# Patient Record
(demographics unavailable — no encounter records)

---

## 2017-02-07 NOTE — ACF
Admission Forms Criteria


                               MYOCARDIAL INFARCTION





Clinical Indications for Admission to Inpatient Care





                                                            (Place 'X' for any 

and all applicable criteria):





Admission is indicated for ANY ONE of the following (1)(2)(3)(4):





[X]I.    Acute MI





[ ]II.  Contraindications and/or Inappropriate clinical situations for 

Observational Care in patients


        with Myocardial Infarction, when ANY ONE of the following is required:


        [ ]a)  Patient with High risk of cardiac embolism (e.g, patients with 

previous cardiac embolism, LVEF < 40%, 


                age >75 and patients with prosthetic valve) 18


        [ ]b)  Patient with Moderate risk including DM patient, CAD and patient 

aged 65-75 18


        [ ]c)  Patient with any change in cardiac biomarker especially troponin 

should be managed as high risk in an inpatient setting 19


        [ ]d)  Physician judgement irrespective of ECG and other diagnostic 

findings 20


[ ]III.General contraindications and/or Inappropriate clinical situations for 

Observational Care in patients


        with Myocardial Infarction, when ANY ONE of the following is required:


        [ ]a)   Prediction of prolongation of LOS based on ANY ONE of the 

following may be considered as 


                 a contraindication for observational care 2, 3, 4, 5, 6, 7, 8, 

9, 10, 11


                 [ ]i)    Age > 65 yrs.


                 [ ]ii)   Patient arriving by ambulance


                 [ ]iii)  Patient with high acuity


                 [ ]iv)  Patient requiring vital sign monitoring


                 [ ]v)   Patient on IV medication


       [ ]b)   Systolic blood pressures  180mmHg 3,12


       [ ]c)   Patient with altered mental status including delirium and other 

alteration of consciousness, (3)


       [ ]d)   Patient whose discharge disposition will be to a skilled nursing 

home or rehabilitation home should 


                not be managed in Emergency Department Observation Unit. CMS 

rule requires 3 days hospital stay before such placement. 3,13


       [ ]e)   Patient with failure to thrive due to broad array of etiologies 3

,16,17


       [ ]f)    Inability to ambulate 3,14








 Extended stay beyond goal length of stay may be needed for (1)(18)(20)(24)(25):


 [ ]a)   Hemodynamic instability, persisting symptoms after intensive medical 

management, or 


          recurring severe, prolonged symptoms


 [ ]b)   Intravascular procedural complications such as acute vessel closure, 

stent thrombosis, 


          stent malposition, or vessel dissection (26)(27)(28)


 [ ]c)   Extravascular procedural complications such as retroperitoneal hematoma

, pericardial 


          effusion, or cardiac tamponade


 [ ]d)   Entry site complications causing bleeding, hematoma or distal ischemia 

and requiring 


          ongoing monitoring, surgical repair or surgical thrombectomy(29)


 [ ]e)   Dangerous arrhythmia


 [ ]f)    Complicated percutaneous coronary intervention (e.g., unsuccessful 

percutaneous 


          coronary intervention or percutaneous coronary intervention of non-

native vessel)


 [ ]g)   Urgent or emergent surgery for complications of MI (e.g., ventricular 

rupture, 


          valvular insufficiency)


 [ ]h)   Surgical revascularization via coronary artery bypass graft 


 [ ]i)    Heart failure (e.g., pulmonary edema)


 [ ]j)    Unstable pulmonary comorbidities, including COPD or pneumonia (31)


 [ ]k)   Acute renal failure








The original [employer] content created by [employer] has been revised.


The portions of the content which have been revised are identified through the 

use of italic text or in bold, and [employer] 


has neither reviewed nor approved the modified material. All other unmodified 

content is copyright  [facility] [employer]





Please see references footnoted in the original [facility] edition 

2016











THEE DOBBS Feb 7, 2017 16:14

## 2017-02-07 NOTE — EKG
[facility]

               [address] [place] [address]

Test Date:    2017               Test Time:    11:41:20

Pat Name:     GANGA MONTERO         Department:   

Patient ID:   [id number]           Room:          

Gender:       F                        Technician:   

:          [id number]               Requested By: DAMIEN ROGERS

Order Number: [id number]            Reading MD:   Sarah Benoit

                                 Measurements

Intervals                              Axis          

Rate:         105                      P:            31

TX:           150                      QRS:          0

QRSD:         88                       T:            158

QT:           332                                    

QTc:          443                                    

                           Interpretive Statements

SINUS TACHYCARDIA

LEFTWARD AXIS

LVH WITH REPOLARIZATION ABNORMALITY

ST T WAVE CHANGES CONSISTENT WITH LATERAL WALL ISCHEMIA





Electronically Signed On 2017 14:51:48 CST by Sarah Benoit

## 2017-02-07 NOTE — PDOC2
WENCESLAO FRASER APRN 2/7/17 1303:


CARDIAC CONSULT


DATE OF CONSULT


Date of Consult


DATE: 2/7/17 


TIME: 12:53





REASON FOR CONSULT


Reason for Consult:


NSTEMI





REFERRING PHYSICIAN


Referring Physician:


Dr. Majano





SOURCE


Source:  Caregiver, Chart review, Patient





HISTORY OF PRESENT ILLNESS


HISTORY OF PRESENT ILLNESS


This is a 58 yo female, with a h/o CAD s/p PCI/stenting of RCA, HTN, HLP, and 

CKD, who presented with complaints of intermittent chest pain over the last two 

weeks. Worse last night. Located in left chest. Stabbing in nature. Associated 

with back pain and SOA. One episode of nausea/vomiting last week. Activity, 

along with eating and drinking seems to worsen pain. Relieved by rest. Denies 

LE edema or orthopnea. Positives for DWYER. Denies any recent illness/fevers. 

Reports activity has been very limited over the last couple of weeks due to 

pain and SOA. Patient reports financial constraints; unable to afford 

medications/follow-up. Apparently ran out of medication on month after recent 

hospitalization in October and has not taken any medications since. Presently 

CP free. C/o back pain and mild SOA.





PAST MEDICAL HISTORY


Past Medical History


Cardiovascular:  HTN, CAD s/p PCI/stenting to RCA, Hyperlipidemia


Pulmonary:  No pertinent hx, Other (pleural effusion with thoracentesis)


CENTRAL NERVOUS SYSTEM:  Other (No pertinent history)


GI:  GERD


Heme/Onc:  No pertinent hx


Hepatobiliary:  No pertinent hx


Psych:  No pertinent hx


Musculoskeletal:  Osteoarthritis


Rheumatologic:  No pertinent hx


Infectious disease:  No pertinent hx


ENT:  No pertinent hx


Renal/:  CKD


Endocrine:  Diabetes (2)


Dermatology:  Psoriasis





PAST SURGICAL HISTORY


Past Surgical History


ureteral stent placement, PCI/stent to RCA 2/2016





FAMILY HISTORY


Family History:  Heart Disease





SOCIAL HISTORY


Smoke:  No


ALCOHOL:  none


Lives:  with Family





ALLERGIES


ALLERGIES:  


Coded Allergies:  


     No Known Drug Allergies (Unverified , 2/28/16)





ROS


Review of System


14 point ROS conducted with pertinent positives noted above in HPI.





PHYSICAL EXAM


General:  Alert, Oriented X3, Cooperative, moderate distress (now on NRB)


HEENT:  Atraumatic, Mucous membr. moist/pink


Lungs:  Other (diminished bases )


Heart:  Normal S1, Normal S2, Other (tele: ST, 2/6 systolci murmur )


Abdomen:  Soft, No tenderness


Extremities:  No cyanosis, No edema, Normal pulses


Skin:  No breakdown, No significant lesion, Other (psoriasis )


Neuro:  Normal speech, Sensation intact


Psych/Mental Status:  Mental status NL, Mood NL


MUSCULOSKELETAL:  Full range of motion without pain





VITALS


VITALS





 Vital Signs








  Date Time  Temp Pulse Resp B/P Pulse Ox O2 Delivery O2 Flow Rate FiO2


 


2/7/17 11:39 97.8 109 20 166/79 100 Room Air  





 97.8       











LABS


Lab:





Laboratory Tests








Test


  2/7/17


12:02


 


White Blood Count


  8.3x10^3/uL


(4.0-11.0)


 


Red Blood Count


  2.69x10^6/uL


(3.50-5.40)


 


Hemoglobin


  8.1g/dL


(12.0-15.5)


 


Hematocrit


  25.1%


(36.0-47.0)


 


Mean Corpuscular Volume 94fL () 


 


Mean Corpuscular Hemoglobin 30pg (25-35) 


 


Mean Corpuscular Hemoglobin


Concent 32g/dL (31-37) 


 


 


Red Cell Distribution Width


  14.9%


(11.5-14.5)


 


Platelet Count


  211x10^3/uL


(140-400)


 


Neutrophils (%) (Auto) 73% (31-73) 


 


Lymphocytes (%) (Auto) 17% (24-48) 


 


Monocytes (%) (Auto) 7% (0-9) 


 


Eosinophils (%) (Auto) 3% (0-3) 


 


Basophils (%) (Auto) 1% (0-3) 


 


Neutrophils # (Auto)


  6.0x10^3uL


(1.8-7.7)


 


Lymphocytes # (Auto)


  1.4x10^3/uL


(1.0-4.8)


 


Monocytes # (Auto)


  0.6x10^3/uL


(0.0-1.1)


 


Eosinophils # (Auto)


  0.2x10^3/uL


(0.0-0.7)


 


Basophils # (Auto)


  0.1x10^3/uL


(0.0-0.2)


 


Sodium Level


  141mmol/L


(136-145)


 


Potassium Level


  4.9mmol/L


(3.5-5.1)


 


Chloride Level


  111mmol/L


()


 


Carbon Dioxide Level


  19mmol/L


(21-32)


 


Anion Gap 11 (6-14) 


 


Blood Urea Nitrogen 49mg/dL (7-20) 


 


Creatinine


  3.9mg/dL


(0.6-1.0)


 


Estimated GFR


(Cockcroft-Gault) 11.8 


 


 


BUN/Creatinine Ratio 13 (6-20) 


 


Glucose Level


  185mg/dL


(70-99)


 


Calcium Level


  8.0mg/dL


(8.5-10.1)


 


Total Bilirubin


  0.2mg/dL


(0.2-1.0)


 


Aspartate Amino Transf


(AST/SGOT) 17U/L (15-37) 


 


 


Alanine Aminotransferase


(ALT/SGPT) 10U/L (14-59) 


 


 


Alkaline Phosphatase 90U/L () 


 


Troponin I Quantitative


  2.073ng/mL


(0.000-0.055)


 


NT-Pro-B-Type Natriuretic


Peptide 31857bn/mL


(0-124)


 


Total Protein


  6.2g/dL


(6.4-8.2)


 


Albumin


  2.6g/dL


(3.4-5.0)


 


Albumin/Globulin Ratio 0.7 (1.0-1.7) 











ECHOCARDIOGRAM


ECHOCARDIOGRAM


<Conclusion>


The left ventricle is normal size.


The left ventricular systolic function is low normal.


The Ejection Fraction is estimated at 50%.


There is mild hypokinesis in the mid to distal septal wall.


There is mild concentric left ventricular hypertrophy.


There is no significant aortic valvular stenosis.


Doppler and Color Flow revealed no significant aortic regurgitation.


Doppler and Color-flow revealed mild to moderate mitral regurgitation.


Doppler and Color Flow revealed trace tricuspid regurgitation.





DATE:     10/30/16 1411





STRESS TEST


STRESS TEST


1. Abnormal baseline EKG but no EKG evidence of stressed induced ischemia.


2. Nuclear imaging shows partially reversible ischemia in the inferior lateral 

region.


3. Decreased ejection fraction at 36% with inferior apical hypokinesis.


4. Moderate risk nuclear stress test.





DATE:     10/30/16 1354





HEART CATH


HEART CATH


Conclusion


1.  80% stenosis involving the proximal to midsegment of the right coronary 

artery.  Also seen was diffuse disease involving the distal segments of the 

left anterior descending and left circumflex artery that are not amenable for 

percutaneous intervention.


2.  Successful PCI/stents placement to the right coronary artery.





Recommendations


1.  Aspirin 325 mg daily


2.  Plavix 75 mg daily for preferably one year


3.  Cardiovascular risk factor modification








DATE:     03/04/16 5186





ASSESSMENT/PLAN


ASSESSMENT/PLAN


1.  NSTEMI 


2.  CAD   


3.  Acute on chronic combined systolic and diastolic HF


4.  Ischemic cardiomyopathy


5.  Malignant HTN


6.  HLP


7.  WALTER with CKD


8.  Noncompliance secondary to financial constraints. 














Recommendations





stat echo


initial trop 2.073- continue with series


on heparin gtt. ASA in ED. Lipids in am


start nitro gtt for vasodilation/BP control


will give IV lasix now


cardiac cath reviewed; suspect narrow LAD to be culprit 


If no significant improvement of symptoms with diuresis and BP control, would 

recommend cardiac cath. Discussed with primary cardiologist. 


Benefits, risks (including possibility of long-term HD), and alternatives 

discussed with patient and daughter and are agreeable. Will monitor closely


Problems:  





MANDO OLSON MD 2/7/17 1508:


CARDIAC CONSULT


ALLERGIES


ALLERGIES:  


Coded Allergies:  


     No Known Drug Allergies (Unverified , 2/28/16)





ASSESSMENT/PLAN


ASSESSMENT/PLAN


Patient seen and examined.  Agree with above nurse practitioner note.


59-year-old woman presenting with acute on chronic systolic heart failure.


On examination she is tachypneic with coarse rales throughout her lung fields.


Laboratory studies reviewed and notable for elevated troponin.


Prior angiogram reviewed and suspect that she has had ischemia in the setting 

of significant hypertension and severe mitral regurgitation and diffuse 

coronary disease.


We will initiate medical therapy with anticoagulation including heparinization 

but hold off on antiplatelet therapy in the event she has surgical disease.  

Continue offloading the left ventricle with nitroglycerin drip and decreasing 

blood pressure.  Initiate diuresis with Lasix.  Discussed with Dr. Gillette 

Continue supportive care.  If she does not have any significant improvement by 

tomorrow then we will consider cardiac catheterization.


Problems:  








WENCESLAO FRASER Feb 7, 2017 13:03


MANDO OLSON MD Feb 7, 2017 16:58

## 2017-02-07 NOTE — RAD
Portable chest, 2/7/2017:



History: Chest pain



Comparison is made to a study from 10/29/2016. The patient is rotated to the

right. The heart is at the upper limits of normal in size. The pulmonary

vascularity is more prominent than on the previous study with mild upper lobe

pulmonary venous redistribution. No pulmonary consolidation is seen. There is

no evidence of pleural fluid.



IMPRESSION: Borderline vascular congestion.

## 2017-02-07 NOTE — PHYS DOC
Past Medical History


Past Medical History:  CAD, CHF, Diabetes-Type II, Hypertension, Other


Additional Past Medical Histor:  PSORASIS


Past Surgical History:  Angioplasty, Coronary Bypass Surgery, Other


Additional Past Surgical Histo:  2L fluid removed from lungs, coronary 

angiography


Alcohol Use:  None


Drug Use:  None





Adult General


Chief Complaint


Chief Complaint:  CHEST WALL PAIN





HPI


HPI


59-year-old female with multiple medical problems including history of heart 

disease and renal failure presents with a 18-24 hour history of chest 

discomfort. She states it was problematic last night and again this morning but 

on arrival to the emergency department she is pain-free. She also complains of 

some shortness of breath and nausea but no diaphoresis. She has not been active 

and is unsure whether or not symptoms worsened with exertion. She states this 

pain feels exactly like the pain she had previously with a heart attack. 

Patient is [place] only speaking in the history was taken through an 

. She does report that she was supposed to follow with a 

cardiologist and a nephrologist but due to insurance concerns she followed with 

neither of them. []





Review of Systems


Review of Systems





Constitutional: Denies fever or chills []


Eyes: Denies change in visual acuity, redness, or eye pain []


HENT: Denies nasal congestion or sore throat []


Respiratory: Denies cough or shortness of breath []


Cardiovascular: No additional information not addressed in HPI []


GI: Denies abdominal pain, nausea, vomiting, bloody stools or diarrhea []


: Denies dysuria or hematuria []


Musculoskeletal: Denies back pain or joint pain []


Integument: Denies rash or skin lesions []


Neurologic: Denies headache, focal weakness or sensory changes []


Endocrine: Denies polyuria or polydipsia []





Current Medications


Current Medications





 Current Medications








 Medications


  (Trade)  Dose


 Ordered  Sig/Gume  Start Time


 Stop Time Status Last Admin


Dose Admin


 


 Aspirin 324 mg  324 mg  1X  ONCE  2/7/17 12:45


 2/7/17 12:48 DC 2/7/17 12:59


324 MG


 


 Heparin Sodium


  (Porcine)  1,950 unit  PRN Q6HRS  PRN  2/7/17 12:45


    2/7/17 13:01


1,950 UNIT


 


 Heparin Sodium/


 Dextrose  500 ml @ 


 19 mls/hr  CONT  PRN  2/7/17 12:45


    2/8/17 14:45


19 MLS/HR


 


 Morphine Sulfate  4 mg  PRN Q2HR  PRN  2/7/17 12:45


 2/8/17 12:44 DC 2/7/17 14:03


4 MG


 


 Nitroglycerin


  (Nitrostat)  0.4 mg  PRN Q5MIN  PRN  2/7/17 12:45


 2/8/17 12:44 DC 2/7/17 14:03


0.4 MG


 


 Ondansetron HCl


  (Zofran)  4 mg  PRN Q8HRS  PRN  2/7/17 12:45


 2/8/17 12:44 DC  


 











Allergies


Allergies





 Allergies








Coded Allergies Type Severity Reaction Last Updated Verified


 


  No Known Drug Allergies    2/28/16 No











Physical Exam


Physical Exam





Constitutional: Well developed, well nourished, no acute distress, non-toxic 

appearance. []


HENT: Normocephalic, atraumatic, bilateral external ears normal, oropharynx 

moist, no oral exudates, nose normal. []


Eyes: PERRLA, EOMI, conjunctiva normal, no discharge. [] 


Neck: Normal range of motion, no tenderness, supple, no stridor. [] 


Cardiovascular:Heart rate regular rhythm, no murmur []


Lungs & Thorax:  Bilateral breath sounds clear to auscultation []


Abdomen: Bowel sounds normal, soft, no tenderness, no masses, no pulsatile 

masses. [] 


Skin: Warm, dry, no erythema, no rash. [] 


Back: No tenderness, no CVA tenderness. [] 


Extremities: No tenderness, no cyanosis, no clubbing, ROM intact, no edema. [] 


Neurologic: Alert and oriented X 3, normal motor function, normal sensory 

function, no focal deficits noted. []


Psychologic: Affect normal, judgement normal, mood normal. []





Current Patient Data


Vital Signs





 Vital Signs








  Date Time  Temp Pulse Resp B/P Pulse Ox O2 Delivery O2 Flow Rate FiO2


 


2/7/17 12:45  102  193/91 100 Room Air  


 


2/7/17 11:39 97.8  20     





 97.8       








Lab Values





 Laboratory Tests








Test


  2/7/17


12:02


 


White Blood Count


  8.3x10^3/uL


(4.0-11.0)


 


Red Blood Count


  2.69x10^6/uL


(3.50-5.40)  L


 


Hemoglobin


  8.1g/dL


(12.0-15.5)  L


 


Hematocrit


  25.1%


(36.0-47.0)  L


 


Mean Corpuscular Volume 94fL ()  


 


Mean Corpuscular Hemoglobin 30pg (25-35)  


 


Mean Corpuscular Hemoglobin


Concent 32g/dL (31-37)


 


 


Red Cell Distribution Width


  14.9%


(11.5-14.5)  H


 


Platelet Count


  211x10^3/uL


(140-400)


 


Neutrophils (%) (Auto) 73% (31-73)  


 


Lymphocytes (%) (Auto) 17% (24-48)  L


 


Monocytes (%) (Auto) 7% (0-9)  


 


Eosinophils (%) (Auto) 3% (0-3)  


 


Basophils (%) (Auto) 1% (0-3)  


 


Neutrophils # (Auto)


  6.0x10^3uL


(1.8-7.7)


 


Lymphocytes # (Auto)


  1.4x10^3/uL


(1.0-4.8)


 


Monocytes # (Auto)


  0.6x10^3/uL


(0.0-1.1)


 


Eosinophils # (Auto)


  0.2x10^3/uL


(0.0-0.7)


 


Basophils # (Auto)


  0.1x10^3/uL


(0.0-0.2)


 


Sodium Level


  141mmol/L


(136-145)


 


Potassium Level


  4.9mmol/L


(3.5-5.1)


 


Chloride Level


  111mmol/L


()  H


 


Carbon Dioxide Level


  19mmol/L


(21-32)  L


 


Anion Gap 11 (6-14)  


 


Blood Urea Nitrogen


  49mg/dL (7-20)


H


 


Creatinine


  3.9mg/dL


(0.6-1.0)  H


 


Estimated GFR


(Cockcroft-Gault) 11.8  


 


 


BUN/Creatinine Ratio 13 (6-20)  


 


Glucose Level


  185mg/dL


(70-99)  H


 


Calcium Level


  8.0mg/dL


(8.5-10.1)  L


 


Total Bilirubin


  0.2mg/dL


(0.2-1.0)


 


Aspartate Amino Transferase


(AST) 17U/L (15-37)  


 


 


Alanine Aminotransferase (ALT)


  10U/L (14-59)


L


 


Alkaline Phosphatase


  90U/L ()


 


 


Troponin I Quantitative


  2.073ng/mL


(0.000-0.055)


 


NT-Pro-B-Type Natriuretic


Peptide 01294zr/mL


(0-124)  H


 


Total Protein


  6.2g/dL


(6.4-8.2)  L


 


Albumin


  2.6g/dL


(3.4-5.0)  L


 


Albumin/Globulin Ratio


  0.7 (1.0-1.7)


L





 Laboratory Tests


2/7/17 12:02








 Laboratory Tests


2/7/17 12:02














EKG


EKG


[EKG: Sinus tachycardia rate of 105 with significant ST depression V3 through 

V6 which is much more pronounced than the EKG on 10/31/2016.]





Radiology/Procedures


Radiology/Procedures


[]


Impressions:


PROCEDURE: CHEST AP ONLY











Portable chest, 2/7/2017:





History: Chest pain





Comparison is made to a study from 10/29/2016. The patient is rotated to the


right. The heart is at the upper limits of normal in size. The pulmonary


vascularity is more prominent than on the previous study with mild upper lobe


pulmonary venous redistribution. No pulmonary consolidation is seen. There is


no evidence of pleural fluid.





IMPRESSION: Borderline vascular congestion.





Course & Med Decision Making


Course & Med Decision Making


Pertinent Labs and Imaging studies reviewed. (See chart for details)





CRITICAL CARE time was 30 minutes - time exclusive of any procedures performed.

  Care included medical management, x-ray/lab interpretation, discussions with 

the patient and their family as well as appropriate medical consultants.





ED course: Evaluation reveals a 59-year-old female with abnormal EKG and 

elevated troponin. She was started on aspirin and heparin drip during her stay 

in the emergency department. I spoke with cardiology, nephrology and internal 

medicine. Dr. David will admit the patient to the intensive care unit with 

close consultation with cardiology and nephrology. Patient is chest pain-free 

during her stay in the emergency department.





Dragon Disclaimer


Dragon Disclaimer


This electronic medical record was generated, in whole or in part, using a 

voice recognition dictation system.





Departure


Departure


Impression:  


 Primary Impression:  


 NSTEMI (non-ST elevated myocardial infarction)


 Additional Impression:  


 Congestive heart failure


Disposition:  09 ADMITTED AS INPATIENT


Admitting Physician:  Chuck David


Condition:  GUARDED


Referrals:  


NO PCP (PCP)





Problem Qualifiers








 Additional Impression:  


 Congestive heart failure


 Congestive heart failure type:  systolic  Congestive heart failure chronicity:

  acute on chronic  Qualified Code:  I50.23 - Acute on chronic systolic (

congestive) heart failure





DAMIEN ROGERS DO Feb 7, 2017 12:55

## 2017-02-07 NOTE — HP
ADMIT DATE:  02/07/2017



CHIEF COMPLAINT:  Chest pain.



HISTORY OF PRESENT ILLNESS:  The patient is a 59-year-old female patient with

prior history of coronary artery disease and status post PCI and stenting in

10/2016, presented to the ER with complaints of intermittent chest pain for

nearly 2 weeks.  She describes the pain as stabbing in nature.  Last night, she

had severe back pain associated with nausea and vomiting, which made her come to

the ER.  Most of the history obtained from the family member who speaks English.

 The patient does not speak English.  I did review her old records and the

family reports that the patient is not able to take her medication due to

financial status and she denies any other complaints.



PAST MEDICAL HISTORY:  Hypertension, coronary artery disease, hyperlipidemia,

GERD, diabetes mellitus, CKD, psoriasis.



PAST SURGICAL HISTORY:  Ureteral stent placement, PCI stent placement in 2016.



FAMILY HISTORY:  Coronary artery disease.



SOCIAL HISTORY:  No smoking, no alcohol, no drug abuse.  Lives with family.



ALLERGIES:  NKDA.



REVIEW OF SYSTEMS, PHYSICAL EXAMINATION:  Please see my electronic H and P.



LABORATORY DATA:  WBC 8.3, hemoglobin is 8.1, MCV is 94, platelets 211. 

Chemistry:  Sodium is 141, potassium 4.9, chloride 111, carbon dioxide 19, anion

gap is 11, BUN is 49, creatinine is 3.9, and troponin is 2.073 and a proBNP

25,902.



IMAGING STUDIES:  Chest x-ray, borderline vascular congestion.  EKG, not able to

personally review.  As per the ER report, diffuse depressions in lateral leads

V2-V4.



ASSESSMENT AND PLAN:

1.  Acute on chronic systolic congestive heart failure.

2.  Non-ST-segment elevation myocardial infarction.

3.  Coronary artery disease.

4.  Ischemic cardiomyopathy.

5.  Malignant hypertension, POA

6.  Noncompliance with medications due to financial troubles.

7.  Hyperlipidemia.

8.  Psoriasis.



PLAN:

1.  The patient has been admitted to critical care unit and she is currently

started on heparin gtt. as per protocol and also she was started on nitro gtt.

and goal is to control her blood pressure systolic less than 140.  She received

IV Lasix and we will monitor intake and output.

2.  Monitor her troponins.

3.  Echocardiogram has been ordered, severely depressed LV

ejection fraction.  Continue, Lasix Plavix and aspirin for now.

4.  Sliding scale insulin.

5.  Labs, CBC, BMP in a.m. Overall prognosis is guarded and consult social

worker for repeated admissions and financial help.

6.  Plan explained to the family member at bedside.  All questions were

answered.

 



______________________________

OSMEL VALDEZ MD



DR:  LITZY/[email or web address]  JOB#:  [id number] / [id number]

DD:  02/07/2017 14:53  DT:  02/07/2017 19:17

MARI

## 2017-02-07 NOTE — PDOC1
History and Physical


Past Medical History


Cardiovascular:  HTN, Hyperlipidemia


Pulmonary:  No pertinent hx, Other


CENTRAL NERVOUS SYSTEM:  Other


GI:  GERD


Heme/Onc:  No pertinent hx


Hepatobiliary:  No pertinent hx


Psych:  No pertinent hx


Rheumatologic:  No pertinent hx


Infectious disease:  No pertinent hx


Renal/:  No pertinent hx


Endocrine:  Diabetes





Past Surgical History


Past Surgical History:  Other





Family History


Family History:  Heart Disease





Social History


Smoke:  No


ALCOHOL:  none


Drugs:  None





Current Problem List


Problem List


 Problems


Medical Problems:


(1) Congestive heart failure


Status: Acute  





(2) NSTEMI (non-ST elevated myocardial infarction)


Status: Acute  











Current Medications


Current Medications





 Current Medications








 Medications


  (Trade)  Dose


 Ordered  Sig/Gume  Start Time


 Stop Time Status Last Admin


Dose Admin


 


 Amlodipine


 Besylate


  (Norvasc)  5 mg  DAILYWLUN  2/7/17 14:00


    2/7/17 14:32


5 MG


 


 Aspirin


  (Ecotrin)  81 mg  DAILYWBKFT  2/7/17 14:00


     


 


 


 Aspirin 324 mg  324 mg  1X  ONCE  2/7/17 12:45


 2/7/17 12:48 DC 2/7/17 12:59


324 MG


 


 Atorvastatin


 Calcium


  (Lipitor)  80 mg  QHS  2/7/17 21:00


     


 


 


 Carvedilol


  (Coreg)  25 mg  BIDWMEALS  2/7/17 17:00


     


 


 


 Clopidogrel


 Bisulfate


  (Plavix)  75 mg  DAILY  2/7/17 14:00


    2/7/17 14:28


75 MG


 


 Furosemide


  (Lasix)  40 mg  1X  ONCE  2/7/17 14:00


 2/7/17 14:23 DC 2/7/17 14:30


40 MG


 


 Furosemide 40 mg  40 mg  1X  ONCE  2/7/17 14:30


 2/7/17 14:38 DC 2/7/17 14:30


40 MG


 


 Heparin Sodium


  (Porcine)  1,950 unit  PRN Q6HRS  PRN  2/7/17 12:45


    2/7/17 13:01


1,950 UNIT


 


 Heparin Sodium/


 Dextrose  500 ml @ 


 19 mls/hr  CONT  PRN  2/7/17 12:45


    2/7/17 13:03


19 MLS/HR


 


 Isosorbide


 Mononitrate


  (Imdur)  30 mg  DAILY  2/7/17 14:00


    2/7/17 14:30


30 MG


 


 Morphine Sulfate  4 mg  PRN Q2HR  PRN  2/7/17 12:45


 2/8/17 12:44  2/7/17 14:03


4 MG


 


 Nitroglycerin


  (Nitrostat)  0.4 mg  PRN Q5MIN  PRN  2/7/17 12:45


 2/8/17 12:44  2/7/17 14:03


0.4 MG


 


 Nitroglycerin/


 Dextrose


  (Nitroglycerin


 Drip)  250 ml @ 0


 mls/hr  CONT  PRN  2/7/17 14:30


     


 


 


 Ondansetron HCl


  (Zofran)  4 mg  PRN Q8HRS  PRN  2/7/17 12:45


 2/8/17 12:44   


 











Allergies


Allergies





 Allergies








Coded Allergies Type Severity Reaction Last Updated Verified


 


  No Known Drug Allergies    2/28/16 No











ROS


Review of System


CONSTITUTIONAL:        No fever or chills


EYES:                          No recent changes


SKIN:               No rash or itching


CARDIOVASCULAR:     chest pain,


RESPIRATORY:            SOB 


GASTROINTESTINAL:    No nausea, vomiting or abdominal pain


NEUROLOGICAL:          No headaches or weakness


ENDOCRINE:               No cold or heat intolerance


GENITOURINARY:         No urgency or frequency of urination


MUSCULOSKELETAL:   No back pain or joint pain


LYMPHATICS:               No enlarged lymph nodes


PSYCHIATRIC:              No anxiety or depression





Physical Exam


Physical Exam


GEN.:    No apparent distress.  Alert and oriented.


HEENT:    Head is normocephalic, atraumatic


NECK:    Supple.  no jvd 


LUNGS:    rales. 


HEART:    RRR, S1, S2 present.  Peripheral pulses intact


ABDOMEN:    Soft, nontender.  Positive bowel sounds. distended. 


EXTREMITIES:    Without any cyanosis.    


NEUROLOGIC:     Normal speech, normal tone


PSYCHIATRIC:    Normal affect, normal mood.


SKIN:   rash on forehead, ext surface of fore arms





Vitals


Vitals





 Vital Signs








  Date Time  Temp Pulse Resp B/P Pulse Ox O2 Delivery O2 Flow Rate FiO2


 


2/7/17 14:32  130  202/95    


 


2/7/17 14:03   26  86 Nasal Cannula 2.0 


 


2/7/17 11:39 97.8       





 97.8       











Labs


Labs





Laboratory Tests








Test


  2/7/17


12:02


 


White Blood Count


  8.3x10^3/uL


(4.0-11.0)


 


Red Blood Count


  2.69x10^6/uL


(3.50-5.40)


 


Hemoglobin


  8.1g/dL


(12.0-15.5)


 


Hematocrit


  25.1%


(36.0-47.0)


 


Mean Corpuscular Volume 94fL () 


 


Mean Corpuscular Hemoglobin 30pg (25-35) 


 


Mean Corpuscular Hemoglobin


Concent 32g/dL (31-37) 


 


 


Red Cell Distribution Width


  14.9%


(11.5-14.5)


 


Platelet Count


  211x10^3/uL


(140-400)


 


Neutrophils (%) (Auto) 73% (31-73) 


 


Lymphocytes (%) (Auto) 17% (24-48) 


 


Monocytes (%) (Auto) 7% (0-9) 


 


Eosinophils (%) (Auto) 3% (0-3) 


 


Basophils (%) (Auto) 1% (0-3) 


 


Neutrophils # (Auto)


  6.0x10^3uL


(1.8-7.7)


 


Lymphocytes # (Auto)


  1.4x10^3/uL


(1.0-4.8)


 


Monocytes # (Auto)


  0.6x10^3/uL


(0.0-1.1)


 


Eosinophils # (Auto)


  0.2x10^3/uL


(0.0-0.7)


 


Basophils # (Auto)


  0.1x10^3/uL


(0.0-0.2)


 


Sodium Level


  141mmol/L


(136-145)


 


Potassium Level


  4.9mmol/L


(3.5-5.1)


 


Chloride Level


  111mmol/L


()


 


Carbon Dioxide Level


  19mmol/L


(21-32)


 


Anion Gap 11 (6-14) 


 


Blood Urea Nitrogen 49mg/dL (7-20) 


 


Creatinine


  3.9mg/dL


(0.6-1.0)


 


Estimated GFR


(Cockcroft-Gault) 11.8 


 


 


BUN/Creatinine Ratio 13 (6-20) 


 


Glucose Level


  185mg/dL


(70-99)


 


Calcium Level


  8.0mg/dL


(8.5-10.1)


 


Total Bilirubin


  0.2mg/dL


(0.2-1.0)


 


Aspartate Amino Transf


(AST/SGOT) 17U/L (15-37) 


 


 


Alanine Aminotransferase


(ALT/SGPT) 10U/L (14-59) 


 


 


Alkaline Phosphatase 90U/L () 


 


Troponin I Quantitative


  2.073ng/mL


(0.000-0.055)


 


NT-Pro-B-Type Natriuretic


Peptide 02878jv/mL


(0-124)


 


Total Protein


  6.2g/dL


(6.4-8.2)


 


Albumin


  2.6g/dL


(3.4-5.0)


 


Albumin/Globulin Ratio 0.7 (1.0-1.7) 








Laboratory Tests








Test


  2/7/17


12:02


 


White Blood Count


  8.3x10^3/uL


(4.0-11.0)


 


Red Blood Count


  2.69x10^6/uL


(3.50-5.40)


 


Hemoglobin


  8.1g/dL


(12.0-15.5)


 


Hematocrit


  25.1%


(36.0-47.0)


 


Mean Corpuscular Volume 94fL () 


 


Mean Corpuscular Hemoglobin 30pg (25-35) 


 


Mean Corpuscular Hemoglobin


Concent 32g/dL (31-37) 


 


 


Red Cell Distribution Width


  14.9%


(11.5-14.5)


 


Platelet Count


  211x10^3/uL


(140-400)


 


Neutrophils (%) (Auto) 73% (31-73) 


 


Lymphocytes (%) (Auto) 17% (24-48) 


 


Monocytes (%) (Auto) 7% (0-9) 


 


Eosinophils (%) (Auto) 3% (0-3) 


 


Basophils (%) (Auto) 1% (0-3) 


 


Neutrophils # (Auto)


  6.0x10^3uL


(1.8-7.7)


 


Lymphocytes # (Auto)


  1.4x10^3/uL


(1.0-4.8)


 


Monocytes # (Auto)


  0.6x10^3/uL


(0.0-1.1)


 


Eosinophils # (Auto)


  0.2x10^3/uL


(0.0-0.7)


 


Basophils # (Auto)


  0.1x10^3/uL


(0.0-0.2)


 


Sodium Level


  141mmol/L


(136-145)


 


Potassium Level


  4.9mmol/L


(3.5-5.1)


 


Chloride Level


  111mmol/L


()


 


Carbon Dioxide Level


  19mmol/L


(21-32)


 


Anion Gap 11 (6-14) 


 


Blood Urea Nitrogen 49mg/dL (7-20) 


 


Creatinine


  3.9mg/dL


(0.6-1.0)


 


Estimated GFR


(Cockcroft-Gault) 11.8 


 


 


BUN/Creatinine Ratio 13 (6-20) 


 


Glucose Level


  185mg/dL


(70-99)


 


Calcium Level


  8.0mg/dL


(8.5-10.1)


 


Total Bilirubin


  0.2mg/dL


(0.2-1.0)


 


Aspartate Amino Transf


(AST/SGOT) 17U/L (15-37) 


 


 


Alanine Aminotransferase


(ALT/SGPT) 10U/L (14-59) 


 


 


Alkaline Phosphatase 90U/L () 


 


Troponin I Quantitative


  2.073ng/mL


(0.000-0.055)


 


NT-Pro-B-Type Natriuretic


Peptide 49555he/mL


(0-124)


 


Total Protein


  6.2g/dL


(6.4-8.2)


 


Albumin


  2.6g/dL


(3.4-5.0)


 


Albumin/Globulin Ratio 0.7 (1.0-1.7) 











VTE Prophylaxis Ordered


VTE Prophylaxis Devices:  Yes


VTE Pharmacological Prophylaxi:  Yes








OSMEL VALDEZ MD Feb 7, 2017 14:55

## 2017-02-07 NOTE — CARD
--------------- APPROVED REPORT --------------





EXAM: Two-dimensional and M-mode echocardiogram with Doppler and color Doppler.



Other Information 

Quality : Average

Rhythm : NSR



INDICATION

Non STEMI



2D DIMENSIONS 

Left Atrium(2D)4.2 (1.6-4.0cm)IVSd1.6 (0.7-1.1cm)

LVDd5.6 (3.9-5.9cm)LVOT Diameter2.0 (1.8-2.4cm)

PWd1.6 (0.7-1.1cm)LVDs4.7 (2.5-4.0cm)

FS (%) 16.4 %SV52.9 ml

LVEF(%)33.9 (>50%)



Aortic Valve

AoV Peak Anish.180.2cm/sAoV VTI22.4cm

AO Peak GR.13.0mmHgLVOT  VTI 11.87cm

AO Mean GR.7mmHg



Mitral Valve

MV E Rmveudxs817.4cm/sMV E Peak Gr.127mmHg

MV DECEL [id number] A Tzdruvet123.2cm/s

E/A  Ratio1.4



Tricuspid Valve

TR P. [id number]/sRAP [id number]

TR Peak Gr.10btZdOLSA36xfGn



 LEFT VENTRICLE 

The left ventricle is normal size. There is mild to moderate concentric left ventricular hypertrophy.
 Left ventricle systolic function is severely impaired. The Ejection Fraction is 25%. There is severe
 global hypokinesis with predominance noted in the inferior, apical walls. Unable to assess due to ta
chycardia.



 RIGHT VENTRICLE 

The right ventricle is normal size. The right ventricular systolic function is normal.



 ATRIA 

The left atrium size is normal. The right atrium size is normal. The interatrial septum is intact wit
h no evidence for an atrial septal defect or patent foramen ovale as noted on 2-D or Doppler imaging.




 AORTIC VALVE 

The aortic valve is not well visualized. Doppler and Color Flow revealed no significant aortic regurg
itation. There is no significant aortic valvular stenosis.



 MITRAL VALVE 

Mitral annular calcification is mild. The mitral valve leaflets are calcified. There is no mitral ismael
ve stenosis. Doppler and Color Flow revealed severe mitral regurgitation.



 TRICUSPID VALVE 

The tricuspid valve is normal in structure.. Doppler and Color Flow revealed mild to moderate tricusp
id regurgitation. There is moderate pulmonary hypertension. The PA pressure was estimated at 54 mmHg.
 There is no tricuspid valve stenosis.



 PULMONIC VALVE 

The pulmonic valve is not well visualized. Doppler and Color Flow revealed no pulmonic valvular regur
gitation. There is no pulmonic valvular stenosis.



 GREAT VESSELS 

The aortic root is normal in size. The IVC is normal in size and collapses >50% with inspiration.



 PERICARDIAL EFFUSION 

There is no evidence of significant pericardial effusion.



Critical Notification

Date: 02/07/2017

Time: 14:26

Other Discipline :   NATALIE Hudson

Critical Value: Yes



<Conclusion>

Left ventricle systolic function is severely impaired. The Ejection Fraction is 25%.

There is severe global hypokinesis with predominance noted in the inferior, apical walls.

Doppler and Color Flow revealed severe mitral regurgitation.

Doppler and Color Flow revealed mild to moderate tricuspid regurgitation.  There is moderate pulmonar
y hypertension. The PA pressure was estimated at 54 mmHg.

## 2017-02-08 NOTE — RAD
Indication:Abdominal distention



Grayscale images of the abdomen were obtained. The examination is limited. The

patient is not able to fully cooperate with the study



Comparison none.



Liver:There is some increased attenuation of the ultrasound beam by the liver

compatible with fatty infiltration. A focal mass lesion is not seen in the

visualized liver

Gallbladder:There is cholelithiasis. Wall thickening or pericholecystic fluid

is not seen. The common bile duct diameter of approximately 5 mm is normal

Spleen:Not optimally visualized but grossly normal

Pancreas:The head and body of the pancreas appeared unremarkable. The tail was

obscured by gas

Kidneys:The right kidney measures 11.6 x 5.3 x 4.8 cm. There is a 1.4 cm mass,

compatible with a cyst, associated with the right kidney. The left kidney is

poorly visualized. There is a prominent renal pelvis. Findings may reflect an

extrarenal pelvis or hydronephrosis. There is a hypoechoic 1.9 cm mass

associated with the left kidney compatible with a cyst.. Both kidneys are

noted to be echogenic suggesting medical renal disease

Abdominal aorta and IVC:The visualized inferior vena cava appeared normal. The

proximal and mid abdominal aorta appeared unremarkable. The distal abdominal

aorta was obscured.

Ancillary findings:Right pleural effusion



Impression:Cholelithiasis

                    Fatty infiltration of the liver.

                    Bilateral renal cysts. Additional findings suggesting

medical renal disease. Extrarenal pelvis versus hydronephrosis on the left

                     Midline structures partially obscured by gas

## 2017-02-08 NOTE — PDOC2
CONSULT


Date of Consult


Date of Consult


DATE: 2/8/17 


TIME: 11:10





Reason for Consult


Reason for Consult:


WALTER + CKD Iv





Referring Physician


Referring Physician:


Dr green





Identification/Chief Complaint


Chief Complaint


NSTEMI, SOB


Problems:  





Source


Source:  Caregiver, Patient





History of Present Illness


Reason for Visit:


as dictated





Past Medical History


Cardiovascular:  HTN, Hyperlipidemia


Pulmonary:  No pertinent hx, Other


CENTRAL NERVOUS SYSTEM:  Other


GI:  GERD


Heme/Onc:  No pertinent hx


Hepatobiliary:  No pertinent hx


Psych:  No pertinent hx


Musculoskeletal:  Osteoarthritis


Rheumatologic:  No pertinent hx


Infectious disease:  No pertinent hx


Renal/:  No pertinent hx


Endocrine:  Diabetes





Past Surgical History


Past Surgical History:  Cystoscopy, Other





Family History


Family History:  Heart Disease





Social History


No


ALCOHOL:  none


Drugs:  None


Lives:  with Family





Current Problem List


Problem List


 Problems


Medical Problems:


(1) Congestive heart failure


Status: Acute  





(2) NSTEMI (non-ST elevated myocardial infarction)


Status: Acute  











Current Medications


Current Medications





 Current Medications


Aspirin 324 mg 324 mg 1X  ONCE PO  Last administered on 2/7/17at 12:59;  Start 2 /7/17 at 12:45;  Stop 2/7/17 at 12:48;  Status DC


Heparin Sodium/ Dextrose 500 ml @  19 mls/hr CONT  PRN IV SEE I/O RECORD Last 

administered on 2/7/17at 13:03;  Start 2/7/17 at 12:45


Heparin Sodium (Porcine) 1,950 unit PRN Q6HRS  PRN IV FOR UFH LEVEL LESS THAN 

0.2 Last administered on 2/7/17at 13:01;  Start 2/7/17 at 12:45


Ondansetron HCl (Zofran) 4 mg PRN Q8HRS  PRN IV NAUSEA/VOMITING;  Start 2/7/17 

at 12:45;  Stop 2/8/17 at 12:44


Morphine Sulfate 4 mg PRN Q2HR  PRN IV PAIN Last administered on 2/7/17at 14:03

;  Start 2/7/17 at 12:45;  Stop 2/8/17 at 12:44


Nitroglycerin (Nitrostat) 0.4 mg PRN Q5MIN  PRN SL CHEST PAIN Last administered 

on 2/7/17at 14:03;  Start 2/7/17 at 12:45;  Stop 2/8/17 at 12:44


Amlodipine Besylate (Norvasc) 5 mg DAILYWLUN PO  Last administered on 2/8/17at 

08:32;  Start 2/7/17 at 14:00


Aspirin (Ecotrin) 81 mg DAILYWBKFT PO ;  Start 2/7/17 at 14:00


Atorvastatin Calcium (Lipitor) 80 mg QHS PO  Last administered on 2/7/17at 20:59

;  Start 2/7/17 at 21:00


Carvedilol (Coreg) 25 mg BIDWMEALS PO  Last administered on 2/8/17at 08:31;  

Start 2/7/17 at 17:00


Clopidogrel Bisulfate (Plavix) 75 mg DAILY PO  Last administered on 2/7/17at 14:

28;  Start 2/7/17 at 14:00;  Stop 2/8/17 at 08:41;  Status DC


Isosorbide Mononitrate (Imdur) 30 mg DAILY PO  Last administered on 2/7/17at 14:

30;  Start 2/7/17 at 14:00


Furosemide (Lasix) 40 mg 1X  ONCE IVP  Last administered on 2/7/17at 14:30;  

Start 2/7/17 at 14:00;  Stop 2/7/17 at 14:23;  Status DC


Furosemide 40 mg 40 mg 1X  ONCE IVP  Last administered on 2/7/17at 14:30;  

Start 2/7/17 at 14:30;  Stop 2/7/17 at 14:38;  Status DC


Nitroglycerin/ Dextrose (Nitroglycerin Drip) 250 ml @ 0 mls/hr CONT  PRN IV SEE 

I/O RECORD Last administered on 2/7/17at 15:23;  Start 2/7/17 at 14:30


Acetaminophen (Tylenol) 325 mg PRN Q6HRS  PRN PO MILD PAIN / TEMP;  Start 2/7/ 17 at 15:00


Acetaminophen/ Hydrocodone Bitart (Lortab 5/325) 1 tab PRN Q6HRS  PRN PO 

MODERATE TO SEVERE PAIN;  Start 2/7/17 at 15:00


Hydralazine HCl (Apresoline) 10 mg PRN Q4HRS  PRN IVP ELEVATED BP, SEE COMMENTS

;  Start 2/7/17 at 15:00


Ondansetron HCl (Zofran) 4 mg PRN Q8HRS  PRN IV NAUSEA/VOMITING;  Start 2/7/17 

at 15:00


Albuterol Sulfate (Ventolin Neb Soln) 2.5 mg PRN Q4HRS  PRN NEB SHORTNESS OF 

BREATH;  Start 2/7/17 at 15:00


Insulin Aspart (Novolog) 0-7 UNITS TIDWMEALS SQ ;  Start 2/7/17 at 17:00


Dextrose 12.5 gm PRN Q15MIN  PRN IV SEE COMMENTS;  Start 2/7/17 at 15:00


Info (Anti-Coagulation Monitoring By Pharmacy) 1 each PRN DAILY  PRN MC SEE 

COMMENTS Last administered on 2/8/17at 08:34;  Start 2/7/17 at 17:00





Active Scripts


Active


Lisinopril 20 Mg Tablet 20 Mg PO QHS


Isosorbide Mononitrate Er (Isosorbide Mononitrate) 30 Mg Tab.er.24h 30 Mg PO 

DAILY


Hydralazine Hcl 50 Mg Tablet 100 Mg PO TID


Carvedilol 12.5 Mg Tablet 25 Mg PO BIDWMEALS


Atorvastatin Calcium 40 Mg Tablet 80 Mg PO QHS


Aspirin Ec (Aspirin) 81 Mg Tablet. 81 Mg PO DAILYWBKFT


Amlodipine Besylate 5 Mg Tablet 5 Mg PO DAILYWLUN


Reported


Clopidogrel (Clopidogrel Bisulfate) 75 Mg Tablet 75 Mg PO DAILY


Glyburide 5 Mg Tablet 1 Tab PO BIDWMEALS


Lisinopril 20 Mg Tablet 1 Tab PO DAILY





Allergies


Allergies:  


Coded Allergies:  


     No Known Drug Allergies (Unverified , 2/28/16)





ROS


Review of System


as dictated in HPI





Physical Exam


Physical Exam


GEN:    Awake, Oriented x 3, In no distress


EYES:  Vision Unchanged, Conjunctiva Normal


EN:      No EN Drainage, Mucous Membranes  moist


NECK:  no JVD, min JVP, Supple, no Thyromegaly


CVS:    S1S2, ? Murmur, No Gallop, No Rub,tr Edema


RESP:  rare  Rales, no Rhonchi,no Acc. Muscle Use


GI:        BS + ve, NO Bruit, Non Tender, Non Distended


:      no CVA tenderness, no Suprapubic Tenderness


Vital Signs





 Vital Signs








  Date Time  Temp Pulse Resp B/P Pulse Ox O2 Delivery O2 Flow Rate FiO2


 


2/8/17 08:32    153/70    


 


2/8/17 08:00 99.0 86 20  98 Room Air  





 99.0       


 


2/7/17 21:00       2.0 








Assessment & Plan


ESRD:   Dialysis as below





F 180 NR 3.0  Hrs





3 K 2.5 Ca 140 Na 40     HC03





Qb 350 + Qd 500+





Heparin  0 Units





Uf  2 Kgs or to dry weight as tolerated





May give 25-50 gms of 25% Albumin if needed to maintain Hemodynamic stability





Treatment plan reviewed and discussed with Dialysis RN 





Edson VILLAFANA to use [facility]





Met Acidosis - Correct with HD





Anemia:   start Epogen once BP are better controlled;  Transfuse  with next HD 

as needed.





HTN:   Current BP meds reviewed.  See orders for changes.





Discussed Plan of Care and prognosis etc. at length with family.





Labs


Labs





Laboratory Tests








Test


  2/7/17


12:02 2/7/17


14:05 2/7/17


18:19 2/7/17


18:40


 


White Blood Count


  8.3x10^3/uL


(4.0-11.0) 


  


  


 


 


Red Blood Count


  2.69x10^6/uL


(3.50-5.40) 


  


  


 


 


Hemoglobin


  8.1g/dL


(12.0-15.5) 


  


  


 


 


Hematocrit


  25.1%


(36.0-47.0) 


  


  


 


 


Mean Corpuscular Volume 94fL ()    


 


Mean Corpuscular Hemoglobin 30pg (25-35)    


 


Mean Corpuscular Hemoglobin


Concent 32g/dL (31-37) 


  


  


  


 


 


Red Cell Distribution Width


  14.9%


(11.5-14.5) 


  


  


 


 


Platelet Count


  211x10^3/uL


(140-400) 


  


  


 


 


Neutrophils (%) (Auto) 73% (31-73)    


 


Lymphocytes (%) (Auto) 17% (24-48)    


 


Monocytes (%) (Auto) 7% (0-9)    


 


Eosinophils (%) (Auto) 3% (0-3)    


 


Basophils (%) (Auto) 1% (0-3)    


 


Neutrophils # (Auto)


  6.0x10^3uL


(1.8-7.7) 


  


  


 


 


Lymphocytes # (Auto)


  1.4x10^3/uL


(1.0-4.8) 


  


  


 


 


Monocytes # (Auto)


  0.6x10^3/uL


(0.0-1.1) 


  


  


 


 


Eosinophils # (Auto)


  0.2x10^3/uL


(0.0-0.7) 


  


  


 


 


Basophils # (Auto)


  0.1x10^3/uL


(0.0-0.2) 


  


  


 


 


Sodium Level


  141mmol/L


(136-145) 


  


  


 


 


Potassium Level


  4.9mmol/L


(3.5-5.1) 


  


  


 


 


Chloride Level


  111mmol/L


() 


  


  


 


 


Carbon Dioxide Level


  19mmol/L


(21-32) 


  


  


 


 


Anion Gap 11 (6-14)    


 


Blood Urea Nitrogen 49mg/dL (7-20)    


 


Creatinine


  3.9mg/dL


(0.6-1.0) 


  


  


 


 


Estimated GFR


(Cockcroft-Gault) 11.8 


  


  


  


 


 


BUN/Creatinine Ratio 13 (6-20)    


 


Glucose Level


  185mg/dL


(70-99) 


  


  


 


 


Calcium Level


  8.0mg/dL


(8.5-10.1) 


  


  


 


 


Total Bilirubin


  0.2mg/dL


(0.2-1.0) 


  


  


 


 


Aspartate Amino Transf


(AST/SGOT) 17U/L (15-37) 


  


  


  


 


 


Alanine Aminotransferase


(ALT/SGPT) 10U/L (14-59) 


  


  


  


 


 


Alkaline Phosphatase 90U/L ()    


 


Troponin I Quantitative


  2.073ng/mL


(0.000-0.055) 


  


  3.922ng/mL


(0.000-0.055)


 


NT-Pro-B-Type Natriuretic


Peptide 40710bs/mL


(0-124) 


  


  


 


 


Total Protein


  6.2g/dL


(6.4-8.2) 


  


  


 


 


Albumin


  2.6g/dL


(3.4-5.0) 


  


  


 


 


Albumin/Globulin Ratio 0.7 (1.0-1.7)    


 


Nasal Screen MRSA (PCR)


  


  Negative


(Negative) 


  


 


 


Glucose (Fingerstick)


  


  


  174mg/dL


(70-99) 


 














Test


  2/8/17


00:28 2/8/17


01:10 2/8/17


07:50 2/8/17


10:38


 


Troponin I Quantitative


  4.814ng/mL


(0.000-0.055) 


  


  


 


 


Heparin Anti-Xa Act,


Unfractionated 


  0.20IU/mL


(0.30-0.70) 0.30IU/mL


(0.30-0.70) 


 


 


White Blood Count


  


  


  6.6x10^3/uL


(4.0-11.0) 


 


 


Red Blood Count


  


  


  2.39x10^6/uL


(3.50-5.40) 


 


 


Hemoglobin


  


  


  7.3g/dL


(12.0-15.5) 


 


 


Hematocrit


  


  


  22.5%


(36.0-47.0) 


 


 


Mean Corpuscular Volume   94fL ()  


 


Mean Corpuscular Hemoglobin   31pg (25-35)  


 


Mean Corpuscular Hemoglobin


Concent 


  


  33g/dL (31-37) 


  


 


 


Red Cell Distribution Width


  


  


  15.3%


(11.5-14.5) 


 


 


Platelet Count


  


  


  189x10^3/uL


(140-400) 


 


 


Neutrophils (%) (Auto)   78% (31-73)  


 


Lymphocytes (%) (Auto)   11% (24-48)  


 


Monocytes (%) (Auto)   5% (0-9)  


 


Eosinophils (%) (Auto)   5% (0-3)  


 


Basophils (%) (Auto)   1% (0-3)  


 


Neutrophils # (Auto)


  


  


  5.1x10^3uL


(1.8-7.7) 


 


 


Lymphocytes # (Auto)


  


  


  0.7x10^3/uL


(1.0-4.8) 


 


 


Monocytes # (Auto)


  


  


  0.4x10^3/uL


(0.0-1.1) 


 


 


Eosinophils # (Auto)


  


  


  0.3x10^3/uL


(0.0-0.7) 


 


 


Basophils # (Auto)


  


  


  0.1x10^3/uL


(0.0-0.2) 


 


 


Sodium Level


  


  


  141mmol/L


(136-145) 


 


 


Potassium Level


  


  


  4.5mmol/L


(3.5-5.1) 


 


 


Chloride Level


  


  


  110mmol/L


() 


 


 


Carbon Dioxide Level


  


  


  20mmol/L


(21-32) 


 


 


Anion Gap   11 (6-14)  


 


Blood Urea Nitrogen   49mg/dL (7-20)  


 


Creatinine


  


  


  4.2mg/dL


(0.6-1.0) 


 


 


Estimated GFR


(Cockcroft-Gault) 


  


  10.8 


  


 


 


Glucose Level


  


  


  100mg/dL


(70-99) 


 


 


Calcium Level


  


  


  8.3mg/dL


(8.5-10.1) 


 


 


Triglycerides Level


  


  


  307mg/dL


(0-150) 


 


 


Cholesterol Level


  


  


  351mg/dL


(0-200) 


 


 


LDL Cholesterol, Calculated


  


  


  242mg/dL


(0-100) 


 


 


VLDL Cholesterol, Calculated   61mg/dL (0-40)  


 


HDL Cholesterol


  


  


  48mg/dL


(40-60) 


 


 


Cholesterol/HDL Ratio   7.3  


 


Glucose (Fingerstick)


  


  


  


  95mg/dL


(70-99)








Laboratory Tests








Test


  2/7/17


12:02 2/7/17


14:05 2/7/17


18:19 2/7/17


18:40


 


White Blood Count


  8.3x10^3/uL


(4.0-11.0) 


  


  


 


 


Red Blood Count


  2.69x10^6/uL


(3.50-5.40) 


  


  


 


 


Hemoglobin


  8.1g/dL


(12.0-15.5) 


  


  


 


 


Hematocrit


  25.1%


(36.0-47.0) 


  


  


 


 


Mean Corpuscular Volume 94fL ()    


 


Mean Corpuscular Hemoglobin 30pg (25-35)    


 


Mean Corpuscular Hemoglobin


Concent 32g/dL (31-37) 


  


  


  


 


 


Red Cell Distribution Width


  14.9%


(11.5-14.5) 


  


  


 


 


Platelet Count


  211x10^3/uL


(140-400) 


  


  


 


 


Neutrophils (%) (Auto) 73% (31-73)    


 


Lymphocytes (%) (Auto) 17% (24-48)    


 


Monocytes (%) (Auto) 7% (0-9)    


 


Eosinophils (%) (Auto) 3% (0-3)    


 


Basophils (%) (Auto) 1% (0-3)    


 


Neutrophils # (Auto)


  6.0x10^3uL


(1.8-7.7) 


  


  


 


 


Lymphocytes # (Auto)


  1.4x10^3/uL


(1.0-4.8) 


  


  


 


 


Monocytes # (Auto)


  0.6x10^3/uL


(0.0-1.1) 


  


  


 


 


Eosinophils # (Auto)


  0.2x10^3/uL


(0.0-0.7) 


  


  


 


 


Basophils # (Auto)


  0.1x10^3/uL


(0.0-0.2) 


  


  


 


 


Sodium Level


  141mmol/L


(136-145) 


  


  


 


 


Potassium Level


  4.9mmol/L


(3.5-5.1) 


  


  


 


 


Chloride Level


  111mmol/L


() 


  


  


 


 


Carbon Dioxide Level


  19mmol/L


(21-32) 


  


  


 


 


Anion Gap 11 (6-14)    


 


Blood Urea Nitrogen 49mg/dL (7-20)    


 


Creatinine


  3.9mg/dL


(0.6-1.0) 


  


  


 


 


Estimated GFR


(Cockcroft-Gault) 11.8 


  


  


  


 


 


BUN/Creatinine Ratio 13 (6-20)    


 


Glucose Level


  185mg/dL


(70-99) 


  


  


 


 


Calcium Level


  8.0mg/dL


(8.5-10.1) 


  


  


 


 


Total Bilirubin


  0.2mg/dL


(0.2-1.0) 


  


  


 


 


Aspartate Amino Transf


(AST/SGOT) 17U/L (15-37) 


  


  


  


 


 


Alanine Aminotransferase


(ALT/SGPT) 10U/L (14-59) 


  


  


  


 


 


Alkaline Phosphatase 90U/L ()    


 


Troponin I Quantitative


  2.073ng/mL


(0.000-0.055) 


  


  3.922ng/mL


(0.000-0.055)


 


NT-Pro-B-Type Natriuretic


Peptide 40936lv/mL


(0-124) 


  


  


 


 


Total Protein


  6.2g/dL


(6.4-8.2) 


  


  


 


 


Albumin


  2.6g/dL


(3.4-5.0) 


  


  


 


 


Albumin/Globulin Ratio 0.7 (1.0-1.7)    


 


Nasal Screen MRSA (PCR)


  


  Negative


(Negative) 


  


 


 


Glucose (Fingerstick)


  


  


  174mg/dL


(70-99) 


 














Test


  2/8/17


00:28 2/8/17


01:10 2/8/17


07:50 2/8/17


10:38


 


Troponin I Quantitative


  4.814ng/mL


(0.000-0.055) 


  


  


 


 


Heparin Anti-Xa Act,


Unfractionated 


  0.20IU/mL


(0.30-0.70) 0.30IU/mL


(0.30-0.70) 


 


 


White Blood Count


  


  


  6.6x10^3/uL


(4.0-11.0) 


 


 


Red Blood Count


  


  


  2.39x10^6/uL


(3.50-5.40) 


 


 


Hemoglobin


  


  


  7.3g/dL


(12.0-15.5) 


 


 


Hematocrit


  


  


  22.5%


(36.0-47.0) 


 


 


Mean Corpuscular Volume   94fL ()  


 


Mean Corpuscular Hemoglobin   31pg (25-35)  


 


Mean Corpuscular Hemoglobin


Concent 


  


  33g/dL (31-37) 


  


 


 


Red Cell Distribution Width


  


  


  15.3%


(11.5-14.5) 


 


 


Platelet Count


  


  


  189x10^3/uL


(140-400) 


 


 


Neutrophils (%) (Auto)   78% (31-73)  


 


Lymphocytes (%) (Auto)   11% (24-48)  


 


Monocytes (%) (Auto)   5% (0-9)  


 


Eosinophils (%) (Auto)   5% (0-3)  


 


Basophils (%) (Auto)   1% (0-3)  


 


Neutrophils # (Auto)


  


  


  5.1x10^3uL


(1.8-7.7) 


 


 


Lymphocytes # (Auto)


  


  


  0.7x10^3/uL


(1.0-4.8) 


 


 


Monocytes # (Auto)


  


  


  0.4x10^3/uL


(0.0-1.1) 


 


 


Eosinophils # (Auto)


  


  


  0.3x10^3/uL


(0.0-0.7) 


 


 


Basophils # (Auto)


  


  


  0.1x10^3/uL


(0.0-0.2) 


 


 


Sodium Level


  


  


  141mmol/L


(136-145) 


 


 


Potassium Level


  


  


  4.5mmol/L


(3.5-5.1) 


 


 


Chloride Level


  


  


  110mmol/L


() 


 


 


Carbon Dioxide Level


  


  


  20mmol/L


(21-32) 


 


 


Anion Gap   11 (6-14)  


 


Blood Urea Nitrogen   49mg/dL (7-20)  


 


Creatinine


  


  


  4.2mg/dL


(0.6-1.0) 


 


 


Estimated GFR


(Cockcroft-Gault) 


  


  10.8 


  


 


 


Glucose Level


  


  


  100mg/dL


(70-99) 


 


 


Calcium Level


  


  


  8.3mg/dL


(8.5-10.1) 


 


 


Triglycerides Level


  


  


  307mg/dL


(0-150) 


 


 


Cholesterol Level


  


  


  351mg/dL


(0-200) 


 


 


LDL Cholesterol, Calculated


  


  


  242mg/dL


(0-100) 


 


 


VLDL Cholesterol, Calculated   61mg/dL (0-40)  


 


HDL Cholesterol


  


  


  48mg/dL


(40-60) 


 


 


Cholesterol/HDL Ratio   7.3  


 


Glucose (Fingerstick)


  


  


  


  95mg/dL


(70-99)











Images


Images


ABD US:





Cholelithiasis


                    Fatty infiltration of the liver.


                    Bilateral renal cysts. Additional findings suggesting


medical renal disease. Extrarenal pelvis versus hydronephrosis on the left


                     Midline structures partially obscured by gas








Portable chest, 2/7/2017:





History: Chest pain





Comparison is made to a study from 10/29/2016. The patient is rotated to the


right. The heart is at the upper limits of normal in size. The pulmonary


vascularity is more prominent than on the previous study with mild upper lobe


pulmonary venous redistribution. No pulmonary consolidation is seen. There is


no evidence of pleural fluid.





IMPRESSION: Borderline vascular congestion.








RENAN DODD MD Feb 8, 2017 11:16

## 2017-02-08 NOTE — PDOC
WENCESLAO FRASER APRN 2/8/17 1553:


CARDIO Progress Notes


Date and Time


Date of Service


2/8/17


Time of Evaluation


0945





Subjective


Subjective:  No Chest Pain, No Palpitations, No Dizziness, Other (comfortable, 

mild SOA )





Vitals


Vitals





 Vital Signs








  Date Time  Temp Pulse Resp B/P Pulse Ox O2 Delivery O2 Flow Rate FiO2


 


2/8/17 15:00  85 20 148/61 98 Room Air  


 


2/8/17 09:00       2.0 


 


2/8/17 08:00 99.0       





 99.0       








Weight


Weight [ ]





Input and Output


Intake and Output











 Intake and Output 


 


 2/8/17





 07:00


 


Intake Total 337.02 ml


 


Output Total 1875 ml


 


Balance -1537.98 ml


 


 


 


Intake IV Total 337.02 ml


 


Output Urine Total 1875 ml











Laboratory


Labs





Laboratory Tests








Test


  2/7/17


18:19 2/7/17


18:40 2/8/17


00:28 2/8/17


01:10


 


Glucose (Fingerstick)


  174mg/dL


(70-99) 


  


  


 


 


Troponin I Quantitative


  


  3.922ng/mL


(0.000-0.055) 4.814ng/mL


(0.000-0.055) 


 


 


Heparin Anti-Xa Act,


Unfractionated 


  


  


  0.20IU/mL


(0.30-0.70)














Test


  2/8/17


07:50 2/8/17


10:38 2/8/17


11:40 2/8/17


14:12


 


White Blood Count


  6.6x10^3/uL


(4.0-11.0) 


  


  


 


 


Red Blood Count


  2.39x10^6/uL


(3.50-5.40) 


  


  


 


 


Hemoglobin


  7.3g/dL


(12.0-15.5) 


  


  


 


 


Hematocrit


  22.5%


(36.0-47.0) 


  


  


 


 


Mean Corpuscular Volume 94fL ()    


 


Mean Corpuscular Hemoglobin 31pg (25-35)    


 


Mean Corpuscular Hemoglobin


Concent 33g/dL (31-37) 


  


  


  


 


 


Red Cell Distribution Width


  15.3%


(11.5-14.5) 


  


  


 


 


Platelet Count


  189x10^3/uL


(140-400) 


  


  


 


 


Neutrophils (%) (Auto) 78% (31-73)    


 


Lymphocytes (%) (Auto) 11% (24-48)    


 


Monocytes (%) (Auto) 5% (0-9)    


 


Eosinophils (%) (Auto) 5% (0-3)    


 


Basophils (%) (Auto) 1% (0-3)    


 


Neutrophils # (Auto)


  5.1x10^3uL


(1.8-7.7) 


  


  


 


 


Lymphocytes # (Auto)


  0.7x10^3/uL


(1.0-4.8) 


  


  


 


 


Monocytes # (Auto)


  0.4x10^3/uL


(0.0-1.1) 


  


  


 


 


Eosinophils # (Auto)


  0.3x10^3/uL


(0.0-0.7) 


  


  


 


 


Basophils # (Auto)


  0.1x10^3/uL


(0.0-0.2) 


  


  


 


 


Heparin Anti-Xa Act,


Unfractionated 0.30IU/mL


(0.30-0.70) 


  


  


 


 


Sodium Level


  141mmol/L


(136-145) 


  


  


 


 


Potassium Level


  4.5mmol/L


(3.5-5.1) 


  


  


 


 


Chloride Level


  110mmol/L


() 


  


  


 


 


Carbon Dioxide Level


  20mmol/L


(21-32) 


  


  


 


 


Anion Gap 11 (6-14)    


 


Blood Urea Nitrogen 49mg/dL (7-20)    


 


Creatinine


  4.2mg/dL


(0.6-1.0) 


  


  


 


 


Estimated GFR


(Cockcroft-Gault) 10.8 


  


  


  


 


 


Glucose Level


  100mg/dL


(70-99) 


  


  


 


 


Calcium Level


  8.3mg/dL


(8.5-10.1) 


  


  


 


 


Triglycerides Level


  307mg/dL


(0-150) 


  


  


 


 


Cholesterol Level


  351mg/dL


(0-200) 


  


  


 


 


LDL Cholesterol, Calculated


  242mg/dL


(0-100) 


  


  


 


 


VLDL Cholesterol, Calculated 61mg/dL (0-40)    


 


HDL Cholesterol


  48mg/dL


(40-60) 


  


  


 


 


Cholesterol/HDL Ratio 7.3    


 


Glucose (Fingerstick)


  


  95mg/dL


(70-99) 


  103mg/dL


(70-99)


 


Prothrombin Time


  


  


  13.5SEC


(11.7-14.0) 


 


 


Prothromb Time International


Ratio 


  


  1.1 (0.8-1.1) 


  


 


 


Activated Partial


Thromboplast Time 


  


  95SEC (24-38) 


  


 














Test


  2/8/17


15:05 


  


  


 


 


Heparin Anti-Xa Act,


Unfractionated 0.38IU/mL


(0.30-0.70) 


  


  


 











Physical Exam


HEENT:  Neck Supple W Full Motion


Chest:  Symmetric


LUNGS:  Other (bibasilar crackles )


Heart:  S1S2, RRR, other (2/6 systolic murmur )


Abdomen:  Soft N/T


Extremities:  2+ Dorsalis Pedis, No Edema


Neurology:  alert, oriented, follow commands





Assessment


Assessment


1.  Acute on chronic combined systolic and diastolic HF


2.  NSTEMI 


3.  CAD   


4.  Ischemic cardiomyopathy; LVEF 25%


5.  Malignant HTN


6.  HLP


7.  WALTER with CKD, HD initiated 


8.  Noncompliance secondary to financial constraints. 














Recommendations





Maintain BP control


Add ACE for HF optimization- d/w renal


Fluid offloading in HD per nephrology


Titrate off nitro. Add hydralazine, continue Imdur. 


Supportive care


NPO p MN. Will monitor overnight and plan for LHC in am





MANDO OLSON MD 2/8/17 1806:


CARDIO Progress Notes


Plan


Plan


Patient seen and examined.  Agree with above nurse practitioner note.


No acute events overnight.  Doing well this morning.


Plan for dialysis later today.


On exam she appears stable with normal breathing rates.


We will plan for a heart catheterization tomorrow in light of her continually 

elevated troponin.








WENCESLAO FRASER Feb 8, 2017 15:53


MANDO OLSON MD Feb 8, 2017 18:06

## 2017-02-08 NOTE — PDOC
BRIEF OPERATIVE NOTE


Pre-Op Diagnosis


ARF


Post-Op Diagnosis


same


Procedure Performed


Temp HD Catheter


Surgeon


Mikhail


Anesthesia Type:  Local


Findings


20cm R IJ Schoen with excellent manual flows


Complications


No immediate








SHANNON ACOSTA MD Feb 8, 2017 14:11

## 2017-02-08 NOTE — RAD
Procedure: Temporary hemodialysis catheter placement at the bedside.



Clinical Indication: 59-year-old with acute renal failure requiring

hemodialysis



Sedation: Local anesthesia only



Antibiotics: None



Fluoro Time: Not applicable



Contrast: None



Sterility: All elements of maximal sterile barrier technique including the use

of a cap, mask, sterile gown, sterile gloves, large sterile sheet, appropriate

hand hygiene, and 2% chlorhexidine for cutaneous antisepsis (or acceptable

alternative antiseptic per current guidelines) were followed for this

procedure.



Consent: The procedure was explained in its entirety to the patient or the

patients designated representative by a member of the treatment team,

including a discussion of the risks, benefits and commonly accepted

alternatives to the procedure, as well as the expected consequences of no

therapy whatsoever.   Discussion of the risks included, but was not limited

to, those that are most frequent and those that are rare but possibly severe

or life-threatening, as well as the possibility of unforeseen complications.



Technique and Findings: Following informed consent, the patient was prepped

and draped in the usual sterile fashion.  Ultrasound interrogation of the

right neck revealed patency and compressibility of the right internal jugular

vein.  A 21-gauge micropuncture needle was used to gain access to this vein

after 1% Lidocaine was used to achieve local anesthesia.  A hardcopy

ultrasound image was recorded.  The needle was exchanged over a wire for

serial dilators followed by a 20 cm Schon temporary hemodialysis catheter

which was deployed in the expected location of the mid right atrium.  The

catheter flow rates were assessed manually and found to be excellent.  The

catheter was then flushed, packed with Heparin, capped, and sutured to the

skin.  Chest x-ray was then obtained to assess line position.



Complications: No immediate



Impression:

1. Ultrasound guided placement of a temporary hemodialysis catheter which

exhibits excellent manual flow rates as described.

## 2017-02-08 NOTE — RAD
Indication assess line placement.



An AP view of the chest was obtained and is compared to an exam one day

earlier.



Changes of congestive heart failure seen previously appears improved. There is

likely some mild pulmonary vascular congestion which persists. Cardiomegaly is

unchanged. A focal infiltrate is not seen. There has been interval placement

of a right-sided dialysis catheter. The tip is at the SVC right atrial

junction. No pneumothorax is seen.



IMPRESSION: Appropriately positioned right-sided dialysis catheter. No

complication seen.

                          Slight interval improvement in the appearance of the

chest

## 2017-02-08 NOTE — PDOC
PROGRESS NOTES


Chief Complaint


Chief Complaint


cc: sob 





A//P


1.  Acute on chronic systolic congestive heart failure.


2.  Non-ST-segment elevation myocardial infarction.


3.  WALTER


4.  Ischemic cardiomyopathy.


5.  Malignant hypertension, Better


6.  Noncompliance with medications due to financial troubles.


7.  Hyperlipidemia.


8.  Psoriasis.


9. CAD


10. Anemia





Plan 


Temp HD catheter today 


volume off lading today 


continue heparin gtt per ACS protocol 


BP controlled, cardiology following 


intake and out put. 


Possible cardiac cath once HD catheter access established 


SSI 


Lipitor 


labs reviewed 


Monitor hemoglobin 


Prognosis guarded. 


family at bedside, all questions answered.





History of Present Illness


History of Present Illness


sob better


no fever


no chills. 


no chest pain





Vitals


Vitals





 Vital Signs








  Date Time  Temp Pulse Resp B/P Pulse Ox O2 Delivery O2 Flow Rate FiO2


 


2/8/17 08:32    153/70    


 


2/8/17 08:00 99.0 86 20  98 Room Air  





 99.0       


 


2/7/17 21:00       2.0 











Physical Exam


General:  Alert, Oriented X3, Cooperative, moderate distress (now on NRB)


Heart:  Normal S1, Normal S2, Other (tele: ST, 2/6 systolci murmur )


Lungs:  Clear, Other


Abdomen:  Soft, No tenderness


Extremities:  No cyanosis, No edema, Normal pulses


Skin:  No breakdown, No significant lesion, Other (psoriasis )





Labs


LABS





Laboratory Tests








Test


  2/7/17


14:05 2/7/17


18:19 2/7/17


18:40 2/8/17


00:28


 


Nasal Screen MRSA (PCR)


  Negative


(Negative) 


  


  


 


 


Glucose (Fingerstick)


  


  174mg/dL


(70-99) 


  


 


 


Troponin I Quantitative


  


  


  3.922ng/mL


(0.000-0.055) 4.814ng/mL


(0.000-0.055)














Test


  2/8/17


01:10 2/8/17


07:50 2/8/17


10:38 


 


 


Heparin Anti-Xa Act,


Unfractionated 0.20IU/mL


(0.30-0.70) 0.30IU/mL


(0.30-0.70) 


  


 


 


White Blood Count


  


  6.6x10^3/uL


(4.0-11.0) 


  


 


 


Red Blood Count


  


  2.39x10^6/uL


(3.50-5.40) 


  


 


 


Hemoglobin


  


  7.3g/dL


(12.0-15.5) 


  


 


 


Hematocrit


  


  22.5%


(36.0-47.0) 


  


 


 


Mean Corpuscular Volume  94fL ()   


 


Mean Corpuscular Hemoglobin  31pg (25-35)   


 


Mean Corpuscular Hemoglobin


Concent 


  33g/dL (31-37) 


  


  


 


 


Red Cell Distribution Width


  


  15.3%


(11.5-14.5) 


  


 


 


Platelet Count


  


  189x10^3/uL


(140-400) 


  


 


 


Neutrophils (%) (Auto)  78% (31-73)   


 


Lymphocytes (%) (Auto)  11% (24-48)   


 


Monocytes (%) (Auto)  5% (0-9)   


 


Eosinophils (%) (Auto)  5% (0-3)   


 


Basophils (%) (Auto)  1% (0-3)   


 


Neutrophils # (Auto)


  


  5.1x10^3uL


(1.8-7.7) 


  


 


 


Lymphocytes # (Auto)


  


  0.7x10^3/uL


(1.0-4.8) 


  


 


 


Monocytes # (Auto)


  


  0.4x10^3/uL


(0.0-1.1) 


  


 


 


Eosinophils # (Auto)


  


  0.3x10^3/uL


(0.0-0.7) 


  


 


 


Basophils # (Auto)


  


  0.1x10^3/uL


(0.0-0.2) 


  


 


 


Sodium Level


  


  141mmol/L


(136-145) 


  


 


 


Potassium Level


  


  4.5mmol/L


(3.5-5.1) 


  


 


 


Chloride Level


  


  110mmol/L


() 


  


 


 


Carbon Dioxide Level


  


  20mmol/L


(21-32) 


  


 


 


Anion Gap  11 (6-14)   


 


Blood Urea Nitrogen  49mg/dL (7-20)   


 


Creatinine


  


  4.2mg/dL


(0.6-1.0) 


  


 


 


Estimated GFR


(Cockcroft-Gault) 


  10.8 


  


  


 


 


Glucose Level


  


  100mg/dL


(70-99) 


  


 


 


Calcium Level


  


  8.3mg/dL


(8.5-10.1) 


  


 


 


Triglycerides Level


  


  307mg/dL


(0-150) 


  


 


 


Cholesterol Level


  


  351mg/dL


(0-200) 


  


 


 


LDL Cholesterol, Calculated


  


  242mg/dL


(0-100) 


  


 


 


VLDL Cholesterol, Calculated  61mg/dL (0-40)   


 


HDL Cholesterol


  


  48mg/dL


(40-60) 


  


 


 


Cholesterol/HDL Ratio  7.3   


 


Glucose (Fingerstick)


  


  


  95mg/dL


(70-99) 


 











Assessment and Plan


Assessmemt and Plan


 Problems


Medical Problems:


(1) Congestive heart failure


Status: Acute  





(2) NSTEMI (non-ST elevated myocardial infarction)


Status: Acute  








Problems:  





Comment


Review of Relevant


I have reviewed the following items lore (where applicable) has been applied.


Labs





Laboratory Tests








Test


  2/7/17


12:02 2/7/17


14:05 2/7/17


18:19 2/7/17


18:40


 


White Blood Count


  8.3x10^3/uL


(4.0-11.0) 


  


  


 


 


Red Blood Count


  2.69x10^6/uL


(3.50-5.40) 


  


  


 


 


Hemoglobin


  8.1g/dL


(12.0-15.5) 


  


  


 


 


Hematocrit


  25.1%


(36.0-47.0) 


  


  


 


 


Mean Corpuscular Volume 94fL ()    


 


Mean Corpuscular Hemoglobin 30pg (25-35)    


 


Mean Corpuscular Hemoglobin


Concent 32g/dL (31-37) 


  


  


  


 


 


Red Cell Distribution Width


  14.9%


(11.5-14.5) 


  


  


 


 


Platelet Count


  211x10^3/uL


(140-400) 


  


  


 


 


Neutrophils (%) (Auto) 73% (31-73)    


 


Lymphocytes (%) (Auto) 17% (24-48)    


 


Monocytes (%) (Auto) 7% (0-9)    


 


Eosinophils (%) (Auto) 3% (0-3)    


 


Basophils (%) (Auto) 1% (0-3)    


 


Neutrophils # (Auto)


  6.0x10^3uL


(1.8-7.7) 


  


  


 


 


Lymphocytes # (Auto)


  1.4x10^3/uL


(1.0-4.8) 


  


  


 


 


Monocytes # (Auto)


  0.6x10^3/uL


(0.0-1.1) 


  


  


 


 


Eosinophils # (Auto)


  0.2x10^3/uL


(0.0-0.7) 


  


  


 


 


Basophils # (Auto)


  0.1x10^3/uL


(0.0-0.2) 


  


  


 


 


Sodium Level


  141mmol/L


(136-145) 


  


  


 


 


Potassium Level


  4.9mmol/L


(3.5-5.1) 


  


  


 


 


Chloride Level


  111mmol/L


() 


  


  


 


 


Carbon Dioxide Level


  19mmol/L


(21-32) 


  


  


 


 


Anion Gap 11 (6-14)    


 


Blood Urea Nitrogen 49mg/dL (7-20)    


 


Creatinine


  3.9mg/dL


(0.6-1.0) 


  


  


 


 


Estimated GFR


(Cockcroft-Gault) 11.8 


  


  


  


 


 


BUN/Creatinine Ratio 13 (6-20)    


 


Glucose Level


  185mg/dL


(70-99) 


  


  


 


 


Calcium Level


  8.0mg/dL


(8.5-10.1) 


  


  


 


 


Total Bilirubin


  0.2mg/dL


(0.2-1.0) 


  


  


 


 


Aspartate Amino Transf


(AST/SGOT) 17U/L (15-37) 


  


  


  


 


 


Alanine Aminotransferase


(ALT/SGPT) 10U/L (14-59) 


  


  


  


 


 


Alkaline Phosphatase 90U/L ()    


 


Troponin I Quantitative


  2.073ng/mL


(0.000-0.055) 


  


  3.922ng/mL


(0.000-0.055)


 


NT-Pro-B-Type Natriuretic


Peptide 57190pm/mL


(0-124) 


  


  


 


 


Total Protein


  6.2g/dL


(6.4-8.2) 


  


  


 


 


Albumin


  2.6g/dL


(3.4-5.0) 


  


  


 


 


Albumin/Globulin Ratio 0.7 (1.0-1.7)    


 


Nasal Screen MRSA (PCR)


  


  Negative


(Negative) 


  


 


 


Glucose (Fingerstick)


  


  


  174mg/dL


(70-99) 


 














Test


  2/8/17


00:28 2/8/17


01:10 2/8/17


07:50 2/8/17


10:38


 


Troponin I Quantitative


  4.814ng/mL


(0.000-0.055) 


  


  


 


 


Heparin Anti-Xa Act,


Unfractionated 


  0.20IU/mL


(0.30-0.70) 0.30IU/mL


(0.30-0.70) 


 


 


White Blood Count


  


  


  6.6x10^3/uL


(4.0-11.0) 


 


 


Red Blood Count


  


  


  2.39x10^6/uL


(3.50-5.40) 


 


 


Hemoglobin


  


  


  7.3g/dL


(12.0-15.5) 


 


 


Hematocrit


  


  


  22.5%


(36.0-47.0) 


 


 


Mean Corpuscular Volume   94fL ()  


 


Mean Corpuscular Hemoglobin   31pg (25-35)  


 


Mean Corpuscular Hemoglobin


Concent 


  


  33g/dL (31-37) 


  


 


 


Red Cell Distribution Width


  


  


  15.3%


(11.5-14.5) 


 


 


Platelet Count


  


  


  189x10^3/uL


(140-400) 


 


 


Neutrophils (%) (Auto)   78% (31-73)  


 


Lymphocytes (%) (Auto)   11% (24-48)  


 


Monocytes (%) (Auto)   5% (0-9)  


 


Eosinophils (%) (Auto)   5% (0-3)  


 


Basophils (%) (Auto)   1% (0-3)  


 


Neutrophils # (Auto)


  


  


  5.1x10^3uL


(1.8-7.7) 


 


 


Lymphocytes # (Auto)


  


  


  0.7x10^3/uL


(1.0-4.8) 


 


 


Monocytes # (Auto)


  


  


  0.4x10^3/uL


(0.0-1.1) 


 


 


Eosinophils # (Auto)


  


  


  0.3x10^3/uL


(0.0-0.7) 


 


 


Basophils # (Auto)


  


  


  0.1x10^3/uL


(0.0-0.2) 


 


 


Sodium Level


  


  


  141mmol/L


(136-145) 


 


 


Potassium Level


  


  


  4.5mmol/L


(3.5-5.1) 


 


 


Chloride Level


  


  


  110mmol/L


() 


 


 


Carbon Dioxide Level


  


  


  20mmol/L


(21-32) 


 


 


Anion Gap   11 (6-14)  


 


Blood Urea Nitrogen   49mg/dL (7-20)  


 


Creatinine


  


  


  4.2mg/dL


(0.6-1.0) 


 


 


Estimated GFR


(Cockcroft-Gault) 


  


  10.8 


  


 


 


Glucose Level


  


  


  100mg/dL


(70-99) 


 


 


Calcium Level


  


  


  8.3mg/dL


(8.5-10.1) 


 


 


Triglycerides Level


  


  


  307mg/dL


(0-150) 


 


 


Cholesterol Level


  


  


  351mg/dL


(0-200) 


 


 


LDL Cholesterol, Calculated


  


  


  242mg/dL


(0-100) 


 


 


VLDL Cholesterol, Calculated   61mg/dL (0-40)  


 


HDL Cholesterol


  


  


  48mg/dL


(40-60) 


 


 


Cholesterol/HDL Ratio   7.3  


 


Glucose (Fingerstick)


  


  


  


  95mg/dL


(70-99)








Laboratory Tests








Test


  2/7/17


14:05 2/7/17


18:19 2/7/17


18:40 2/8/17


00:28


 


Nasal Screen MRSA (PCR)


  Negative


(Negative) 


  


  


 


 


Glucose (Fingerstick)


  


  174mg/dL


(70-99) 


  


 


 


Troponin I Quantitative


  


  


  3.922ng/mL


(0.000-0.055) 4.814ng/mL


(0.000-0.055)














Test


  2/8/17


01:10 2/8/17


07:50 2/8/17


10:38 


 


 


Heparin Anti-Xa Act,


Unfractionated 0.20IU/mL


(0.30-0.70) 0.30IU/mL


(0.30-0.70) 


  


 


 


White Blood Count


  


  6.6x10^3/uL


(4.0-11.0) 


  


 


 


Red Blood Count


  


  2.39x10^6/uL


(3.50-5.40) 


  


 


 


Hemoglobin


  


  7.3g/dL


(12.0-15.5) 


  


 


 


Hematocrit


  


  22.5%


(36.0-47.0) 


  


 


 


Mean Corpuscular Volume  94fL ()   


 


Mean Corpuscular Hemoglobin  31pg (25-35)   


 


Mean Corpuscular Hemoglobin


Concent 


  33g/dL (31-37) 


  


  


 


 


Red Cell Distribution Width


  


  15.3%


(11.5-14.5) 


  


 


 


Platelet Count


  


  189x10^3/uL


(140-400) 


  


 


 


Neutrophils (%) (Auto)  78% (31-73)   


 


Lymphocytes (%) (Auto)  11% (24-48)   


 


Monocytes (%) (Auto)  5% (0-9)   


 


Eosinophils (%) (Auto)  5% (0-3)   


 


Basophils (%) (Auto)  1% (0-3)   


 


Neutrophils # (Auto)


  


  5.1x10^3uL


(1.8-7.7) 


  


 


 


Lymphocytes # (Auto)


  


  0.7x10^3/uL


(1.0-4.8) 


  


 


 


Monocytes # (Auto)


  


  0.4x10^3/uL


(0.0-1.1) 


  


 


 


Eosinophils # (Auto)


  


  0.3x10^3/uL


(0.0-0.7) 


  


 


 


Basophils # (Auto)


  


  0.1x10^3/uL


(0.0-0.2) 


  


 


 


Sodium Level


  


  141mmol/L


(136-145) 


  


 


 


Potassium Level


  


  4.5mmol/L


(3.5-5.1) 


  


 


 


Chloride Level


  


  110mmol/L


() 


  


 


 


Carbon Dioxide Level


  


  20mmol/L


(21-32) 


  


 


 


Anion Gap  11 (6-14)   


 


Blood Urea Nitrogen  49mg/dL (7-20)   


 


Creatinine


  


  4.2mg/dL


(0.6-1.0) 


  


 


 


Estimated GFR


(Cockcroft-Gault) 


  10.8 


  


  


 


 


Glucose Level


  


  100mg/dL


(70-99) 


  


 


 


Calcium Level


  


  8.3mg/dL


(8.5-10.1) 


  


 


 


Triglycerides Level


  


  307mg/dL


(0-150) 


  


 


 


Cholesterol Level


  


  351mg/dL


(0-200) 


  


 


 


LDL Cholesterol, Calculated


  


  242mg/dL


(0-100) 


  


 


 


VLDL Cholesterol, Calculated  61mg/dL (0-40)   


 


HDL Cholesterol


  


  48mg/dL


(40-60) 


  


 


 


Cholesterol/HDL Ratio  7.3   


 


Glucose (Fingerstick)


  


  


  95mg/dL


(70-99) 


 








Medications





 Current Medications


Aspirin 324 mg 324 mg 1X  ONCE PO  Last administered on 2/7/17at 12:59;  Start 2 /7/17 at 12:45;  Stop 2/7/17 at 12:48;  Status DC


Heparin Sodium/ Dextrose 500 ml @  19 mls/hr CONT  PRN IV SEE I/O RECORD Last 

administered on 2/7/17at 13:03;  Start 2/7/17 at 12:45


Heparin Sodium (Porcine) 1,950 unit PRN Q6HRS  PRN IV FOR UFH LEVEL LESS THAN 

0.2 Last administered on 2/7/17at 13:01;  Start 2/7/17 at 12:45


Ondansetron HCl (Zofran) 4 mg PRN Q8HRS  PRN IV NAUSEA/VOMITING;  Start 2/7/17 

at 12:45;  Stop 2/8/17 at 12:44


Morphine Sulfate 4 mg PRN Q2HR  PRN IV PAIN Last administered on 2/7/17at 14:03

;  Start 2/7/17 at 12:45;  Stop 2/8/17 at 12:44


Nitroglycerin (Nitrostat) 0.4 mg PRN Q5MIN  PRN SL CHEST PAIN Last administered 

on 2/7/17at 14:03;  Start 2/7/17 at 12:45;  Stop 2/8/17 at 12:44


Amlodipine Besylate (Norvasc) 5 mg DAILYWLUN PO  Last administered on 2/8/17at 

08:32;  Start 2/7/17 at 14:00


Aspirin (Ecotrin) 81 mg DAILYWBKFT PO ;  Start 2/7/17 at 14:00


Atorvastatin Calcium (Lipitor) 80 mg QHS PO  Last administered on 2/7/17at 20:59

;  Start 2/7/17 at 21:00


Carvedilol (Coreg) 25 mg BIDWMEALS PO  Last administered on 2/8/17at 08:31;  

Start 2/7/17 at 17:00


Clopidogrel Bisulfate (Plavix) 75 mg DAILY PO  Last administered on 2/7/17at 14:

28;  Start 2/7/17 at 14:00;  Stop 2/8/17 at 08:41;  Status DC


Isosorbide Mononitrate (Imdur) 30 mg DAILY PO  Last administered on 2/7/17at 14:

30;  Start 2/7/17 at 14:00


Furosemide (Lasix) 40 mg 1X  ONCE IVP  Last administered on 2/7/17at 14:30;  

Start 2/7/17 at 14:00;  Stop 2/7/17 at 14:23;  Status DC


Furosemide 40 mg 40 mg 1X  ONCE IVP  Last administered on 2/7/17at 14:30;  

Start 2/7/17 at 14:30;  Stop 2/7/17 at 14:38;  Status DC


Nitroglycerin/ Dextrose (Nitroglycerin Drip) 250 ml @ 0 mls/hr CONT  PRN IV SEE 

I/O RECORD Last administered on 2/7/17at 15:23;  Start 2/7/17 at 14:30


Acetaminophen (Tylenol) 325 mg PRN Q6HRS  PRN PO MILD PAIN / TEMP;  Start 2/7/ 17 at 15:00


Acetaminophen/ Hydrocodone Bitart (Lortab 5/325) 1 tab PRN Q6HRS  PRN PO 

MODERATE TO SEVERE PAIN;  Start 2/7/17 at 15:00


Hydralazine HCl (Apresoline) 10 mg PRN Q4HRS  PRN IVP ELEVATED BP, SEE COMMENTS

;  Start 2/7/17 at 15:00


Ondansetron HCl (Zofran) 4 mg PRN Q8HRS  PRN IV NAUSEA/VOMITING;  Start 2/7/17 

at 15:00


Albuterol Sulfate (Ventolin Neb Soln) 2.5 mg PRN Q4HRS  PRN NEB SHORTNESS OF 

BREATH;  Start 2/7/17 at 15:00


Insulin Aspart (Novolog) 0-7 UNITS TIDWMEALS SQ ;  Start 2/7/17 at 17:00


Dextrose 12.5 gm PRN Q15MIN  PRN IV SEE COMMENTS;  Start 2/7/17 at 15:00


Info 1 each 1 each PRN DAILY  PRN MC SEE COMMENTS Last administered on 2/8/17at 

08:34;  Start 2/7/17 at 17:00


Magnesium Sulfate/ Dextrose (Magnesium Sulfate PREMIX 2GM) 50 ml @ 25 mls/hr 

PRN DAILY  PRN IV for Mag < 1.7 on am labs;  Start 2/8/17 at 11:15





Active Scripts


Active


Lisinopril 20 Mg Tablet 20 Mg PO QHS


Isosorbide Mononitrate Er (Isosorbide Mononitrate) 30 Mg Tab.er.24h 30 Mg PO 

DAILY


Hydralazine Hcl 50 Mg Tablet 100 Mg PO TID


Carvedilol 12.5 Mg Tablet 25 Mg PO BIDWMEALS


Atorvastatin Calcium 40 Mg Tablet 80 Mg PO QHS


Aspirin Ec (Aspirin) 81 Mg Tablet.dr 81 Mg PO DAILYWBKFT


Amlodipine Besylate 5 Mg Tablet 5 Mg PO DAILYWLUN


Reported


Clopidogrel (Clopidogrel Bisulfate) 75 Mg Tablet 75 Mg PO DAILY


Glyburide 5 Mg Tablet 1 Tab PO BIDWMEALS


Lisinopril 20 Mg Tablet 1 Tab PO DAILY


Vitals/I & O





 Vital Sign - Last 24 Hours








 2/7/17 2/7/17 2/7/17 2/7/17





 12:15 12:45 13:15 13:42


 


Pulse 118 102 104 


 


B/P 175/83 193/91 212/95 156/100


 


Pulse Ox 99 100 100 


 


O2 Delivery Room Air Room Air Room Air 





 2/7/17 2/7/17 2/7/17 2/7/17





 13:50 14:00 14:00 14:03


 


Temp 98.6   





 98.6   


 


Pulse 130  122 


 


Resp 40  42 26


 


B/P 202/95  185/113 


 


Pulse Ox 97  89 86


 


O2 Delivery Nasal Cannula Non-Rebreather Nasal Cannula Nasal Cannula


 


O2 Flow Rate 2.0 15.0 2.0 2.0


 


    





    





 2/7/17 2/7/17 2/7/17 2/7/17





 14:03 14:30 14:32 15:00


 


Pulse 130 130 130 104


 


Resp    18


 


B/P 202/95 202/95 202/95 195/88


 


Pulse Ox    98


 


O2 Delivery    Nasal Cannula


 


O2 Flow Rate    2.0





 2/7/17 2/7/17 2/7/17 2/7/17





 15:10 16:00 16:00 17:00


 


Temp   98.7 





   98.7 


 


Pulse   93 90


 


Resp 17  15 15


 


B/P   179/86 181/92


 


Pulse Ox 99  100 100


 


O2 Delivery  Nasal Cannula Nasal Cannula Room Air


 


O2 Flow Rate  2.0 2.0 2.0


 


    





    





 2/7/17 2/7/17 2/7/17 2/7/17





 18:00 18:21 19:00 19:45


 


Pulse 90 90 80 


 


Resp 14  15 


 


B/P 162/86 165/88 157/77 


 


Pulse Ox 100  100 


 


O2 Delivery Nasal Cannula  Nasal Cannula Nasal Cannula


 


O2 Flow Rate 2.0  2.0 2.0





 2/7/17 2/7/17 2/7/17 2/7/17





 20:00 21:00 22:00 23:00


 


Temp 98.8   





 98.8   


 


Pulse 73 78 70 75


 


Resp 17 27 15 12


 


B/P 116/59 118/55 118/60 125/64


 


Pulse Ox 100 100 100 100


 


O2 Delivery Nasal Cannula Nasal Cannula Room Air Room Air


 


O2 Flow Rate 2.0 2.0  


 


    





    





 2/7/17 2/8/17 2/8/17 2/8/17





 23:55 00:00 01:00 02:00


 


Temp  98.6  





  98.6  


 


Pulse  72 79 76


 


Resp  32 19 16


 


B/P  116/60 116/64 144/68


 


Pulse Ox  100 100 100


 


O2 Delivery Room Air Room Air Room Air Room Air


 


    





    





 2/8/17 2/8/17 2/8/17 2/8/17





 03:00 04:00 04:00 05:00


 


Temp   98.8 





   98.8 


 


Pulse 75  73 79


 


Resp 23  21 23


 


B/P 137/64  132/60 133/61


 


Pulse Ox 98  98 96


 


O2 Delivery Room Air Room Air Room Air Room Air


 


    





    





 2/8/17 2/8/17 2/8/17 2/8/17





 06:00 07:00 07:54 08:00


 


Temp    99.0





    99.0


 


Pulse 85 86  86


 


Resp 16 22  20


 


B/P 142/68 151/70  146/65


 


Pulse Ox 94 97  98


 


O2 Delivery Room Air Room Air Room Air Room Air


 


    





    





 2/8/17 2/8/17  





 08:31 08:32  


 


B/P 153/70 153/70  














 Intake and Output   


 


 2/7/17 2/7/17 2/8/17





 15:00 23:00 07:00


 


Intake Total  102.02 ml 235 ml


 


Output Total 100 ml 750 ml 1025 ml


 


Balance -100 ml -647.98 ml -790 ml














OSMEL VALDEZ MD Feb 8, 2017 12:05

## 2017-02-09 NOTE — PDOC
WENCESLAO FRASER APRN 2/9/17 1531:


CARDIO Progress Notes


Date and Time


Date of Service


2/9/17


Time of Evaluation


1250





Subjective


Subjective:  No Chest Pain, No shortness of breath, No Palpitations, No 

Dizziness, Other (feeling better, weak)





Vitals


Vitals





 Vital Signs








  Date Time  Temp Pulse Resp B/P Pulse Ox O2 Delivery O2 Flow Rate FiO2


 


2/9/17 15:00  80 29 133/54 99 Room Air  


 


2/9/17 12:00 98.6       





 98.6       


 


2/8/17 09:00       2.0 








Weight


Weight [ ]





Input and Output


Intake and Output











 Intake and Output 


 


 2/9/17





 07:00


 


Intake Total 730.6 ml


 


Output Total 1200 ml


 


Balance -469.4 ml


 


 


 


Intake Oral 200 ml


 


IV Total 530.6 ml


 


Output Urine Total 1200 ml











Laboratory


Labs





Laboratory Tests








Test


  2/9/17


06:24 2/9/17


08:29 2/9/17


13:46


 


White Blood Count


  6.4x10^3/uL


(4.0-11.0) 


  


 


 


Red Blood Count


  2.31x10^6/uL


(3.50-5.40) 


  


 


 


Hemoglobin


  7.0g/dL


(12.0-15.5) 


  


 


 


Hematocrit


  21.1%


(36.0-47.0) 


  


 


 


Mean Corpuscular Volume 91fL ()   


 


Mean Corpuscular Hemoglobin 30pg (25-35)   


 


Mean Corpuscular Hemoglobin


Concent 33g/dL (31-37) 


  


  


 


 


Red Cell Distribution Width


  14.5%


(11.5-14.5) 


  


 


 


Platelet Count


  169x10^3/uL


(140-400) 


  


 


 


Neutrophils (%) (Auto) 73% (31-73)   


 


Lymphocytes (%) (Auto) 19% (24-48)   


 


Monocytes (%) (Auto) 6% (0-9)   


 


Eosinophils (%) (Auto) 2% (0-3)   


 


Basophils (%) (Auto) 0% (0-3)   


 


Neutrophils # (Auto)


  4.6x10^3uL


(1.8-7.7) 


  


 


 


Lymphocytes # (Auto)


  1.2x10^3/uL


(1.0-4.8) 


  


 


 


Monocytes # (Auto)


  0.4x10^3/uL


(0.0-1.1) 


  


 


 


Eosinophils # (Auto)


  0.1x10^3/uL


(0.0-0.7) 


  


 


 


Basophils # (Auto)


  0.0x10^3/uL


(0.0-0.2) 


  


 


 


Reticulocyte Count (auto) 1.9% (0.5-2.5)   


 


Sodium Level


  139mmol/L


(136-145) 


  


 


 


Potassium Level


  3.7mmol/L


(3.5-5.1) 


  


 


 


Chloride Level


  102mmol/L


() 


  


 


 


Carbon Dioxide Level


  30mmol/L


(21-32) 


  


 


 


Anion Gap 7 (6-14)   


 


Blood Urea Nitrogen 23mg/dL (7-20)   


 


Creatinine


  2.9mg/dL


(0.6-1.0) 


  


 


 


Estimated GFR


(Cockcroft-Gault) 16.6 


  


  


 


 


Glucose Level


  164mg/dL


(70-99) 


  


 


 


Calcium Level


  7.5mg/dL


(8.5-10.1) 


  


 


 


Phosphorus Level


  3.0mg/dL


(2.6-4.7) 


  


 


 


Magnesium Level


  1.8mg/dL


(1.8-2.4) 


  


 


 


Iron Level


  26ug/dL


() 


  


 


 


Total Iron Binding Capacity


  237ug/dL


(250-450) 


  


 


 


Iron Saturation 11% (15-34)   


 


Ferritin


  62ng/mL


(8-252) 


  


 


 


Albumin


  2.1g/dL


(3.4-5.0) 


  


 


 


Glucose (Fingerstick)


  


  139mg/dL


(70-99) 99mg/dL


(70-99)











Physical Exam


HEENT:  Neck Supple W Full Motion


Chest:  Symmetric


LUNGS:  Other (diminished bases )


Heart:  S1S2, RRR, other (2/6 systolic murmur )


Abdomen:  Soft N/T


Extremities:  2+ Dorsalis Pedis, No Edema


Neurology:  alert, oriented, follow commands





Assessment


Assessment


1.  Acute on chronic combined systolic and diastolic HF


2.  NSTEMI 


3.  CAD   


4.  Ischemic cardiomyopathy; LVEF 25%


5.  Malignant HTN


6.  HLP


7.  WALTER with CKD, HD initiated 


8.  Noncompliance secondary to financial constraints. 














Recommendations





D/c heparin. Resume ASA and Plavix


Fluid offloading in HD per nephrology


Hgb 7.0 today. Wishes for no blood products


Significant improvement in symptoms. CP free. SOA resolved. Will manage 

medically for now unless patient becomes symptomatic. 


Continue supportive care





MANDO OLSON MD 2/9/17 [id number]:


[facility] Progress Notes


Plan


Plan


Pt. seen and examined. Agree with above NP note. 


No acute events overnight. Patient with anemia after initiation of HD 


Given that she is doing well on2L NC, cp free and due to risk of bleeding with 

PCI, will defer for now. 


If in the future she has CP, could consider PCI, for now treat with asa, 

plavix. 


Anemia w/u per PCP. 


Will follow along.








WENCESLAO FRASER Feb 9, 2017 15:31


MANDO OLSON MD Feb 9, 2017 23:16

## 2017-02-09 NOTE — PDOC
PROGRESS NOTES


Chief Complaint


Chief Complaint


cc: sob 





A//P


1.  Acute on chronic systolic congestive heart failure.


2.  Non-ST-segment elevation myocardial infarction.


3.  WALTER


4.  Ischemic cardiomyopathy.


5.  Malignant hypertension, Better


6.  Anemia


7.  Hyperlipidemia.


8.  Psoriasis.


9. CAD


10. Anemia





Plan 


Temp HD catheter  and HD per neprology


pt declined to have blood transfusions


continue heparin gtt per ACS protocol 


BP controlled, cardiology following 


intake and out put. 


Cardiac cath pericardiology 


SSI 


Lipitor 


labs reviewed 


Monitor hemoglobin 


Prognosis guarded. 


family at bedside, all questions answered.





History of Present Illness


History of Present Illness


sob better


no fever


no chills. 


no chest pain





Vitals


Vitals





 Vital Signs








  Date Time  Temp Pulse Resp B/P Pulse Ox O2 Delivery O2 Flow Rate FiO2


 


2/9/17 20:48  79  116/53    


 


2/9/17 20:00      Room Air  


 


2/9/17 20:00 99.3  20  93   





 99.3       


 


2/8/17 09:00       2.0 











Physical Exam


General:  Alert, Oriented X3, Cooperative, moderate distress (now on NRB)


Heart:  Normal S1, Normal S2, Other (tele: ST, 2/6 systolci murmur )


Lungs:  Clear, Other


Abdomen:  Soft, No tenderness


Extremities:  No cyanosis, No edema, Normal pulses


Skin:  No breakdown, No significant lesion, Other (psoriasis )





Labs


LABS





Laboratory Tests








Test


  2/9/17


06:24 2/9/17


08:29 2/9/17


13:46 2/9/17


17:59


 


White Blood Count


  6.4x10^3/uL


(4.0-11.0) 


  


  


 


 


Red Blood Count


  2.31x10^6/uL


(3.50-5.40) 


  


  


 


 


Hemoglobin


  7.0g/dL


(12.0-15.5) 


  


  


 


 


Hematocrit


  21.1%


(36.0-47.0) 


  


  


 


 


Mean Corpuscular Volume 91fL ()    


 


Mean Corpuscular Hemoglobin 30pg (25-35)    


 


Mean Corpuscular Hemoglobin


Concent 33g/dL (31-37) 


  


  


  


 


 


Red Cell Distribution Width


  14.5%


(11.5-14.5) 


  


  


 


 


Platelet Count


  169x10^3/uL


(140-400) 


  


  


 


 


Neutrophils (%) (Auto) 73% (31-73)    


 


Lymphocytes (%) (Auto) 19% (24-48)    


 


Monocytes (%) (Auto) 6% (0-9)    


 


Eosinophils (%) (Auto) 2% (0-3)    


 


Basophils (%) (Auto) 0% (0-3)    


 


Neutrophils # (Auto)


  4.6x10^3uL


(1.8-7.7) 


  


  


 


 


Lymphocytes # (Auto)


  1.2x10^3/uL


(1.0-4.8) 


  


  


 


 


Monocytes # (Auto)


  0.4x10^3/uL


(0.0-1.1) 


  


  


 


 


Eosinophils # (Auto)


  0.1x10^3/uL


(0.0-0.7) 


  


  


 


 


Basophils # (Auto)


  0.0x10^3/uL


(0.0-0.2) 


  


  


 


 


Reticulocyte Count (auto) 1.9% (0.5-2.5)    


 


Sodium Level


  139mmol/L


(136-145) 


  


  


 


 


Potassium Level


  3.7mmol/L


(3.5-5.1) 


  


  


 


 


Chloride Level


  102mmol/L


() 


  


  


 


 


Carbon Dioxide Level


  30mmol/L


(21-32) 


  


  


 


 


Anion Gap 7 (6-14)    


 


Blood Urea Nitrogen 23mg/dL (7-20)    


 


Creatinine


  2.9mg/dL


(0.6-1.0) 


  


  


 


 


Estimated GFR


(Cockcroft-Gault) 16.6 


  


  


  


 


 


Glucose Level


  164mg/dL


(70-99) 


  


  


 


 


Calcium Level


  7.5mg/dL


(8.5-10.1) 


  


  


 


 


Phosphorus Level


  3.0mg/dL


(2.6-4.7) 


  


  


 


 


Magnesium Level


  1.8mg/dL


(1.8-2.4) 


  


  


 


 


Iron Level


  26ug/dL


() 


  


  


 


 


Total Iron Binding Capacity


  237ug/dL


(250-450) 


  


  


 


 


Iron Saturation 11% (15-34)    


 


Ferritin


  62ng/mL


(8-252) 


  


  


 


 


Albumin


  2.1g/dL


(3.4-5.0) 


  


  


 


 


Vitamin B12 Level


  286pg/mL


(211-946) 


  


  


 


 


Glucose (Fingerstick)


  


  139mg/dL


(70-99) 99mg/dL


(70-99) 170mg/dL


(70-99)











Assessment and Plan


Assessmemt and Plan


 Problems


Medical Problems:


(1) Congestive heart failure


Status: Acute  





(2) NSTEMI (non-ST elevated myocardial infarction)


Status: Acute  








Problems:  





Comment


Review of Relevant


I have reviewed the following items lore (where applicable) has been applied.


Labs





Laboratory Tests








Test


  2/8/17


00:28 2/8/17


01:10 2/8/17


07:50 2/8/17


10:38


 


Troponin I Quantitative


  4.814ng/mL


(0.000-0.055) 


  


  


 


 


Heparin Anti-Xa Act,


Unfractionated 


  0.20IU/mL


(0.30-0.70) 0.30IU/mL


(0.30-0.70) 


 


 


White Blood Count


  


  


  6.6x10^3/uL


(4.0-11.0) 


 


 


Red Blood Count


  


  


  2.39x10^6/uL


(3.50-5.40) 


 


 


Hemoglobin


  


  


  7.3g/dL


(12.0-15.5) 


 


 


Hematocrit


  


  


  22.5%


(36.0-47.0) 


 


 


Mean Corpuscular Volume   94fL ()  


 


Mean Corpuscular Hemoglobin   31pg (25-35)  


 


Mean Corpuscular Hemoglobin


Concent 


  


  33g/dL (31-37) 


  


 


 


Red Cell Distribution Width


  


  


  15.3%


(11.5-14.5) 


 


 


Platelet Count


  


  


  189x10^3/uL


(140-400) 


 


 


Neutrophils (%) (Auto)   78% (31-73)  


 


Lymphocytes (%) (Auto)   11% (24-48)  


 


Monocytes (%) (Auto)   5% (0-9)  


 


Eosinophils (%) (Auto)   5% (0-3)  


 


Basophils (%) (Auto)   1% (0-3)  


 


Neutrophils # (Auto)


  


  


  5.1x10^3uL


(1.8-7.7) 


 


 


Lymphocytes # (Auto)


  


  


  0.7x10^3/uL


(1.0-4.8) 


 


 


Monocytes # (Auto)


  


  


  0.4x10^3/uL


(0.0-1.1) 


 


 


Eosinophils # (Auto)


  


  


  0.3x10^3/uL


(0.0-0.7) 


 


 


Basophils # (Auto)


  


  


  0.1x10^3/uL


(0.0-0.2) 


 


 


Sodium Level


  


  


  141mmol/L


(136-145) 


 


 


Potassium Level


  


  


  4.5mmol/L


(3.5-5.1) 


 


 


Chloride Level


  


  


  110mmol/L


() 


 


 


Carbon Dioxide Level


  


  


  20mmol/L


(21-32) 


 


 


Anion Gap   11 (6-14)  


 


Blood Urea Nitrogen   49mg/dL (7-20)  


 


Creatinine


  


  


  4.2mg/dL


(0.6-1.0) 


 


 


Estimated GFR


(Cockcroft-Gault) 


  


  10.8 


  


 


 


Glucose Level


  


  


  100mg/dL


(70-99) 


 


 


Calcium Level


  


  


  8.3mg/dL


(8.5-10.1) 


 


 


Triglycerides Level


  


  


  307mg/dL


(0-150) 


 


 


Cholesterol Level


  


  


  351mg/dL


(0-200) 


 


 


LDL Cholesterol, Calculated


  


  


  242mg/dL


(0-100) 


 


 


VLDL Cholesterol, Calculated   61mg/dL (0-40)  


 


HDL Cholesterol


  


  


  48mg/dL


(40-60) 


 


 


Cholesterol/HDL Ratio   7.3  


 


Glucose (Fingerstick)


  


  


  


  95mg/dL


(70-99)














Test


  2/8/17


11:40 2/8/17


14:12 2/8/17


15:05 2/9/17


06:24


 


Prothrombin Time


  13.5SEC


(11.7-14.0) 


  


  


 


 


Prothromb Time International


Ratio 1.1 (0.8-1.1) 


  


  


  


 


 


Activated Partial


Thromboplast Time 95SEC (24-38) 


  


  


  


 


 


Estimated GFR (Non-


American 11  (>59) 


  


  


  


 


 


EGFR  13  (>59)    


 


PTH (Intact) Specimen


Description Comment (.) 


  


  


  


 


 


Parathyroid Hormone (Intact)


  192pg/mL


(15-65) 


  


  


 


 


Calcium (PTH Intact)


  8.0mg/dL


(8.7-10.2) 


  


  


 


 


Creatinine (PTH Intact)


  4.14mg/dL


(0.57-1.00) 


  


  


 


 


Phosphorus (PTH Intact)


  5.4mg/dL


(2.5-4.5) 


  


  


 


 


Hepatitis A IgM Antibody


  Negative


(Negative) 


  


  


 


 


Hepatitis B Surface Antigen


  Negative


(Negative) 


  


  


 


 


Hepatitis B Surface Antibody


  Non reactive


(.) 


  


  


 


 


Hepatitis B Core Total


Antibody Negative


(Negative) 


  


  


 


 


Hepatitis B Core IgM Antibody


  Negative


(Negative) 


  


  


 


 


Hepatitis C Antibody


  <0.1s/co ratio


(0.0-0.9) 


  


  


 


 


Glucose (Fingerstick)


  


  103mg/dL


(70-99) 


  


 


 


Heparin Anti-Xa Act,


Unfractionated 


  


  0.38IU/mL


(0.30-0.70) 


 


 


White Blood Count


  


  


  


  6.4x10^3/uL


(4.0-11.0)


 


Red Blood Count


  


  


  


  2.31x10^6/uL


(3.50-5.40)


 


Hemoglobin


  


  


  


  7.0g/dL


(12.0-15.5)


 


Hematocrit


  


  


  


  21.1%


(36.0-47.0)


 


Mean Corpuscular Volume    91fL () 


 


Mean Corpuscular Hemoglobin    30pg (25-35) 


 


Mean Corpuscular Hemoglobin


Concent 


  


  


  33g/dL (31-37) 


 


 


Red Cell Distribution Width


  


  


  


  14.5%


(11.5-14.5)


 


Platelet Count


  


  


  


  169x10^3/uL


(140-400)


 


Neutrophils (%) (Auto)    73% (31-73) 


 


Lymphocytes (%) (Auto)    19% (24-48) 


 


Monocytes (%) (Auto)    6% (0-9) 


 


Eosinophils (%) (Auto)    2% (0-3) 


 


Basophils (%) (Auto)    0% (0-3) 


 


Neutrophils # (Auto)


  


  


  


  4.6x10^3uL


(1.8-7.7)


 


Lymphocytes # (Auto)


  


  


  


  1.2x10^3/uL


(1.0-4.8)


 


Monocytes # (Auto)


  


  


  


  0.4x10^3/uL


(0.0-1.1)


 


Eosinophils # (Auto)


  


  


  


  0.1x10^3/uL


(0.0-0.7)


 


Basophils # (Auto)


  


  


  


  0.0x10^3/uL


(0.0-0.2)


 


Reticulocyte Count (auto)    1.9% (0.5-2.5) 


 


Sodium Level


  


  


  


  139mmol/L


(136-145)


 


Potassium Level


  


  


  


  3.7mmol/L


(3.5-5.1)


 


Chloride Level


  


  


  


  102mmol/L


()


 


Carbon Dioxide Level


  


  


  


  30mmol/L


(21-32)


 


Anion Gap    7 (6-14) 


 


Blood Urea Nitrogen    23mg/dL (7-20) 


 


Creatinine


  


  


  


  2.9mg/dL


(0.6-1.0)


 


Estimated GFR


(Cockcroft-Gault) 


  


  


  16.6 


 


 


Glucose Level


  


  


  


  164mg/dL


(70-99)


 


Calcium Level


  


  


  


  7.5mg/dL


(8.5-10.1)


 


Phosphorus Level


  


  


  


  3.0mg/dL


(2.6-4.7)


 


Magnesium Level


  


  


  


  1.8mg/dL


(1.8-2.4)


 


Iron Level


  


  


  


  26ug/dL


()


 


Total Iron Binding Capacity


  


  


  


  237ug/dL


(250-450)


 


Iron Saturation    11% (15-34) 


 


Ferritin


  


  


  


  62ng/mL


(8-252)


 


Albumin


  


  


  


  2.1g/dL


(3.4-5.0)


 


Vitamin B12 Level


  


  


  


  286pg/mL


(211-946)














Test


  2/9/17


08:29 2/9/17


13:46 2/9/17


17:59 


 


 


Glucose (Fingerstick)


  139mg/dL


(70-99) 99mg/dL


(70-99) 170mg/dL


(70-99) 


 








Laboratory Tests








Test


  2/9/17


06:24 2/9/17


08:29 2/9/17


13:46 2/9/17


17:59


 


White Blood Count


  6.4x10^3/uL


(4.0-11.0) 


  


  


 


 


Red Blood Count


  2.31x10^6/uL


(3.50-5.40) 


  


  


 


 


Hemoglobin


  7.0g/dL


(12.0-15.5) 


  


  


 


 


Hematocrit


  21.1%


(36.0-47.0) 


  


  


 


 


Mean Corpuscular Volume 91fL ()    


 


Mean Corpuscular Hemoglobin 30pg (25-35)    


 


Mean Corpuscular Hemoglobin


Concent 33g/dL (31-37) 


  


  


  


 


 


Red Cell Distribution Width


  14.5%


(11.5-14.5) 


  


  


 


 


Platelet Count


  169x10^3/uL


(140-400) 


  


  


 


 


Neutrophils (%) (Auto) 73% (31-73)    


 


Lymphocytes (%) (Auto) 19% (24-48)    


 


Monocytes (%) (Auto) 6% (0-9)    


 


Eosinophils (%) (Auto) 2% (0-3)    


 


Basophils (%) (Auto) 0% (0-3)    


 


Neutrophils # (Auto)


  4.6x10^3uL


(1.8-7.7) 


  


  


 


 


Lymphocytes # (Auto)


  1.2x10^3/uL


(1.0-4.8) 


  


  


 


 


Monocytes # (Auto)


  0.4x10^3/uL


(0.0-1.1) 


  


  


 


 


Eosinophils # (Auto)


  0.1x10^3/uL


(0.0-0.7) 


  


  


 


 


Basophils # (Auto)


  0.0x10^3/uL


(0.0-0.2) 


  


  


 


 


Reticulocyte Count (auto) 1.9% (0.5-2.5)    


 


Sodium Level


  139mmol/L


(136-145) 


  


  


 


 


Potassium Level


  3.7mmol/L


(3.5-5.1) 


  


  


 


 


Chloride Level


  102mmol/L


() 


  


  


 


 


Carbon Dioxide Level


  30mmol/L


(21-32) 


  


  


 


 


Anion Gap 7 (6-14)    


 


Blood Urea Nitrogen 23mg/dL (7-20)    


 


Creatinine


  2.9mg/dL


(0.6-1.0) 


  


  


 


 


Estimated GFR


(Cockcroft-Gault) 16.6 


  


  


  


 


 


Glucose Level


  164mg/dL


(70-99) 


  


  


 


 


Calcium Level


  7.5mg/dL


(8.5-10.1) 


  


  


 


 


Phosphorus Level


  3.0mg/dL


(2.6-4.7) 


  


  


 


 


Magnesium Level


  1.8mg/dL


(1.8-2.4) 


  


  


 


 


Iron Level


  26ug/dL


() 


  


  


 


 


Total Iron Binding Capacity


  237ug/dL


(250-450) 


  


  


 


 


Iron Saturation 11% (15-34)    


 


Ferritin


  62ng/mL


(8-252) 


  


  


 


 


Albumin


  2.1g/dL


(3.4-5.0) 


  


  


 


 


Vitamin B12 Level


  286pg/mL


(211-946) 


  


  


 


 


Glucose (Fingerstick)


  


  139mg/dL


(70-99) 99mg/dL


(70-99) 170mg/dL


(70-99)








Medications





 Current Medications


Aspirin 324 mg 324 mg 1X  ONCE PO  Last administered on 2/7/17at 12:59;  Start 2 /7/17 at 12:45;  Stop 2/7/17 at 12:48;  Status DC


Heparin Sodium/ Dextrose 500 ml @  19 mls/hr CONT  PRN IV SEE I/O RECORD Last 

administered on 2/8/17at 14:45;  Start 2/7/17 at 12:45;  Stop 2/9/17 at 15:30;  

Status DC


Heparin Sodium (Porcine) 1,950 unit PRN Q6HRS  PRN IV FOR UFH LEVEL LESS THAN 

0.2 Last administered on 2/7/17at 13:01;  Start 2/7/17 at 12:45;  Stop 2/9/17 

at 15:30;  Status DC


Ondansetron HCl (Zofran) 4 mg PRN Q8HRS  PRN IV NAUSEA/VOMITING;  Start 2/7/17 

at 12:45;  Stop 2/8/17 at 12:44;  Status DC


Morphine Sulfate 4 mg PRN Q2HR  PRN IV PAIN Last administered on 2/7/17at 14:03

;  Start 2/7/17 at 12:45;  Stop 2/8/17 at 12:44;  Status DC


Nitroglycerin (Nitrostat) 0.4 mg PRN Q5MIN  PRN SL CHEST PAIN Last administered 

on 2/7/17at 14:03;  Start 2/7/17 at 12:45;  Stop 2/8/17 at 12:44;  Status DC


Amlodipine Besylate (Norvasc) 5 mg DAILYWLUN PO  Last administered on 2/8/17at 

08:32;  Start 2/7/17 at 14:00;  Stop 2/8/17 at 16:02;  Status DC


Aspirin (Ecotrin) 81 mg DAILYWBKFT PO  Last administered on 2/9/17at 08:48;  

Start 2/7/17 at 14:00


Atorvastatin Calcium (Lipitor) 80 mg QHS PO  Last administered on 2/9/17at 20:48

;  Start 2/7/17 at 21:00


Carvedilol (Coreg) 25 mg BIDWMEALS PO  Last administered on 2/9/17at 18:04;  

Start 2/7/17 at 17:00


Clopidogrel Bisulfate (Plavix) 75 mg DAILY PO  Last administered on 2/7/17at 14:

28;  Start 2/7/17 at 14:00;  Stop 2/8/17 at 08:41;  Status DC


Isosorbide Mononitrate (Imdur) 30 mg DAILY PO  Last administered on 2/9/17at 08:

49;  Start 2/7/17 at 14:00


Furosemide (Lasix) 40 mg 1X  ONCE IVP  Last administered on 2/7/17at 14:30;  

Start 2/7/17 at 14:00;  Stop 2/7/17 at 14:23;  Status DC


Furosemide 40 mg 40 mg 1X  ONCE IVP  Last administered on 2/7/17at 14:30;  

Start 2/7/17 at 14:30;  Stop 2/7/17 at 14:38;  Status DC


Nitroglycerin/ Dextrose (Nitroglycerin Drip) 250 ml @ 0 mls/hr CONT  PRN IV SEE 

I/O RECORD Last administered on 2/7/17at 15:23;  Start 2/7/17 at 14:30;  Stop 2/ 8/17 at 16:02;  Status DC


Acetaminophen (Tylenol) 325 mg PRN Q6HRS  PRN PO MILD PAIN / TEMP;  Start 2/7/ 17 at 15:00


Acetaminophen/ Hydrocodone Bitart (Lortab 5/325) 1 tab PRN Q6HRS  PRN PO 

MODERATE TO SEVERE PAIN;  Start 2/7/17 at 15:00


Hydralazine HCl (Apresoline) 10 mg PRN Q4HRS  PRN IVP ELEVATED BP, SEE COMMENTS

;  Start 2/7/17 at 15:00


Ondansetron HCl (Zofran) 4 mg PRN Q8HRS  PRN IV NAUSEA/VOMITING;  Start 2/7/17 

at 15:00


Albuterol Sulfate (Ventolin Neb Soln) 2.5 mg PRN Q4HRS  PRN NEB SHORTNESS OF 

BREATH;  Start 2/7/17 at 15:00


Insulin Aspart (Novolog) 0-7 UNITS TIDWMEALS SQ  Last administered on 2/9/17at 

18:08;  Start 2/7/17 at 17:00


Dextrose 12.5 gm PRN Q15MIN  PRN IV SEE COMMENTS;  Start 2/7/17 at 15:00


Info 1 each 1 each PRN DAILY  PRN MC SEE COMMENTS Last administered on 2/9/17at 

09:38;  Start 2/7/17 at 17:00


Magnesium Sulfate/ Dextrose (Magnesium Sulfate PREMIX 2GM) 50 ml @ 25 mls/hr 

PRN DAILY  PRN IV for Mag < 1.7 on am labs;  Start 2/8/17 at 11:15


Lidocaine/Sodium Bicarbonate (Buffered Lidocaine 1%) 3 ml 1X  ONCE IJ  Last 

administered on 2/8/17at 14:03;  Start 2/8/17 at 12:30;  Stop 2/8/17 at 12:34;  

Status DC


Heparin Sodium (Porcine) 2,400 unit 1X  ONCE INT CAT  Last administered on 2/8/ 17at 14:04;  Start 2/8/17 at 12:30;  Stop 2/8/17 at 12:34;  Status DC


Heparin Sodium/ Sodium Chloride 60 unit 1X  ONCE IV  Last administered on 2/8/ 17at 14:04;  Start 2/8/17 at 12:30;  Stop 2/8/17 at 12:34;  Status DC


Lisinopril (Prinivil) 20 mg DAILY PO  Last administered on 2/9/17at 08:48;  

Start 2/8/17 at 16:00


Hydralazine HCl 50 mg 50 mg BID PO  Last administered on 2/9/17at 20:48;  Start 

2/8/17 at 21:00


Sodium Chloride (Iv Sodium Chloride 0.9% 1000ml Bag) 1,000 ml @  1,000 mls/hr 

Q1H PRN IV hypotension;  Start 2/8/17 at 18:28;  Stop 2/9/17 at 00:27;  Status 

DC


Diphenhydramine HCl (Benadryl) 25 mg 1X PRN  PRN IV ITCHING;  Start 2/8/17 at 18

:30;  Stop 2/9/17 at 18:29;  Status DC


Diphenhydramine HCl (Benadryl) 25 mg 1X PRN  PRN IV ITCHING;  Start 2/8/17 at 18

:30;  Stop 2/9/17 at 18:29;  Status DC


Sodium Chloride (Normal Saline Flush) 10 ml 1X PRN  PRN IV AP catheter pack;  

Start 2/8/17 at 18:30;  Stop 2/9/17 at 18:29;  Status DC


Sodium Chloride 10 ml 10 ml 1X PRN  PRN IV  catheter pack;  Start 2/8/17 at 18

:30;  Stop 2/9/17 at 18:29;  Status DC


Sodium Chloride (Iv Sodium Chloride 0.9% 1000ml Bag) 1,000 ml @  400 mls/hr 

Q2H30M PRN IV PATENCY;  Start 2/8/17 at 18:28;  Stop 2/9/17 at 06:27;  Status DC


Info (PHARMACY MONITORING -- do not chart) 1 each PRN DAILY  PRN MC SEE COMMENTS

;  Start 2/8/17 at 18:30


Info (PHARMACY MONITORING -- do not chart) 1 each PRN DAILY  PRN MC SEE COMMENTS

;  Start 2/9/17 at 10:30;  Status UNV


Info (PHARMACY MONITORING -- do not chart) 1 each PRN DAILY  PRN MC SEE COMMENTS

;  Start 2/9/17 at 10:30


Darbepoetin Valeriano (Aranesp) 60 mcg WEEKLYHS SQ  Last administered on 2/9/17at 20:

48;  Start 2/9/17 at 21:00


Clopidogrel Bisulfate (Plavix) 75 mg DAILYWBKFT PO ;  Start 2/10/17 at 08:00;  

Stop 2/10/17 at 08:00;  Status DC


Clopidogrel Bisulfate (Plavix) 75 mg DAILYWBKFT PO  Last administered on 2/9/ 17at 18:05;  Start 2/9/17 at 15:30





Active Scripts


Active


Lisinopril 20 Mg Tablet 20 Mg PO QHS


Isosorbide Mononitrate Er (Isosorbide Mononitrate) 30 Mg Tab.er.24h 30 Mg PO 

DAILY


Hydralazine Hcl 50 Mg Tablet 100 Mg PO TID


Carvedilol 12.5 Mg Tablet 25 Mg PO BIDWMEALS


Atorvastatin Calcium 40 Mg Tablet 80 Mg PO QHS


Aspirin Ec (Aspirin) 81 Mg Tablet.dr 81 Mg PO DAILYWBKFT


Amlodipine Besylate 5 Mg Tablet 5 Mg PO DAILYWLUN


Reported


Clopidogrel (Clopidogrel Bisulfate) 75 Mg Tablet 75 Mg PO DAILY


Glyburide 5 Mg Tablet 1 Tab PO BIDWMEALS


Lisinopril 20 Mg Tablet 1 Tab PO DAILY


Vitals/I & O





 Vital Sign - Last 24 Hours








 2/8/17 2/8/17 2/8/17 2/8/17





 22:00 22:15 23:00 23:59


 


Pulse 82 86 87 


 


Resp 19  21 


 


B/P 121/59 121/59 142/65 


 


Pulse Ox 95  95 


 


O2 Delivery Room Air  Room Air Room Air





 2/9/17 2/9/17 2/9/17 2/9/17





 00:00 01:00 02:00 03:00


 


Temp 98.6   





 98.6   


 


Pulse 90 86 90 89


 


Resp 22 20 20 20


 


B/P 151/67 146/61 139/55 145/61


 


Pulse Ox 96 92 92 91


 


O2 Delivery Room Air Room Air Room Air Room Air


 


    





    





 2/9/17 2/9/17 2/9/17 2/9/17





 04:00 04:00 05:00 06:00


 


Temp  98.8  





  98.8  


 


Pulse  88 82 80


 


Resp  23 24 27


 


B/P  159/66 125/53 125/58


 


Pulse Ox  91 94 92


 


O2 Delivery Room Air Room Air Room Air Room Air


 


    





    





 2/9/17 2/9/17 2/9/17 2/9/17





 07:00 08:00 08:00 08:48


 


Temp   100.2 





   100.2 


 


Pulse 88  98 98


 


Resp 21  22 


 


B/P 123/53  114/59 114/59


 


Pulse Ox 90  96 


 


O2 Delivery Room Air Room Air Room Air 


 


    





    





 2/9/17 2/9/17 2/9/17 2/9/17





 08:48 08:49 08:49 09:00


 


Pulse 92 98 98 94


 


Resp    22


 


B/P 114/59 114/59 114/59 128/54


 


Pulse Ox    97


 


O2 Delivery    Room Air





 2/9/17 2/9/17 2/9/17 2/9/17





 10:00 11:00 12:00 12:00


 


Temp    98.6





    98.6


 


Pulse 94 74  68


 


Resp 22 17  31


 


B/P 133/58 119/49  140/63


 


Pulse Ox 97 96  96


 


O2 Delivery Room Air Room Air Room Air Room Air


 


    





    





 2/9/17 2/9/17 2/9/17 2/9/17





 13:00 14:00 15:00 16:00


 


Temp    99.3





    99.3


 


Pulse 80 91 80 76


 


Resp 15 20 29 19


 


B/P 141/61 151/65 133/54 122/56


 


Pulse Ox 94 96 99 91


 


O2 Delivery Room Air Room Air Room Air Room Air


 


    





    





 2/9/17 2/9/17 2/9/17 2/9/17





 16:00 17:00 18:00 18:04


 


Pulse  76 80 76


 


Resp  19 31 


 


B/P  118/56 145/64 118/56


 


Pulse Ox  99 96 


 


O2 Delivery Room Air Room Air Room Air 





 2/9/17 2/9/17 2/9/17 2/9/17





 19:00 20:00 20:00 20:48


 


Temp  99.3  





  99.3  


 


Pulse 77 78  79


 


Resp 20 20  


 


B/P 113/50 116/53  116/53


 


Pulse Ox 95 93  


 


O2 Delivery Room Air Room Air Room Air 














 Intake and Output   


 


 2/8/17 2/8/17 2/9/17





 15:00 23:00 07:00


 


Intake Total  461.6 ml 269 ml


 


Output Total 300 ml 500 ml 400 ml


 


Balance -300 ml -38.4 ml -131 ml














OSMEL VALDEZ MD Feb 9, 2017 21:14

## 2017-02-09 NOTE — CONS
DATE OF CONSULTATION:  



PRIMARY PHYSICIAN:  Dr. David.



REASON FOR CONSULTATION:  CKD stage IV/V possible acute renal failure/ESRD.



HISTORY OF PRESENT ILLNESS:  The patient is a 59-year-old  female whose

daughter translates for me.  Her  is on dialysis.  The patient is known

to have CKD stage IV with baseline creatinine running approximately 3.4 on most

recent check.  Her creatinine clearance on a 24-hour urine was slightly above

20.  She had no uremic symptoms on her last check.  Now, she presents with all

of the following.  Inability to afford her medications and hence not taking,

severe hypertension, some chest pain, worsening renal function, positive

troponins, anorexia, dyscrasia.  Because of her spend down, she was unable to

afford the medications.  She is now admitted to the ICU.  Given her non-STEMI,

it was felt that she would need an emergent catheterization.  Clinically, she

improved and hence this was held off until her renal function and status could

be further verified.  Her EF did drop to 25% from previous.  She is known to

have previously abnormal coronary arteries, which were not amenable to

intervention at that time.  No pericardial effusion is noted.



After a long discussion, they are willing to proceed with initiation of dialysis

and arrangements for the same will be made.

 



______________________________

RENAN DODD MD



DR:  FARRAH/marcela  JOB#:  [id number] / [id number]

DD:  02/08/2017 11:18  DT:  02/09/2017 14:17

## 2017-02-09 NOTE — PDOC
Dialysis Progress Note


Dialysis Note


Dialysis Note


Seen on Hemodialysis, tolerating treatment        Okay     so far





Vitals on Hemodialysis:  125/54      68   16   afeb





 





 


General Appearance:          


Awake:          


Alert Oriented  x      2-3 


Neck:    No JVD or JVP


Chest:    CTA Roberto


Heart:    S1 S2


Abdomen -    Soft NTND


Extremities -    No Edema











ESRD:   Dialysis as below





F 180 NR  3.0  Hrs





3 K 2.5 Ca 140 Na  30    HC03





Qb 350 + Qd 500+





Heparin   on gtt Units





Uf none Kgs or to dry weight as tolerated





Pt refused Transfusion of PRCBC's





May give 25-50 gms of 25% Albumin or NS if needed to maintain Hemodynamic 

stability





Treatment plan reviewed and discussed with Dialysis RN





Vitals


Vital Signs





 Vital Signs








  Date Time  Temp Pulse Resp B/P Pulse Ox O2 Delivery O2 Flow Rate FiO2


 


2/9/17 11:00  74 17 119/49 96 Room Air  


 


2/9/17 08:00 100.2       





 100.2       


 


2/8/17 09:00       2.0 











Labs


Last Labs





Laboratory Tests








Test


  2/7/17


12:02 2/7/17


14:05 2/7/17


18:19 2/7/17


18:40


 


White Blood Count


  8.3x10^3/uL


(4.0-11.0) 


  


  


 


 


Red Blood Count


  2.69x10^6/uL


(3.50-5.40) 


  


  


 


 


Hemoglobin


  8.1g/dL


(12.0-15.5) 


  


  


 


 


Hematocrit


  25.1%


(36.0-47.0) 


  


  


 


 


Mean Corpuscular Volume 94fL ()    


 


Mean Corpuscular Hemoglobin 30pg (25-35)    


 


Mean Corpuscular Hemoglobin


Concent 32g/dL (31-37) 


  


  


  


 


 


Red Cell Distribution Width


  14.9%


(11.5-14.5) 


  


  


 


 


Platelet Count


  211x10^3/uL


(140-400) 


  


  


 


 


Neutrophils (%) (Auto) 73% (31-73)    


 


Lymphocytes (%) (Auto) 17% (24-48)    


 


Monocytes (%) (Auto) 7% (0-9)    


 


Eosinophils (%) (Auto) 3% (0-3)    


 


Basophils (%) (Auto) 1% (0-3)    


 


Neutrophils # (Auto)


  6.0x10^3uL


(1.8-7.7) 


  


  


 


 


Lymphocytes # (Auto)


  1.4x10^3/uL


(1.0-4.8) 


  


  


 


 


Monocytes # (Auto)


  0.6x10^3/uL


(0.0-1.1) 


  


  


 


 


Eosinophils # (Auto)


  0.2x10^3/uL


(0.0-0.7) 


  


  


 


 


Basophils # (Auto)


  0.1x10^3/uL


(0.0-0.2) 


  


  


 


 


Sodium Level


  141mmol/L


(136-145) 


  


  


 


 


Potassium Level


  4.9mmol/L


(3.5-5.1) 


  


  


 


 


Chloride Level


  111mmol/L


() 


  


  


 


 


Carbon Dioxide Level


  19mmol/L


(21-32) 


  


  


 


 


Anion Gap 11 (6-14)    


 


Blood Urea Nitrogen 49mg/dL (7-20)    


 


Creatinine


  3.9mg/dL


(0.6-1.0) 


  


  


 


 


Estimated GFR


(Cockcroft-Gault) 11.8 


  


  


  


 


 


BUN/Creatinine Ratio 13 (6-20)    


 


Glucose Level


  185mg/dL


(70-99) 


  


  


 


 


Calcium Level


  8.0mg/dL


(8.5-10.1) 


  


  


 


 


Total Bilirubin


  0.2mg/dL


(0.2-1.0) 


  


  


 


 


Aspartate Amino Transf


(AST/SGOT) 17U/L (15-37) 


  


  


  


 


 


Alanine Aminotransferase


(ALT/SGPT) 10U/L (14-59) 


  


  


  


 


 


Alkaline Phosphatase 90U/L ()    


 


Troponin I Quantitative


  2.073ng/mL


(0.000-0.055) 


  


  3.922ng/mL


(0.000-0.055)


 


NT-Pro-B-Type Natriuretic


Peptide 04033wk/mL


(0-124) 


  


  


 


 


Total Protein


  6.2g/dL


(6.4-8.2) 


  


  


 


 


Albumin


  2.6g/dL


(3.4-5.0) 


  


  


 


 


Albumin/Globulin Ratio 0.7 (1.0-1.7)    


 


Nasal Screen MRSA (PCR)


  


  Negative


(Negative) 


  


 


 


Glucose (Fingerstick)


  


  


  174mg/dL


(70-99) 


 














Test


  2/8/17


00:28 2/8/17


01:10 2/8/17


07:50 2/8/17


10:38


 


Troponin I Quantitative


  4.814ng/mL


(0.000-0.055) 


  


  


 


 


Heparin Anti-Xa Act,


Unfractionated 


  0.20IU/mL


(0.30-0.70) 0.30IU/mL


(0.30-0.70) 


 


 


White Blood Count


  


  


  6.6x10^3/uL


(4.0-11.0) 


 


 


Red Blood Count


  


  


  2.39x10^6/uL


(3.50-5.40) 


 


 


Hemoglobin


  


  


  7.3g/dL


(12.0-15.5) 


 


 


Hematocrit


  


  


  22.5%


(36.0-47.0) 


 


 


Mean Corpuscular Volume   94fL ()  


 


Mean Corpuscular Hemoglobin   31pg (25-35)  


 


Mean Corpuscular Hemoglobin


Concent 


  


  33g/dL (31-37) 


  


 


 


Red Cell Distribution Width


  


  


  15.3%


(11.5-14.5) 


 


 


Platelet Count


  


  


  189x10^3/uL


(140-400) 


 


 


Neutrophils (%) (Auto)   78% (31-73)  


 


Lymphocytes (%) (Auto)   11% (24-48)  


 


Monocytes (%) (Auto)   5% (0-9)  


 


Eosinophils (%) (Auto)   5% (0-3)  


 


Basophils (%) (Auto)   1% (0-3)  


 


Neutrophils # (Auto)


  


  


  5.1x10^3uL


(1.8-7.7) 


 


 


Lymphocytes # (Auto)


  


  


  0.7x10^3/uL


(1.0-4.8) 


 


 


Monocytes # (Auto)


  


  


  0.4x10^3/uL


(0.0-1.1) 


 


 


Eosinophils # (Auto)


  


  


  0.3x10^3/uL


(0.0-0.7) 


 


 


Basophils # (Auto)


  


  


  0.1x10^3/uL


(0.0-0.2) 


 


 


Sodium Level


  


  


  141mmol/L


(136-145) 


 


 


Potassium Level


  


  


  4.5mmol/L


(3.5-5.1) 


 


 


Chloride Level


  


  


  110mmol/L


() 


 


 


Carbon Dioxide Level


  


  


  20mmol/L


(21-32) 


 


 


Anion Gap   11 (6-14)  


 


Blood Urea Nitrogen   49mg/dL (7-20)  


 


Creatinine


  


  


  4.2mg/dL


(0.6-1.0) 


 


 


Estimated GFR


(Cockcroft-Gault) 


  


  10.8 


  


 


 


Glucose Level


  


  


  100mg/dL


(70-99) 


 


 


Calcium Level


  


  


  8.3mg/dL


(8.5-10.1) 


 


 


Triglycerides Level


  


  


  307mg/dL


(0-150) 


 


 


Cholesterol Level


  


  


  351mg/dL


(0-200) 


 


 


LDL Cholesterol, Calculated


  


  


  242mg/dL


(0-100) 


 


 


VLDL Cholesterol, Calculated   61mg/dL (0-40)  


 


HDL Cholesterol


  


  


  48mg/dL


(40-60) 


 


 


Cholesterol/HDL Ratio   7.3  


 


Glucose (Fingerstick)


  


  


  


  95mg/dL


(70-99)














Test


  2/8/17


11:40 2/8/17


14:12 2/8/17


15:05 2/9/17


06:24


 


Prothrombin Time


  13.5SEC


(11.7-14.0) 


  


  


 


 


Prothromb Time International


Ratio 1.1 (0.8-1.1) 


  


  


  


 


 


Activated Partial


Thromboplast Time 95SEC (24-38) 


  


  


  


 


 


Estimated GFR (Non-


American 11  (>59) 


  


  


  


 


 


EGFR  13  (>59)    


 


PTH (Intact) Specimen


Description Comment (.) 


  


  


  


 


 


Parathyroid Hormone (Intact)


  192pg/mL


(15-65) 


  


  


 


 


Calcium (PTH Intact)


  8.0mg/dL


(8.7-10.2) 


  


  


 


 


Creatinine (PTH Intact)


  4.14mg/dL


(0.57-1.00) 


  


  


 


 


Phosphorus (PTH Intact)


  5.4mg/dL


(2.5-4.5) 


  


  


 


 


Hepatitis A IgM Antibody


  Negative


(Negative) 


  


  


 


 


Hepatitis B Surface Antigen


  Negative


(Negative) 


  


  


 


 


Hepatitis B Surface Antibody


  Non reactive


(.) 


  


  


 


 


Hepatitis B Core Total


Antibody Negative


(Negative) 


  


  


 


 


Hepatitis B Core IgM Antibody


  Negative


(Negative) 


  


  


 


 


Hepatitis C Antibody


  <0.1s/co ratio


(0.0-0.9) 


  


  


 


 


Glucose (Fingerstick)


  


  103mg/dL


(70-99) 


  


 


 


Heparin Anti-Xa Act,


Unfractionated 


  


  0.38IU/mL


(0.30-0.70) 


 


 


White Blood Count


  


  


  


  6.4x10^3/uL


(4.0-11.0)


 


Red Blood Count


  


  


  


  2.31x10^6/uL


(3.50-5.40)


 


Hemoglobin


  


  


  


  7.0g/dL


(12.0-15.5)


 


Hematocrit


  


  


  


  21.1%


(36.0-47.0)


 


Mean Corpuscular Volume    91fL () 


 


Mean Corpuscular Hemoglobin    30pg (25-35) 


 


Mean Corpuscular Hemoglobin


Concent 


  


  


  33g/dL (31-37) 


 


 


Red Cell Distribution Width


  


  


  


  14.5%


(11.5-14.5)


 


Platelet Count


  


  


  


  169x10^3/uL


(140-400)


 


Neutrophils (%) (Auto)    73% (31-73) 


 


Lymphocytes (%) (Auto)    19% (24-48) 


 


Monocytes (%) (Auto)    6% (0-9) 


 


Eosinophils (%) (Auto)    2% (0-3) 


 


Basophils (%) (Auto)    0% (0-3) 


 


Neutrophils # (Auto)


  


  


  


  4.6x10^3uL


(1.8-7.7)


 


Lymphocytes # (Auto)


  


  


  


  1.2x10^3/uL


(1.0-4.8)


 


Monocytes # (Auto)


  


  


  


  0.4x10^3/uL


(0.0-1.1)


 


Eosinophils # (Auto)


  


  


  


  0.1x10^3/uL


(0.0-0.7)


 


Basophils # (Auto)


  


  


  


  0.0x10^3/uL


(0.0-0.2)


 


Sodium Level


  


  


  


  139mmol/L


(136-145)


 


Potassium Level


  


  


  


  3.7mmol/L


(3.5-5.1)


 


Chloride Level


  


  


  


  102mmol/L


()


 


Carbon Dioxide Level


  


  


  


  30mmol/L


(21-32)


 


Anion Gap    7 (6-14) 


 


Blood Urea Nitrogen    23mg/dL (7-20) 


 


Creatinine


  


  


  


  2.9mg/dL


(0.6-1.0)


 


Estimated GFR


(Cockcroft-Gault) 


  


  


  16.6 


 


 


Glucose Level


  


  


  


  164mg/dL


(70-99)


 


Calcium Level


  


  


  


  7.5mg/dL


(8.5-10.1)


 


Phosphorus Level


  


  


  


  3.0mg/dL


(2.6-4.7)


 


Magnesium Level


  


  


  


  1.8mg/dL


(1.8-2.4)


 


Albumin


  


  


  


  2.1g/dL


(3.4-5.0)














Test


  2/9/17


08:29 


  


  


 


 


Glucose (Fingerstick)


  139mg/dL


(70-99) 


  


  


 








Laboratory Tests








Test


  2/8/17


11:40 2/8/17


14:12 2/8/17


15:05 2/9/17


06:24


 


Prothrombin Time


  13.5SEC


(11.7-14.0) 


  


  


 


 


Prothromb Time International


Ratio 1.1 (0.8-1.1) 


  


  


  


 


 


Activated Partial


Thromboplast Time 95SEC (24-38) 


  


  


  


 


 


Estimated GFR (Non-


American 11  (>59) 


  


  


  


 


 


EGFR  13  (>59)    


 


PTH (Intact) Specimen


Description Comment (.) 


  


  


  


 


 


Parathyroid Hormone (Intact)


  192pg/mL


(15-65) 


  


  


 


 


Calcium (PTH Intact)


  8.0mg/dL


(8.7-10.2) 


  


  


 


 


Creatinine (PTH Intact)


  4.14mg/dL


(0.57-1.00) 


  


  


 


 


Phosphorus (PTH Intact)


  5.4mg/dL


(2.5-4.5) 


  


  


 


 


Hepatitis A IgM Antibody


  Negative


(Negative) 


  


  


 


 


Hepatitis B Surface Antigen


  Negative


(Negative) 


  


  


 


 


Hepatitis B Surface Antibody


  Non reactive


(.) 


  


  


 


 


Hepatitis B Core Total


Antibody Negative


(Negative) 


  


  


 


 


Hepatitis B Core IgM Antibody


  Negative


(Negative) 


  


  


 


 


Hepatitis C Antibody


  <0.1s/co ratio


(0.0-0.9) 


  


  


 


 


Glucose (Fingerstick)


  


  103mg/dL


(70-99) 


  


 


 


Heparin Anti-Xa Act,


Unfractionated 


  


  0.38IU/mL


(0.30-0.70) 


 


 


White Blood Count


  


  


  


  6.4x10^3/uL


(4.0-11.0)


 


Red Blood Count


  


  


  


  2.31x10^6/uL


(3.50-5.40)


 


Hemoglobin


  


  


  


  7.0g/dL


(12.0-15.5)


 


Hematocrit


  


  


  


  21.1%


(36.0-47.0)


 


Mean Corpuscular Volume    91fL () 


 


Mean Corpuscular Hemoglobin    30pg (25-35) 


 


Mean Corpuscular Hemoglobin


Concent 


  


  


  33g/dL (31-37) 


 


 


Red Cell Distribution Width


  


  


  


  14.5%


(11.5-14.5)


 


Platelet Count


  


  


  


  169x10^3/uL


(140-400)


 


Neutrophils (%) (Auto)    73% (31-73) 


 


Lymphocytes (%) (Auto)    19% (24-48) 


 


Monocytes (%) (Auto)    6% (0-9) 


 


Eosinophils (%) (Auto)    2% (0-3) 


 


Basophils (%) (Auto)    0% (0-3) 


 


Neutrophils # (Auto)


  


  


  


  4.6x10^3uL


(1.8-7.7)


 


Lymphocytes # (Auto)


  


  


  


  1.2x10^3/uL


(1.0-4.8)


 


Monocytes # (Auto)


  


  


  


  0.4x10^3/uL


(0.0-1.1)


 


Eosinophils # (Auto)


  


  


  


  0.1x10^3/uL


(0.0-0.7)


 


Basophils # (Auto)


  


  


  


  0.0x10^3/uL


(0.0-0.2)


 


Sodium Level


  


  


  


  139mmol/L


(136-145)


 


Potassium Level


  


  


  


  3.7mmol/L


(3.5-5.1)


 


Chloride Level


  


  


  


  102mmol/L


()


 


Carbon Dioxide Level


  


  


  


  30mmol/L


(21-32)


 


Anion Gap    7 (6-14) 


 


Blood Urea Nitrogen    23mg/dL (7-20) 


 


Creatinine


  


  


  


  2.9mg/dL


(0.6-1.0)


 


Estimated GFR


(Cockcroft-Gault) 


  


  


  16.6 


 


 


Glucose Level


  


  


  


  164mg/dL


(70-99)


 


Calcium Level


  


  


  


  7.5mg/dL


(8.5-10.1)


 


Phosphorus Level


  


  


  


  3.0mg/dL


(2.6-4.7)


 


Magnesium Level


  


  


  


  1.8mg/dL


(1.8-2.4)


 


Albumin


  


  


  


  2.1g/dL


(3.4-5.0)














Test


  2/9/17


08:29 


  


  


 


 


Glucose (Fingerstick)


  139mg/dL


(70-99) 


  


  


 











Assessment


Assessment


 Problems


Medical Problems:


(1) Congestive heart failure


Status: Acute  





(2) NSTEMI (non-ST elevated myocardial infarction)


Status: Acute  





Problems:  





Plan


Plan of Care


 Problems


Medical Problems:


(1) Congestive heart failure


Status: Acute  





(2) NSTEMI (non-ST elevated myocardial infarction)


Status: Acute  














RENAN DODD MD Feb 9, 2017 11:41

## 2017-02-10 NOTE — PDOC
PROGRESS NOTES


Chief Complaint


Chief Complaint


cc: sob 





A//P


1.  Acute on chronic systolic congestive heart failure.


2.  Non-ST-segment elevation myocardial infarction.


3.  WALTER


4.  Ischemic cardiomyopathy.


5.  Malignant hypertension, Better


6.  Anemia


7.  Hyperlipidemia.


8.  Psoriasis.


9. CAD


10. Anemia





Plan 


Temp HD catheter


HD nephrology 


BP controlled, cardiology following 


Intake and out put. 


Cardiac cath per cardiology 


SSI 


Lipitor 


labs reviewed 


Monitor hemoglobin 


Prognosis guarded. 


family at bedside, all questions answered.





History of Present Illness


History of Present Illness


sob better


no fever


no chills. 


no chest pain





Vitals


Vitals





 Vital Signs








  Date Time  Temp Pulse Resp B/P Pulse Ox O2 Delivery O2 Flow Rate FiO2


 


2/10/17 06:00  74 19 144/66 92 Room Air  


 


2/10/17 04:00 99.9       





 99.9       











Physical Exam


General:  Alert, Oriented X3, Cooperative, moderate distress


Heart:  Normal S1, Normal S2, Other (tele: ST, 2/6 systolci murmur )


Lungs:  Clear, Other


Abdomen:  Soft, No tenderness


Extremities:  No cyanosis, No edema, Normal pulses


Skin:  No breakdown, No significant lesion, Other (psoriasis )





Labs


LABS





Laboratory Tests








Test


  2/9/17


13:46 2/9/17


17:59 2/10/17


06:00


 


Glucose (Fingerstick)


  99mg/dL


(70-99) 170mg/dL


(70-99) 


 


 


White Blood Count


  


  


  5.9x10^3/uL


(4.0-11.0)


 


Red Blood Count


  


  


  2.29x10^6/uL


(3.50-5.40)


 


Hemoglobin


  


  


  7.0g/dL


(12.0-15.5)


 


Hematocrit


  


  


  21.4%


(36.0-47.0)


 


Mean Corpuscular Volume   93fL () 


 


Mean Corpuscular Hemoglobin   31pg (25-35) 


 


Mean Corpuscular Hemoglobin


Concent 


  


  33g/dL (31-37) 


 


 


Red Cell Distribution Width


  


  


  15.0%


(11.5-14.5)


 


Platelet Count


  


  


  157x10^3/uL


(140-400)


 


Neutrophils (%) (Auto)   62% (31-73) 


 


Lymphocytes (%) (Auto)   22% (24-48) 


 


Monocytes (%) (Auto)   12% (0-9) 


 


Eosinophils (%) (Auto)   3% (0-3) 


 


Basophils (%) (Auto)   1% (0-3) 


 


Neutrophils # (Auto)


  


  


  3.7x10^3uL


(1.8-7.7)


 


Lymphocytes # (Auto)


  


  


  1.3x10^3/uL


(1.0-4.8)


 


Monocytes # (Auto)


  


  


  0.7x10^3/uL


(0.0-1.1)


 


Eosinophils # (Auto)


  


  


  0.2x10^3/uL


(0.0-0.7)


 


Basophils # (Auto)


  


  


  0.0x10^3/uL


(0.0-0.2)


 


Sodium Level


  


  


  140mmol/L


(136-145)


 


Potassium Level


  


  


  4.0mmol/L


(3.5-5.1)


 


Chloride Level


  


  


  104mmol/L


()


 


Carbon Dioxide Level


  


  


  29mmol/L


(21-32)


 


Anion Gap   7 (6-14) 


 


Blood Urea Nitrogen   19mg/dL (7-20) 


 


Creatinine


  


  


  3.1mg/dL


(0.6-1.0)


 


Estimated GFR


(Cockcroft-Gault) 


  


  15.4 


 


 


Glucose Level


  


  


  147mg/dL


(70-99)


 


Calcium Level


  


  


  7.8mg/dL


(8.5-10.1)


 


Phosphorus Level


  


  


  3.5mg/dL


(2.6-4.7)


 


Magnesium Level


  


  


  1.7mg/dL


(1.8-2.4)


 


Albumin


  


  


  2.1g/dL


(3.4-5.0)











Assessment and Plan


Assessmemt and Plan


 Problems


Medical Problems:


(1) Congestive heart failure


Status: Acute  





(2) NSTEMI (non-ST elevated myocardial infarction)


Status: Acute  








Problems:  





Comment


Review of Relevant


I have reviewed the following items lore (where applicable) has been applied.


Labs





Laboratory Tests








Test


  2/8/17


10:38 2/8/17


11:40 2/8/17


14:12 2/8/17


15:05


 


Glucose (Fingerstick)


  95mg/dL


(70-99) 


  103mg/dL


(70-99) 


 


 


Prothrombin Time


  


  13.5SEC


(11.7-14.0) 


  


 


 


Prothromb Time International


Ratio 


  1.1 (0.8-1.1) 


  


  


 


 


Activated Partial


Thromboplast Time 


  95SEC (24-38) 


  


  


 


 


Estimated GFR (Non-


American 


  11  (>59) 


  


  


 


 


EGFR   13  (>59)   


 


PTH (Intact) Specimen


Description 


  Comment (.) 


  


  


 


 


Parathyroid Hormone (Intact)


  


  192pg/mL


(15-65) 


  


 


 


Calcium (PTH Intact)


  


  8.0mg/dL


(8.7-10.2) 


  


 


 


Creatinine (PTH Intact)


  


  4.14mg/dL


(0.57-1.00) 


  


 


 


Phosphorus (PTH Intact)


  


  5.4mg/dL


(2.5-4.5) 


  


 


 


Hepatitis A IgM Antibody


  


  Negative


(Negative) 


  


 


 


Hepatitis B Surface Antigen


  


  Negative


(Negative) 


  


 


 


Hepatitis B Surface Antibody


  


  Non reactive


(.) 


  


 


 


Hepatitis B Core Total


Antibody 


  Negative


(Negative) 


  


 


 


Hepatitis B Core IgM Antibody


  


  Negative


(Negative) 


  


 


 


Hepatitis C Antibody


  


  <0.1s/co ratio


(0.0-0.9) 


  


 


 


Heparin Anti-Xa Act,


Unfractionated 


  


  


  0.38IU/mL


(0.30-0.70)














Test


  2/8/17


16:37 2/9/17


06:24 2/9/17


08:29 2/9/17


13:46


 


Glucose (Fingerstick)


  182mg/dL


(70-99) 


  139mg/dL


(70-99) 99mg/dL


(70-99)


 


White Blood Count


  


  6.4x10^3/uL


(4.0-11.0) 


  


 


 


Red Blood Count


  


  2.31x10^6/uL


(3.50-5.40) 


  


 


 


Hemoglobin


  


  7.0g/dL


(12.0-15.5) 


  


 


 


Hematocrit


  


  21.1%


(36.0-47.0) 


  


 


 


Mean Corpuscular Volume  91fL ()   


 


Mean Corpuscular Hemoglobin  30pg (25-35)   


 


Mean Corpuscular Hemoglobin


Concent 


  33g/dL (31-37) 


  


  


 


 


Red Cell Distribution Width


  


  14.5%


(11.5-14.5) 


  


 


 


Platelet Count


  


  169x10^3/uL


(140-400) 


  


 


 


Neutrophils (%) (Auto)  73% (31-73)   


 


Lymphocytes (%) (Auto)  19% (24-48)   


 


Monocytes (%) (Auto)  6% (0-9)   


 


Eosinophils (%) (Auto)  2% (0-3)   


 


Basophils (%) (Auto)  0% (0-3)   


 


Neutrophils # (Auto)


  


  4.6x10^3uL


(1.8-7.7) 


  


 


 


Lymphocytes # (Auto)


  


  1.2x10^3/uL


(1.0-4.8) 


  


 


 


Monocytes # (Auto)


  


  0.4x10^3/uL


(0.0-1.1) 


  


 


 


Eosinophils # (Auto)


  


  0.1x10^3/uL


(0.0-0.7) 


  


 


 


Basophils # (Auto)


  


  0.0x10^3/uL


(0.0-0.2) 


  


 


 


Reticulocyte Count (auto)  1.9% (0.5-2.5)   


 


Sodium Level


  


  139mmol/L


(136-145) 


  


 


 


Potassium Level


  


  3.7mmol/L


(3.5-5.1) 


  


 


 


Chloride Level


  


  102mmol/L


() 


  


 


 


Carbon Dioxide Level


  


  30mmol/L


(21-32) 


  


 


 


Anion Gap  7 (6-14)   


 


Blood Urea Nitrogen  23mg/dL (7-20)   


 


Creatinine


  


  2.9mg/dL


(0.6-1.0) 


  


 


 


Estimated GFR


(Cockcroft-Gault) 


  16.6 


  


  


 


 


Glucose Level


  


  164mg/dL


(70-99) 


  


 


 


Calcium Level


  


  7.5mg/dL


(8.5-10.1) 


  


 


 


Phosphorus Level


  


  3.0mg/dL


(2.6-4.7) 


  


 


 


Magnesium Level


  


  1.8mg/dL


(1.8-2.4) 


  


 


 


Iron Level


  


  26ug/dL


() 


  


 


 


Total Iron Binding Capacity


  


  237ug/dL


(250-450) 


  


 


 


Iron Saturation  11% (15-34)   


 


Ferritin


  


  62ng/mL


(8-252) 


  


 


 


Albumin


  


  2.1g/dL


(3.4-5.0) 


  


 


 


Vitamin B12 Level


  


  286pg/mL


(211-946) 


  


 














Test


  2/9/17


17:59 2/10/17


06:00 


  


 


 


Glucose (Fingerstick)


  170mg/dL


(70-99) 


  


  


 


 


White Blood Count


  


  5.9x10^3/uL


(4.0-11.0) 


  


 


 


Red Blood Count


  


  2.29x10^6/uL


(3.50-5.40) 


  


 


 


Hemoglobin


  


  7.0g/dL


(12.0-15.5) 


  


 


 


Hematocrit


  


  21.4%


(36.0-47.0) 


  


 


 


Mean Corpuscular Volume  93fL ()   


 


Mean Corpuscular Hemoglobin  31pg (25-35)   


 


Mean Corpuscular Hemoglobin


Concent 


  33g/dL (31-37) 


  


  


 


 


Red Cell Distribution Width


  


  15.0%


(11.5-14.5) 


  


 


 


Platelet Count


  


  157x10^3/uL


(140-400) 


  


 


 


Neutrophils (%) (Auto)  62% (31-73)   


 


Lymphocytes (%) (Auto)  22% (24-48)   


 


Monocytes (%) (Auto)  12% (0-9)   


 


Eosinophils (%) (Auto)  3% (0-3)   


 


Basophils (%) (Auto)  1% (0-3)   


 


Neutrophils # (Auto)


  


  3.7x10^3uL


(1.8-7.7) 


  


 


 


Lymphocytes # (Auto)


  


  1.3x10^3/uL


(1.0-4.8) 


  


 


 


Monocytes # (Auto)


  


  0.7x10^3/uL


(0.0-1.1) 


  


 


 


Eosinophils # (Auto)


  


  0.2x10^3/uL


(0.0-0.7) 


  


 


 


Basophils # (Auto)


  


  0.0x10^3/uL


(0.0-0.2) 


  


 


 


Sodium Level


  


  140mmol/L


(136-145) 


  


 


 


Potassium Level


  


  4.0mmol/L


(3.5-5.1) 


  


 


 


Chloride Level


  


  104mmol/L


() 


  


 


 


Carbon Dioxide Level


  


  29mmol/L


(21-32) 


  


 


 


Anion Gap  7 (6-14)   


 


Blood Urea Nitrogen  19mg/dL (7-20)   


 


Creatinine


  


  3.1mg/dL


(0.6-1.0) 


  


 


 


Estimated GFR


(Cockcroft-Gault) 


  15.4 


  


  


 


 


Glucose Level


  


  147mg/dL


(70-99) 


  


 


 


Calcium Level


  


  7.8mg/dL


(8.5-10.1) 


  


 


 


Phosphorus Level


  


  3.5mg/dL


(2.6-4.7) 


  


 


 


Magnesium Level


  


  1.7mg/dL


(1.8-2.4) 


  


 


 


Albumin


  


  2.1g/dL


(3.4-5.0) 


  


 








Laboratory Tests








Test


  2/9/17


13:46 2/9/17


17:59 2/10/17


06:00


 


Glucose (Fingerstick)


  99mg/dL


(70-99) 170mg/dL


(70-99) 


 


 


White Blood Count


  


  


  5.9x10^3/uL


(4.0-11.0)


 


Red Blood Count


  


  


  2.29x10^6/uL


(3.50-5.40)


 


Hemoglobin


  


  


  7.0g/dL


(12.0-15.5)


 


Hematocrit


  


  


  21.4%


(36.0-47.0)


 


Mean Corpuscular Volume   93fL () 


 


Mean Corpuscular Hemoglobin   31pg (25-35) 


 


Mean Corpuscular Hemoglobin


Concent 


  


  33g/dL (31-37) 


 


 


Red Cell Distribution Width


  


  


  15.0%


(11.5-14.5)


 


Platelet Count


  


  


  157x10^3/uL


(140-400)


 


Neutrophils (%) (Auto)   62% (31-73) 


 


Lymphocytes (%) (Auto)   22% (24-48) 


 


Monocytes (%) (Auto)   12% (0-9) 


 


Eosinophils (%) (Auto)   3% (0-3) 


 


Basophils (%) (Auto)   1% (0-3) 


 


Neutrophils # (Auto)


  


  


  3.7x10^3uL


(1.8-7.7)


 


Lymphocytes # (Auto)


  


  


  1.3x10^3/uL


(1.0-4.8)


 


Monocytes # (Auto)


  


  


  0.7x10^3/uL


(0.0-1.1)


 


Eosinophils # (Auto)


  


  


  0.2x10^3/uL


(0.0-0.7)


 


Basophils # (Auto)


  


  


  0.0x10^3/uL


(0.0-0.2)


 


Sodium Level


  


  


  140mmol/L


(136-145)


 


Potassium Level


  


  


  4.0mmol/L


(3.5-5.1)


 


Chloride Level


  


  


  104mmol/L


()


 


Carbon Dioxide Level


  


  


  29mmol/L


(21-32)


 


Anion Gap   7 (6-14) 


 


Blood Urea Nitrogen   19mg/dL (7-20) 


 


Creatinine


  


  


  3.1mg/dL


(0.6-1.0)


 


Estimated GFR


(Cockcroft-Gault) 


  


  15.4 


 


 


Glucose Level


  


  


  147mg/dL


(70-99)


 


Calcium Level


  


  


  7.8mg/dL


(8.5-10.1)


 


Phosphorus Level


  


  


  3.5mg/dL


(2.6-4.7)


 


Magnesium Level


  


  


  1.7mg/dL


(1.8-2.4)


 


Albumin


  


  


  2.1g/dL


(3.4-5.0)








Medications





 Current Medications


Aspirin 324 mg 324 mg 1X  ONCE PO  Last administered on 2/7/17at 12:59;  Start 2 /7/17 at 12:45;  Stop 2/7/17 at 12:48;  Status DC


Heparin Sodium/ Dextrose 500 ml @  19 mls/hr CONT  PRN IV SEE I/O RECORD Last 

administered on 2/8/17at 14:45;  Start 2/7/17 at 12:45;  Stop 2/9/17 at 15:30;  

Status DC


Heparin Sodium (Porcine) 1,950 unit PRN Q6HRS  PRN IV FOR UFH LEVEL LESS THAN 

0.2 Last administered on 2/7/17at 13:01;  Start 2/7/17 at 12:45;  Stop 2/9/17 

at 15:30;  Status DC


Ondansetron HCl (Zofran) 4 mg PRN Q8HRS  PRN IV NAUSEA/VOMITING;  Start 2/7/17 

at 12:45;  Stop 2/8/17 at 12:44;  Status DC


Morphine Sulfate 4 mg PRN Q2HR  PRN IV PAIN Last administered on 2/7/17at 14:03

;  Start 2/7/17 at 12:45;  Stop 2/8/17 at 12:44;  Status DC


Nitroglycerin (Nitrostat) 0.4 mg PRN Q5MIN  PRN SL CHEST PAIN Last administered 

on 2/7/17at 14:03;  Start 2/7/17 at 12:45;  Stop 2/8/17 at 12:44;  Status DC


Amlodipine Besylate (Norvasc) 5 mg DAILYWLUN PO  Last administered on 2/8/17at 

08:32;  Start 2/7/17 at 14:00;  Stop 2/8/17 at 16:02;  Status DC


Aspirin (Ecotrin) 81 mg DAILYWBKFT PO  Last administered on 2/10/17at 08:25;  

Start 2/7/17 at 14:00


Atorvastatin Calcium (Lipitor) 80 mg QHS PO  Last administered on 2/9/17at 20:48

;  Start 2/7/17 at 21:00


Carvedilol (Coreg) 25 mg BIDWMEALS PO  Last administered on 2/9/17at 18:04;  

Start 2/7/17 at 17:00


Clopidogrel Bisulfate (Plavix) 75 mg DAILY PO  Last administered on 2/7/17at 14:

28;  Start 2/7/17 at 14:00;  Stop 2/8/17 at 08:41;  Status DC


Isosorbide Mononitrate (Imdur) 30 mg DAILY PO  Last administered on 2/9/17at 08:

49;  Start 2/7/17 at 14:00


Furosemide (Lasix) 40 mg 1X  ONCE IVP  Last administered on 2/7/17at 14:30;  

Start 2/7/17 at 14:00;  Stop 2/7/17 at 14:23;  Status DC


Furosemide 40 mg 40 mg 1X  ONCE IVP  Last administered on 2/7/17at 14:30;  

Start 2/7/17 at 14:30;  Stop 2/7/17 at 14:38;  Status DC


Nitroglycerin/ Dextrose (Nitroglycerin Drip) 250 ml @ 0 mls/hr CONT  PRN IV SEE 

I/O RECORD Last administered on 2/7/17at 15:23;  Start 2/7/17 at 14:30;  Stop 2/ 8/17 at 16:02;  Status DC


Acetaminophen (Tylenol) 325 mg PRN Q6HRS  PRN PO MILD PAIN / TEMP;  Start 2/7/ 17 at 15:00


Acetaminophen/ Hydrocodone Bitart (Lortab 5/325) 1 tab PRN Q6HRS  PRN PO 

MODERATE TO SEVERE PAIN;  Start 2/7/17 at 15:00


Hydralazine HCl (Apresoline) 10 mg PRN Q4HRS  PRN IVP ELEVATED BP, SEE COMMENTS

;  Start 2/7/17 at 15:00


Ondansetron HCl (Zofran) 4 mg PRN Q8HRS  PRN IV NAUSEA/VOMITING;  Start 2/7/17 

at 15:00


Albuterol Sulfate (Ventolin Neb Soln) 2.5 mg PRN Q4HRS  PRN NEB SHORTNESS OF 

BREATH;  Start 2/7/17 at 15:00


Insulin Aspart (Novolog) 0-7 UNITS TIDWMEALS SQ  Last administered on 2/9/17at 

18:08;  Start 2/7/17 at 17:00


Dextrose 12.5 gm PRN Q15MIN  PRN IV SEE COMMENTS;  Start 2/7/17 at 15:00


Info 1 each 1 each PRN DAILY  PRN MC SEE COMMENTS Last administered on 2/9/17at 

09:38;  Start 2/7/17 at 17:00;  Stop 2/10/17 at 08:25;  Status DC


Magnesium Sulfate/ Dextrose (Magnesium Sulfate PREMIX 2GM) 50 ml @ 25 mls/hr 

PRN DAILY  PRN IV for Mag < 1.7 on am labs;  Start 2/8/17 at 11:15


Lidocaine/Sodium Bicarbonate (Buffered Lidocaine 1%) 3 ml 1X  ONCE IJ  Last 

administered on 2/8/17at 14:03;  Start 2/8/17 at 12:30;  Stop 2/8/17 at 12:34;  

Status DC


Heparin Sodium (Porcine) 2,400 unit 1X  ONCE INT CAT  Last administered on 2/8/ 17at 14:04;  Start 2/8/17 at 12:30;  Stop 2/8/17 at 12:34;  Status DC


Heparin Sodium/ Sodium Chloride 60 unit 1X  ONCE IV  Last administered on 2/8/ 17at 14:04;  Start 2/8/17 at 12:30;  Stop 2/8/17 at 12:34;  Status DC


Lisinopril (Prinivil) 20 mg DAILY PO  Last administered on 2/9/17at 08:48;  

Start 2/8/17 at 16:00


Hydralazine HCl 50 mg 50 mg BID PO  Last administered on 2/9/17at 20:48;  Start 

2/8/17 at 21:00


Sodium Chloride (Iv Sodium Chloride 0.9% 1000ml Bag) 1,000 ml @  1,000 mls/hr 

Q1H PRN IV hypotension;  Start 2/8/17 at 18:28;  Stop 2/9/17 at 00:27;  Status 

DC


Diphenhydramine HCl (Benadryl) 25 mg 1X PRN  PRN IV ITCHING;  Start 2/8/17 at 18

:30;  Stop 2/9/17 at 18:29;  Status DC


Diphenhydramine HCl (Benadryl) 25 mg 1X PRN  PRN IV ITCHING;  Start 2/8/17 at 18

:30;  Stop 2/9/17 at 18:29;  Status DC


Sodium Chloride (Normal Saline Flush) 10 ml 1X PRN  PRN IV AP catheter pack;  

Start 2/8/17 at 18:30;  Stop 2/9/17 at 18:29;  Status DC


Sodium Chloride 10 ml 10 ml 1X PRN  PRN IV  catheter pack;  Start 2/8/17 at 18

:30;  Stop 2/9/17 at 18:29;  Status DC


Sodium Chloride (Iv Sodium Chloride 0.9% 1000ml Bag) 1,000 ml @  400 mls/hr 

Q2H30M PRN IV PATENCY;  Start 2/8/17 at 18:28;  Stop 2/9/17 at 06:27;  Status DC


Info (PHARMACY MONITORING -- do not chart) 1 each PRN DAILY  PRN MC SEE COMMENTS

;  Start 2/8/17 at 18:30


Info (PHARMACY MONITORING -- do not chart) 1 each PRN DAILY  PRN MC SEE COMMENTS

;  Start 2/9/17 at 10:30;  Status UNV


Info (PHARMACY MONITORING -- do not chart) 1 each PRN DAILY  PRN MC SEE COMMENTS

;  Start 2/9/17 at 10:30;  Status Cancel


Darbepoetin Valeriano (Aranesp) 60 mcg WEEKLYHS SQ  Last administered on 2/9/17at 20:

48;  Start 2/9/17 at 21:00


Clopidogrel Bisulfate (Plavix) 75 mg DAILYWBKFT PO ;  Start 2/10/17 at 08:00;  

Stop 2/10/17 at 08:00;  Status DC


Clopidogrel Bisulfate 75 mg 75 mg DAILYWBKFT PO  Last administered on 2/10/17at 

08:25;  Start 2/9/17 at 15:30


Sodium Chloride (Iv Sodium Chloride 0.9% 1000ml Bag) 1,000 ml @  1,000 mls/hr 

Q1H PRN IV hypotension;  Start 2/10/17 at 08:11;  Stop 2/10/17 at 15:00


Diphenhydramine HCl (Benadryl) 25 mg 1X PRN  PRN IV ITCHING;  Start 2/10/17 at 

08:15;  Stop 2/10/17 at 15:00


Diphenhydramine HCl (Benadryl) 25 mg 1X PRN  PRN IV ITCHING;  Start 2/10/17 at 

08:15;  Stop 2/10/17 at 15:00


Sodium Chloride (Normal Saline Flush) 10 ml 1X PRN  PRN IV AP catheter pack;  

Start 2/10/17 at 08:15;  Stop 2/10/17 at 15:00


Sodium Chloride 10 ml 10 ml 1X PRN  PRN IV  catheter pack;  Start 2/10/17 at 

08:15;  Stop 2/10/17 at 15:00


Sodium Chloride (Iv Sodium Chloride 0.9% 1000ml Bag) 1,000 ml @  400 mls/hr 

Q2H30M PRN IV PATENCY;  Start 2/10/17 at 08:11;  Stop 2/10/17 at 21:00


Info (PHARMACY MONITORING -- do not chart) 1 each PRN DAILY  PRN MC SEE COMMENTS

;  Start 2/10/17 at 08:15;  Status UNV





Active Scripts


Active


Lisinopril 20 Mg Tablet 20 Mg PO QHS


Isosorbide Mononitrate Er (Isosorbide Mononitrate) 30 Mg Tab.er.24h 30 Mg PO 

DAILY


Hydralazine Hcl 50 Mg Tablet 100 Mg PO TID


Carvedilol 12.5 Mg Tablet 25 Mg PO BIDWMEALS


Atorvastatin Calcium 40 Mg Tablet 80 Mg PO QHS


Aspirin Ec (Aspirin) 81 Mg Tablet.dr 81 Mg PO DAILYWBKFT


Amlodipine Besylate 5 Mg Tablet 5 Mg PO DAILYWLUN


Reported


Clopidogrel (Clopidogrel Bisulfate) 75 Mg Tablet 75 Mg PO DAILY


Glyburide 5 Mg Tablet 1 Tab PO BIDWMEALS


Lisinopril 20 Mg Tablet 1 Tab PO DAILY


Vitals/I & O





 Vital Sign - Last 24 Hours








 2/9/17 2/9/17 2/9/17 2/9/17





 11:00 12:00 12:00 13:00


 


Temp   98.6 





   98.6 


 


Pulse 74  68 80


 


Resp 17  31 15


 


B/P 119/49  140/63 141/61


 


Pulse Ox 96  96 94


 


O2 Delivery Room Air Room Air Room Air Room Air


 


    





    





 2/9/17 2/9/17 2/9/17 2/9/17





 14:00 15:00 16:00 16:00


 


Temp   99.3 





   99.3 


 


Pulse 91 80 76 


 


Resp 20 29 19 


 


B/P 151/65 133/54 122/56 


 


Pulse Ox 96 99 91 


 


O2 Delivery Room Air Room Air Room Air Room Air


 


    





    





 2/9/17 2/9/17 2/9/17 2/9/17





 17:00 18:00 18:04 19:00


 


Pulse 76 80 76 77


 


Resp 19 31  20


 


B/P 118/56 145/64 118/56 113/50


 


Pulse Ox 99 96  95


 


O2 Delivery Room Air Room Air  Room Air





 2/9/17 2/9/17 2/9/17 2/9/17





 20:00 20:00 20:48 21:00


 


Temp 99.3   





 99.3   


 


Pulse 78  79 78


 


Resp 20   21


 


B/P 116/53  116/53 121/57


 


Pulse Ox 93   92


 


O2 Delivery Room Air Room Air  Room Air


 


    





    





 2/9/17 2/9/17 2/9/17 2/10/17





 22:00 23:00 23:59 00:00


 


Temp    99.5





    99.5


 


Pulse 74 81  74


 


Resp 26 18  16


 


B/P 135/60 125/57  133/59


 


Pulse Ox 95 96  95


 


O2 Delivery Room Air Room Air Room Air Room Air


 


    





    





 2/10/17 2/10/17 2/10/17 2/10/17





 01:00 02:00 03:00 03:49


 


Pulse 75 82 76 


 


Resp 19 30 22 


 


B/P 140/64 132/61 134/58 


 


Pulse Ox 94 92 93 


 


O2 Delivery Room Air Room Air Room Air Room Air





 2/10/17 2/10/17 2/10/17 





 04:00 05:00 06:00 


 


Temp 99.9   





 99.9   


 


Pulse 75 71 74 


 


Resp 16 15 19 


 


B/P 138/63 139/61 144/66 


 


Pulse Ox 92 96 92 


 


O2 Delivery Room Air Room Air Room Air 














 Intake and Output   


 


 2/9/17 2/9/17 2/10/17





 15:00 23:00 07:00


 


Intake Total 885 ml 460 ml 0 ml


 


Output Total 100 ml 100 ml 175 ml


 


Balance 785 ml 360 ml -175 ml














OSMEL VALDEZ MD Feb 10, 2017 10:27

## 2017-02-10 NOTE — PDOC
WENCESLAO FRASER APRN 2/10/17 1416:


CARDIO Progress Notes


Date and Time


Date of Service


2/10/17


Time of Evaluation


1050





Subjective


Subjective:  No Chest Pain, No shortness of breath, No Palpitations, No 

Dizziness, Other (tired. on HD)





Vitals


Vitals





 Vital Signs








  Date Time  Temp Pulse Resp B/P Pulse Ox O2 Delivery O2 Flow Rate FiO2


 


2/10/17 12:50  74  164/68    


 


2/10/17 12:00 99.6  21  98 Room Air  





 99.6       








Weight


Weight [ ]





Input and Output


Intake and Output











 Intake and Output 


 


 2/10/17





 07:00


 


Intake Total 1345 ml


 


Output Total 375 ml


 


Balance 970 ml


 


 


 


Intake Oral 1345 ml


 


Output Urine Total 375 ml


 


# Bowel Movements 1











Laboratory


Labs





Laboratory Tests








Test


  2/9/17


17:59 2/10/17


06:00 2/10/17


11:58


 


Glucose (Fingerstick)


  170mg/dL


(70-99) 


  96mg/dL


(70-99)


 


White Blood Count


  


  5.9x10^3/uL


(4.0-11.0) 


 


 


Red Blood Count


  


  2.29x10^6/uL


(3.50-5.40) 


 


 


Hemoglobin


  


  7.0g/dL


(12.0-15.5) 


 


 


Hematocrit


  


  21.4%


(36.0-47.0) 


 


 


Mean Corpuscular Volume  93fL ()  


 


Mean Corpuscular Hemoglobin  31pg (25-35)  


 


Mean Corpuscular Hemoglobin


Concent 


  33g/dL (31-37) 


  


 


 


Red Cell Distribution Width


  


  15.0%


(11.5-14.5) 


 


 


Platelet Count


  


  157x10^3/uL


(140-400) 


 


 


Neutrophils (%) (Auto)  62% (31-73)  


 


Lymphocytes (%) (Auto)  22% (24-48)  


 


Monocytes (%) (Auto)  12% (0-9)  


 


Eosinophils (%) (Auto)  3% (0-3)  


 


Basophils (%) (Auto)  1% (0-3)  


 


Neutrophils # (Auto)


  


  3.7x10^3uL


(1.8-7.7) 


 


 


Lymphocytes # (Auto)


  


  1.3x10^3/uL


(1.0-4.8) 


 


 


Monocytes # (Auto)


  


  0.7x10^3/uL


(0.0-1.1) 


 


 


Eosinophils # (Auto)


  


  0.2x10^3/uL


(0.0-0.7) 


 


 


Basophils # (Auto)


  


  0.0x10^3/uL


(0.0-0.2) 


 


 


Sodium Level


  


  140mmol/L


(136-145) 


 


 


Potassium Level


  


  4.0mmol/L


(3.5-5.1) 


 


 


Chloride Level


  


  104mmol/L


() 


 


 


Carbon Dioxide Level


  


  29mmol/L


(21-32) 


 


 


Anion Gap  7 (6-14)  


 


Blood Urea Nitrogen  19mg/dL (7-20)  


 


Creatinine


  


  3.1mg/dL


(0.6-1.0) 


 


 


Estimated GFR


(Cockcroft-Gault) 


  15.4 


  


 


 


Glucose Level


  


  147mg/dL


(70-99) 


 


 


Calcium Level


  


  7.8mg/dL


(8.5-10.1) 


 


 


Phosphorus Level


  


  3.5mg/dL


(2.6-4.7) 


 


 


Magnesium Level


  


  1.7mg/dL


(1.8-2.4) 


 


 


Albumin


  


  2.1g/dL


(3.4-5.0) 


 











Physical Exam


HEENT:  Neck Supple W Full Motion


Chest:  Symmetric


LUNGS:  Other (diminished bases )


Heart:  S1S2, RRR, other (2/6 systolic murmur )


Abdomen:  Soft N/T


Extremities:  2+ Dorsalis Pedis, No Edema


Neurology:  alert, oriented, follow commands





Assessment


Assessment


1.  Acute on chronic combined systolic and diastolic HF


2.  NSTEMI 


3.  CAD   


4.  Ischemic cardiomyopathy; LVEF 25%


5.  Malignant HTN


6.  HLP


7.  WALTER with CKD, HD initiated 


8.  Noncompliance secondary to financial constraints. 














Recommendations





Remains stable, CP free. Continue medical management. May transfer to [facility]. 


Fluid offloading in HD per nephrology


/ following for assistance


Continue supportive care





MANDO OLSON MD 2/10/17 1921:


CARDIO Progress Notes


Plan


Plan


Patient seen and examined.  Agree with above nurse practitioner note.


No acute events overnight.


Normal cardiac exam.  Blood pressure and heart rate are well controlled.


Labs and medications reviewed.


Continue supportive care from a cardiac perspective.  No further testing 

necessary at this time.  If her hemoglobin has been stable we could ultimately 

consider outpatient cardiac catheterization versus inpatient testing as 

necessary depending on her symptoms.  Would ultimately favor 2 weeks of


Depilated therapy to ensure that she does not have any significant bleeding 

problems prior to considering cardiac catheterization.








WENCESLAO FRASER Feb 10, 2017 14:16


MANDO OLSON MD Feb 10, 2017 19:21

## 2017-02-10 NOTE — PDOC
Dialysis Progress Note


Dialysis Note


Dialysis Note


Seen on Hemodialysis, tolerating treatment        Okay     so far





Vitals on Hemodialysis:  171/72      75   16   afeb





 





 


General Appearance:          


Awake:          


Alert Oriented  x      2-3 


Neck:    No JVD or JVP


Chest:    CTA Roberto


Heart:    S1 S2


Abdomen -    Soft NTND


Extremities -    No Edema











ESRD:   Dialysis as below





F 180 NR  3.0  Hrs





3 K 2.5 Ca 140 Na  30    HC03





Qb 350 + Qd 500+





Heparin   on gtt Units





Uf none Kgs or to dry weight as tolerated





Pt refused Transfusion of PRCBC's previously 





May give 25-50 gms of 25% Albumin or NS if needed to maintain Hemodynamic 

stability





Treatment plan reviewed and discussed with Dialysis RN





Vitals


Vital Signs





 Vital Signs








  Date Time  Temp Pulse Resp B/P Pulse Ox O2 Delivery O2 Flow Rate FiO2


 


2/10/17 06:00  74 19 144/66 92 Room Air  


 


2/10/17 04:00 99.9       





 99.9       


 


2/8/17 09:00       2.0 











Labs


Last Labs





Laboratory Tests








Test


  2/8/17


10:38 2/8/17


11:40 2/8/17


14:12 2/8/17


15:05


 


Glucose (Fingerstick)


  95mg/dL


(70-99) 


  103mg/dL


(70-99) 


 


 


Prothrombin Time


  


  13.5SEC


(11.7-14.0) 


  


 


 


Prothromb Time International


Ratio 


  1.1 (0.8-1.1) 


  


  


 


 


Activated Partial


Thromboplast Time 


  95SEC (24-38) 


  


  


 


 


Estimated GFR (Non-


American 


  11  (>59) 


  


  


 


 


EGFR   13  (>59)   


 


PTH (Intact) Specimen


Description 


  Comment (.) 


  


  


 


 


Parathyroid Hormone (Intact)


  


  192pg/mL


(15-65) 


  


 


 


Calcium (PTH Intact)


  


  8.0mg/dL


(8.7-10.2) 


  


 


 


Creatinine (PTH Intact)


  


  4.14mg/dL


(0.57-1.00) 


  


 


 


Phosphorus (PTH Intact)


  


  5.4mg/dL


(2.5-4.5) 


  


 


 


Hepatitis A IgM Antibody


  


  Negative


(Negative) 


  


 


 


Hepatitis B Surface Antigen


  


  Negative


(Negative) 


  


 


 


Hepatitis B Surface Antibody


  


  Non reactive


(.) 


  


 


 


Hepatitis B Core Total


Antibody 


  Negative


(Negative) 


  


 


 


Hepatitis B Core IgM Antibody


  


  Negative


(Negative) 


  


 


 


Hepatitis C Antibody


  


  <0.1s/co ratio


(0.0-0.9) 


  


 


 


Heparin Anti-Xa Act,


Unfractionated 


  


  


  0.38IU/mL


(0.30-0.70)














Test


  2/8/17


16:37 2/9/17


06:24 2/9/17


08:29 2/9/17


13:46


 


Glucose (Fingerstick)


  182mg/dL


(70-99) 


  139mg/dL


(70-99) 99mg/dL


(70-99)


 


White Blood Count


  


  6.4x10^3/uL


(4.0-11.0) 


  


 


 


Red Blood Count


  


  2.31x10^6/uL


(3.50-5.40) 


  


 


 


Hemoglobin


  


  7.0g/dL


(12.0-15.5) 


  


 


 


Hematocrit


  


  21.1%


(36.0-47.0) 


  


 


 


Mean Corpuscular Volume  91fL ()   


 


Mean Corpuscular Hemoglobin  30pg (25-35)   


 


Mean Corpuscular Hemoglobin


Concent 


  33g/dL (31-37) 


  


  


 


 


Red Cell Distribution Width


  


  14.5%


(11.5-14.5) 


  


 


 


Platelet Count


  


  169x10^3/uL


(140-400) 


  


 


 


Neutrophils (%) (Auto)  73% (31-73)   


 


Lymphocytes (%) (Auto)  19% (24-48)   


 


Monocytes (%) (Auto)  6% (0-9)   


 


Eosinophils (%) (Auto)  2% (0-3)   


 


Basophils (%) (Auto)  0% (0-3)   


 


Neutrophils # (Auto)


  


  4.6x10^3uL


(1.8-7.7) 


  


 


 


Lymphocytes # (Auto)


  


  1.2x10^3/uL


(1.0-4.8) 


  


 


 


Monocytes # (Auto)


  


  0.4x10^3/uL


(0.0-1.1) 


  


 


 


Eosinophils # (Auto)


  


  0.1x10^3/uL


(0.0-0.7) 


  


 


 


Basophils # (Auto)


  


  0.0x10^3/uL


(0.0-0.2) 


  


 


 


Reticulocyte Count (auto)  1.9% (0.5-2.5)   


 


Sodium Level


  


  139mmol/L


(136-145) 


  


 


 


Potassium Level


  


  3.7mmol/L


(3.5-5.1) 


  


 


 


Chloride Level


  


  102mmol/L


() 


  


 


 


Carbon Dioxide Level


  


  30mmol/L


(21-32) 


  


 


 


Anion Gap  7 (6-14)   


 


Blood Urea Nitrogen  23mg/dL (7-20)   


 


Creatinine


  


  2.9mg/dL


(0.6-1.0) 


  


 


 


Estimated GFR


(Cockcroft-Gault) 


  16.6 


  


  


 


 


Glucose Level


  


  164mg/dL


(70-99) 


  


 


 


Calcium Level


  


  7.5mg/dL


(8.5-10.1) 


  


 


 


Phosphorus Level


  


  3.0mg/dL


(2.6-4.7) 


  


 


 


Magnesium Level


  


  1.8mg/dL


(1.8-2.4) 


  


 


 


Iron Level


  


  26ug/dL


() 


  


 


 


Total Iron Binding Capacity


  


  237ug/dL


(250-450) 


  


 


 


Iron Saturation  11% (15-34)   


 


Ferritin


  


  62ng/mL


(8-252) 


  


 


 


Albumin


  


  2.1g/dL


(3.4-5.0) 


  


 


 


Vitamin B12 Level


  


  286pg/mL


(211-946) 


  


 














Test


  2/9/17


17:59 2/10/17


06:00 


  


 


 


Glucose (Fingerstick)


  170mg/dL


(70-99) 


  


  


 


 


White Blood Count


  


  5.9x10^3/uL


(4.0-11.0) 


  


 


 


Red Blood Count


  


  2.29x10^6/uL


(3.50-5.40) 


  


 


 


Hemoglobin


  


  7.0g/dL


(12.0-15.5) 


  


 


 


Hematocrit


  


  21.4%


(36.0-47.0) 


  


 


 


Mean Corpuscular Volume  93fL ()   


 


Mean Corpuscular Hemoglobin  31pg (25-35)   


 


Mean Corpuscular Hemoglobin


Concent 


  33g/dL (31-37) 


  


  


 


 


Red Cell Distribution Width


  


  15.0%


(11.5-14.5) 


  


 


 


Platelet Count


  


  157x10^3/uL


(140-400) 


  


 


 


Neutrophils (%) (Auto)  62% (31-73)   


 


Lymphocytes (%) (Auto)  22% (24-48)   


 


Monocytes (%) (Auto)  12% (0-9)   


 


Eosinophils (%) (Auto)  3% (0-3)   


 


Basophils (%) (Auto)  1% (0-3)   


 


Neutrophils # (Auto)


  


  3.7x10^3uL


(1.8-7.7) 


  


 


 


Lymphocytes # (Auto)


  


  1.3x10^3/uL


(1.0-4.8) 


  


 


 


Monocytes # (Auto)


  


  0.7x10^3/uL


(0.0-1.1) 


  


 


 


Eosinophils # (Auto)


  


  0.2x10^3/uL


(0.0-0.7) 


  


 


 


Basophils # (Auto)


  


  0.0x10^3/uL


(0.0-0.2) 


  


 


 


Sodium Level


  


  140mmol/L


(136-145) 


  


 


 


Potassium Level


  


  4.0mmol/L


(3.5-5.1) 


  


 


 


Chloride Level


  


  104mmol/L


() 


  


 


 


Carbon Dioxide Level


  


  29mmol/L


(21-32) 


  


 


 


Anion Gap  7 (6-14)   


 


Blood Urea Nitrogen  19mg/dL (7-20)   


 


Creatinine


  


  3.1mg/dL


(0.6-1.0) 


  


 


 


Estimated GFR


(Cockcroft-Gault) 


  15.4 


  


  


 


 


Glucose Level


  


  147mg/dL


(70-99) 


  


 


 


Calcium Level


  


  7.8mg/dL


(8.5-10.1) 


  


 


 


Phosphorus Level


  


  3.5mg/dL


(2.6-4.7) 


  


 


 


Magnesium Level


  


  1.7mg/dL


(1.8-2.4) 


  


 


 


Albumin


  


  2.1g/dL


(3.4-5.0) 


  


 








Laboratory Tests








Test


  2/9/17


13:46 2/9/17


17:59 2/10/17


06:00


 


Glucose (Fingerstick)


  99mg/dL


(70-99) 170mg/dL


(70-99) 


 


 


White Blood Count


  


  


  5.9x10^3/uL


(4.0-11.0)


 


Red Blood Count


  


  


  2.29x10^6/uL


(3.50-5.40)


 


Hemoglobin


  


  


  7.0g/dL


(12.0-15.5)


 


Hematocrit


  


  


  21.4%


(36.0-47.0)


 


Mean Corpuscular Volume   93fL () 


 


Mean Corpuscular Hemoglobin   31pg (25-35) 


 


Mean Corpuscular Hemoglobin


Concent 


  


  33g/dL (31-37) 


 


 


Red Cell Distribution Width


  


  


  15.0%


(11.5-14.5)


 


Platelet Count


  


  


  157x10^3/uL


(140-400)


 


Neutrophils (%) (Auto)   62% (31-73) 


 


Lymphocytes (%) (Auto)   22% (24-48) 


 


Monocytes (%) (Auto)   12% (0-9) 


 


Eosinophils (%) (Auto)   3% (0-3) 


 


Basophils (%) (Auto)   1% (0-3) 


 


Neutrophils # (Auto)


  


  


  3.7x10^3uL


(1.8-7.7)


 


Lymphocytes # (Auto)


  


  


  1.3x10^3/uL


(1.0-4.8)


 


Monocytes # (Auto)


  


  


  0.7x10^3/uL


(0.0-1.1)


 


Eosinophils # (Auto)


  


  


  0.2x10^3/uL


(0.0-0.7)


 


Basophils # (Auto)


  


  


  0.0x10^3/uL


(0.0-0.2)


 


Sodium Level


  


  


  140mmol/L


(136-145)


 


Potassium Level


  


  


  4.0mmol/L


(3.5-5.1)


 


Chloride Level


  


  


  104mmol/L


()


 


Carbon Dioxide Level


  


  


  29mmol/L


(21-32)


 


Anion Gap   7 (6-14) 


 


Blood Urea Nitrogen   19mg/dL (7-20) 


 


Creatinine


  


  


  3.1mg/dL


(0.6-1.0)


 


Estimated GFR


(Cockcroft-Gault) 


  


  15.4 


 


 


Glucose Level


  


  


  147mg/dL


(70-99)


 


Calcium Level


  


  


  7.8mg/dL


(8.5-10.1)


 


Phosphorus Level


  


  


  3.5mg/dL


(2.6-4.7)


 


Magnesium Level


  


  


  1.7mg/dL


(1.8-2.4)


 


Albumin


  


  


  2.1g/dL


(3.4-5.0)











Assessment


Assessment


 Problems


Medical Problems:


(1) Congestive heart failure


Status: Acute  





(2) NSTEMI (non-ST elevated myocardial infarction)


Status: Acute  





Problems:  





Plan


Plan of Care


 Problems


Medical Problems:


(1) Congestive heart failure


Status: Acute  





(2) NSTEMI (non-ST elevated myocardial infarction)


Status: Acute  











RENAN DODD MD Feb 10, 2017 10:24

## 2017-02-11 NOTE — PDOC
Provider Note


Provider Note


Stop lab draws. 


Stop plavix


supportive care.


No further CV recs. 


Ok to DC with [facility]SA only. 


Will need social work eval likely. 


Thx.








MANDO OLSON MD Feb 11, 2017 10:39

## 2017-02-11 NOTE — PDOC
PROGRESS NOTES


Chief Complaint


Chief Complaint


cc: sob 





A//P


1.  Acute on chronic systolic congestive heart failure.


2.  Non-ST-segment elevation myocardial infarction.


3.  WALTER


4.  Ischemic cardiomyopathy.


5.  Malignant hypertension, Better


6.  Anemia


7.  Hyperlipidemia.


8.  Psoriasis.


9. CAD


10. Anemia





Plan 





HD nephrology 


SW to arrange out pt HD


BP controlled, 


low hemoglobin but pt declined to have blood products due to Taoism reasons. 

risks and benefits explained. 


SSI 


Lipitor 


labs reviewed 


Monitor hemoglobin 


Prognosis guarded.





History of Present Illness


History of Present Illness


sob better


no fever


no chills. 


no chest pain





Vitals


Vitals





 Vital Signs








  Date Time  Temp Pulse Resp B/P Pulse Ox O2 Delivery O2 Flow Rate FiO2


 


2/11/17 11:00 98.6 69 19 115/53  Room Air  





 98.6       


 


2/11/17 03:22     97   


 


2/10/17 14:25       2.0 











Physical Exam


General:  Alert, Oriented X3, Cooperative, moderate distress


Heart:  Normal S1, Normal S2, Other (tele: ST, 2/6 systolci murmur )


Lungs:  Clear, Other


Abdomen:  Soft, No tenderness


Extremities:  No cyanosis, No edema, Normal pulses


Skin:  No breakdown, No significant lesion, Other (psoriasis )





Labs


LABS





Laboratory Tests








Test


  2/10/17


11:58 2/10/17


16:57 2/10/17


21:14 2/11/17


04:25


 


Glucose (Fingerstick)


  96mg/dL


(70-99) 186mg/dL


(70-99) 178mg/dL


(70-99) 


 


 


White Blood Count


  


  


  


  6.4x10^3/uL


(4.0-11.0)


 


Red Blood Count


  


  


  


  2.22x10^6/uL


(3.50-5.40)


 


Hemoglobin


  


  


  


  6.9g/dL


(12.0-15.5)


 


Hematocrit


  


  


  


  20.4%


(36.0-47.0)


 


Mean Corpuscular Volume    92fL () 


 


Mean Corpuscular Hemoglobin    31pg (25-35) 


 


Mean Corpuscular Hemoglobin


Concent 


  


  


  34g/dL (31-37) 


 


 


Red Cell Distribution Width


  


  


  


  14.8%


(11.5-14.5)


 


Platelet Count


  


  


  


  154x10^3/uL


(140-400)


 


Neutrophils (%) (Auto)    64% (31-73) 


 


Lymphocytes (%) (Auto)    20% (24-48) 


 


Monocytes (%) (Auto)    11% (0-9) 


 


Eosinophils (%) (Auto)    4% (0-3) 


 


Basophils (%) (Auto)    1% (0-3) 


 


Neutrophils # (Auto)


  


  


  


  4.1x10^3uL


(1.8-7.7)


 


Lymphocytes # (Auto)


  


  


  


  1.3x10^3/uL


(1.0-4.8)


 


Monocytes # (Auto)


  


  


  


  0.7x10^3/uL


(0.0-1.1)


 


Eosinophils # (Auto)


  


  


  


  0.3x10^3/uL


(0.0-0.7)


 


Basophils # (Auto)


  


  


  


  0.0x10^3/uL


(0.0-0.2)


 


Sodium Level


  


  


  


  138mmol/L


(136-145)


 


Potassium Level


  


  


  


  4.2mmol/L


(3.5-5.1)


 


Chloride Level


  


  


  


  104mmol/L


()


 


Carbon Dioxide Level


  


  


  


  28mmol/L


(21-32)


 


Anion Gap    6 (6-14) 


 


Blood Urea Nitrogen    16mg/dL (7-20) 


 


Creatinine


  


  


  


  2.7mg/dL


(0.6-1.0)


 


Estimated GFR


(Cockcroft-Gault) 


  


  


  18.0 


 


 


Glucose Level


  


  


  


  160mg/dL


(70-99)


 


Calcium Level


  


  


  


  7.8mg/dL


(8.5-10.1)


 


Phosphorus Level


  


  


  


  3.6mg/dL


(2.6-4.7)


 


Magnesium Level


  


  


  


  1.9mg/dL


(1.8-2.4)


 


Albumin


  


  


  


  2.1g/dL


(3.4-5.0)














Test


  2/11/17


07:46 


  


  


 


 


Glucose (Fingerstick)


  131mg/dL


(70-99) 


  


  


 











Assessment and Plan


Assessmemt and Plan


 Problems


Medical Problems:


(1) Congestive heart failure


Status: Acute  





(2) NSTEMI (non-ST elevated myocardial infarction)


Status: Acute  








Problems:  





Comment


Review of Relevant


I have reviewed the following items lore (where applicable) has been applied.


Labs





Laboratory Tests








Test


  2/9/17


13:46 2/9/17


17:59 2/10/17


06:00 2/10/17


11:58


 


Glucose (Fingerstick)


  99mg/dL


(70-99) 170mg/dL


(70-99) 


  96mg/dL


(70-99)


 


White Blood Count


  


  


  5.9x10^3/uL


(4.0-11.0) 


 


 


Red Blood Count


  


  


  2.29x10^6/uL


(3.50-5.40) 


 


 


Hemoglobin


  


  


  7.0g/dL


(12.0-15.5) 


 


 


Hematocrit


  


  


  21.4%


(36.0-47.0) 


 


 


Mean Corpuscular Volume   93fL ()  


 


Mean Corpuscular Hemoglobin   31pg (25-35)  


 


Mean Corpuscular Hemoglobin


Concent 


  


  33g/dL (31-37) 


  


 


 


Red Cell Distribution Width


  


  


  15.0%


(11.5-14.5) 


 


 


Platelet Count


  


  


  157x10^3/uL


(140-400) 


 


 


Neutrophils (%) (Auto)   62% (31-73)  


 


Lymphocytes (%) (Auto)   22% (24-48)  


 


Monocytes (%) (Auto)   12% (0-9)  


 


Eosinophils (%) (Auto)   3% (0-3)  


 


Basophils (%) (Auto)   1% (0-3)  


 


Neutrophils # (Auto)


  


  


  3.7x10^3uL


(1.8-7.7) 


 


 


Lymphocytes # (Auto)


  


  


  1.3x10^3/uL


(1.0-4.8) 


 


 


Monocytes # (Auto)


  


  


  0.7x10^3/uL


(0.0-1.1) 


 


 


Eosinophils # (Auto)


  


  


  0.2x10^3/uL


(0.0-0.7) 


 


 


Basophils # (Auto)


  


  


  0.0x10^3/uL


(0.0-0.2) 


 


 


Sodium Level


  


  


  140mmol/L


(136-145) 


 


 


Potassium Level


  


  


  4.0mmol/L


(3.5-5.1) 


 


 


Chloride Level


  


  


  104mmol/L


() 


 


 


Carbon Dioxide Level


  


  


  29mmol/L


(21-32) 


 


 


Anion Gap   7 (6-14)  


 


Blood Urea Nitrogen   19mg/dL (7-20)  


 


Creatinine


  


  


  3.1mg/dL


(0.6-1.0) 


 


 


Estimated GFR


(Cockcroft-Gault) 


  


  15.4 


  


 


 


Glucose Level


  


  


  147mg/dL


(70-99) 


 


 


Calcium Level


  


  


  7.8mg/dL


(8.5-10.1) 


 


 


Phosphorus Level


  


  


  3.5mg/dL


(2.6-4.7) 


 


 


Magnesium Level


  


  


  1.7mg/dL


(1.8-2.4) 


 


 


Albumin


  


  


  2.1g/dL


(3.4-5.0) 


 














Test


  2/10/17


16:57 2/10/17


21:14 2/11/17


04:25 2/11/17


07:46


 


Glucose (Fingerstick)


  186mg/dL


(70-99) 178mg/dL


(70-99) 


  131mg/dL


(70-99)


 


White Blood Count


  


  


  6.4x10^3/uL


(4.0-11.0) 


 


 


Red Blood Count


  


  


  2.22x10^6/uL


(3.50-5.40) 


 


 


Hemoglobin


  


  


  6.9g/dL


(12.0-15.5) 


 


 


Hematocrit


  


  


  20.4%


(36.0-47.0) 


 


 


Mean Corpuscular Volume   92fL ()  


 


Mean Corpuscular Hemoglobin   31pg (25-35)  


 


Mean Corpuscular Hemoglobin


Concent 


  


  34g/dL (31-37) 


  


 


 


Red Cell Distribution Width


  


  


  14.8%


(11.5-14.5) 


 


 


Platelet Count


  


  


  154x10^3/uL


(140-400) 


 


 


Neutrophils (%) (Auto)   64% (31-73)  


 


Lymphocytes (%) (Auto)   20% (24-48)  


 


Monocytes (%) (Auto)   11% (0-9)  


 


Eosinophils (%) (Auto)   4% (0-3)  


 


Basophils (%) (Auto)   1% (0-3)  


 


Neutrophils # (Auto)


  


  


  4.1x10^3uL


(1.8-7.7) 


 


 


Lymphocytes # (Auto)


  


  


  1.3x10^3/uL


(1.0-4.8) 


 


 


Monocytes # (Auto)


  


  


  0.7x10^3/uL


(0.0-1.1) 


 


 


Eosinophils # (Auto)


  


  


  0.3x10^3/uL


(0.0-0.7) 


 


 


Basophils # (Auto)


  


  


  0.0x10^3/uL


(0.0-0.2) 


 


 


Sodium Level


  


  


  138mmol/L


(136-145) 


 


 


Potassium Level


  


  


  4.2mmol/L


(3.5-5.1) 


 


 


Chloride Level


  


  


  104mmol/L


() 


 


 


Carbon Dioxide Level


  


  


  28mmol/L


(21-32) 


 


 


Anion Gap   6 (6-14)  


 


Blood Urea Nitrogen   16mg/dL (7-20)  


 


Creatinine


  


  


  2.7mg/dL


(0.6-1.0) 


 


 


Estimated GFR


(Cockcroft-Gault) 


  


  18.0 


  


 


 


Glucose Level


  


  


  160mg/dL


(70-99) 


 


 


Calcium Level


  


  


  7.8mg/dL


(8.5-10.1) 


 


 


Phosphorus Level


  


  


  3.6mg/dL


(2.6-4.7) 


 


 


Magnesium Level


  


  


  1.9mg/dL


(1.8-2.4) 


 


 


Albumin


  


  


  2.1g/dL


(3.4-5.0) 


 








Laboratory Tests








Test


  2/10/17


11:58 2/10/17


16:57 2/10/17


21:14 2/11/17


04:25


 


Glucose (Fingerstick)


  96mg/dL


(70-99) 186mg/dL


(70-99) 178mg/dL


(70-99) 


 


 


White Blood Count


  


  


  


  6.4x10^3/uL


(4.0-11.0)


 


Red Blood Count


  


  


  


  2.22x10^6/uL


(3.50-5.40)


 


Hemoglobin


  


  


  


  6.9g/dL


(12.0-15.5)


 


Hematocrit


  


  


  


  20.4%


(36.0-47.0)


 


Mean Corpuscular Volume    92fL () 


 


Mean Corpuscular Hemoglobin    31pg (25-35) 


 


Mean Corpuscular Hemoglobin


Concent 


  


  


  34g/dL (31-37) 


 


 


Red Cell Distribution Width


  


  


  


  14.8%


(11.5-14.5)


 


Platelet Count


  


  


  


  154x10^3/uL


(140-400)


 


Neutrophils (%) (Auto)    64% (31-73) 


 


Lymphocytes (%) (Auto)    20% (24-48) 


 


Monocytes (%) (Auto)    11% (0-9) 


 


Eosinophils (%) (Auto)    4% (0-3) 


 


Basophils (%) (Auto)    1% (0-3) 


 


Neutrophils # (Auto)


  


  


  


  4.1x10^3uL


(1.8-7.7)


 


Lymphocytes # (Auto)


  


  


  


  1.3x10^3/uL


(1.0-4.8)


 


Monocytes # (Auto)


  


  


  


  0.7x10^3/uL


(0.0-1.1)


 


Eosinophils # (Auto)


  


  


  


  0.3x10^3/uL


(0.0-0.7)


 


Basophils # (Auto)


  


  


  


  0.0x10^3/uL


(0.0-0.2)


 


Sodium Level


  


  


  


  138mmol/L


(136-145)


 


Potassium Level


  


  


  


  4.2mmol/L


(3.5-5.1)


 


Chloride Level


  


  


  


  104mmol/L


()


 


Carbon Dioxide Level


  


  


  


  28mmol/L


(21-32)


 


Anion Gap    6 (6-14) 


 


Blood Urea Nitrogen    16mg/dL (7-20) 


 


Creatinine


  


  


  


  2.7mg/dL


(0.6-1.0)


 


Estimated GFR


(Cockcroft-Gault) 


  


  


  18.0 


 


 


Glucose Level


  


  


  


  160mg/dL


(70-99)


 


Calcium Level


  


  


  


  7.8mg/dL


(8.5-10.1)


 


Phosphorus Level


  


  


  


  3.6mg/dL


(2.6-4.7)


 


Magnesium Level


  


  


  


  1.9mg/dL


(1.8-2.4)


 


Albumin


  


  


  


  2.1g/dL


(3.4-5.0)














Test


  2/11/17


07:46 


  


  


 


 


Glucose (Fingerstick)


  131mg/dL


(70-99) 


  


  


 








Microbiology


2/10/17 Blood Culture - Preliminary, Resulted


          NO GROWTH AFTER 1 DAY


Medications





 Current Medications


Aspirin 324 mg 324 mg 1X  ONCE PO  Last administered on 2/7/17at 12:59;  Start 2 /7/17 at 12:45;  Stop 2/7/17 at 12:48;  Status DC


Heparin Sodium/ Dextrose 500 ml @  19 mls/hr CONT  PRN IV SEE I/O RECORD Last 

administered on 2/8/17at 14:45;  Start 2/7/17 at 12:45;  Stop 2/9/17 at 15:30;  

Status DC


Heparin Sodium (Porcine) 1,950 unit PRN Q6HRS  PRN IV FOR UFH LEVEL LESS THAN 

0.2 Last administered on 2/7/17at 13:01;  Start 2/7/17 at 12:45;  Stop 2/9/17 

at 15:30;  Status DC


Ondansetron HCl (Zofran) 4 mg PRN Q8HRS  PRN IV NAUSEA/VOMITING;  Start 2/7/17 

at 12:45;  Stop 2/8/17 at 12:44;  Status DC


Morphine Sulfate 4 mg PRN Q2HR  PRN IV PAIN Last administered on 2/7/17at 14:03

;  Start 2/7/17 at 12:45;  Stop 2/8/17 at 12:44;  Status DC


Nitroglycerin (Nitrostat) 0.4 mg PRN Q5MIN  PRN SL CHEST PAIN Last administered 

on 2/7/17at 14:03;  Start 2/7/17 at 12:45;  Stop 2/8/17 at 12:44;  Status DC


Amlodipine Besylate (Norvasc) 5 mg DAILYWLUN PO  Last administered on 2/8/17at 

08:32;  Start 2/7/17 at 14:00;  Stop 2/8/17 at 16:02;  Status DC


Aspirin (Ecotrin) 81 mg DAILYWBKFT PO  Last administered on 2/11/17at 09:26;  

Start 2/7/17 at 14:00


Atorvastatin Calcium (Lipitor) 80 mg QHS PO  Last administered on 2/10/17at 21:

10;  Start 2/7/17 at 21:00


Carvedilol (Coreg) 25 mg BIDWMEALS PO  Last administered on 2/11/17at 09:26;  

Start 2/7/17 at 17:00


Clopidogrel Bisulfate (Plavix) 75 mg DAILY PO  Last administered on 2/7/17at 14:

28;  Start 2/7/17 at 14:00;  Stop 2/8/17 at 08:41;  Status DC


Isosorbide Mononitrate (Imdur) 30 mg DAILY PO  Last administered on 2/11/17at 09

:25;  Start 2/7/17 at 14:00


Furosemide (Lasix) 40 mg 1X  ONCE IVP  Last administered on 2/7/17at 14:30;  

Start 2/7/17 at 14:00;  Stop 2/7/17 at 14:23;  Status DC


Furosemide 40 mg 40 mg 1X  ONCE IVP  Last administered on 2/7/17at 14:30;  

Start 2/7/17 at 14:30;  Stop 2/7/17 at 14:38;  Status DC


Nitroglycerin/ Dextrose (Nitroglycerin Drip) 250 ml @ 0 mls/hr CONT  PRN IV SEE 

I/O RECORD Last administered on 2/7/17at 15:23;  Start 2/7/17 at 14:30;  Stop 2/ 8/17 at 16:02;  Status DC


Acetaminophen (Tylenol) 325 mg PRN Q6HRS  PRN PO MILD PAIN / TEMP;  Start 2/7/ 17 at 15:00


Acetaminophen/ Hydrocodone Bitart (Lortab 5/325) 1 tab PRN Q6HRS  PRN PO 

MODERATE TO SEVERE PAIN;  Start 2/7/17 at 15:00


Hydralazine HCl (Apresoline) 10 mg PRN Q4HRS  PRN IVP ELEVATED BP, SEE COMMENTS

;  Start 2/7/17 at 15:00


Ondansetron HCl (Zofran) 4 mg PRN Q8HRS  PRN IV NAUSEA/VOMITING;  Start 2/7/17 

at 15:00


Albuterol Sulfate (Ventolin Neb Soln) 2.5 mg PRN Q4HRS  PRN NEB SHORTNESS OF 

BREATH;  Start 2/7/17 at 15:00


Insulin Aspart (Novolog) 0-7 UNITS TIDWMEALS SQ  Last administered on 2/10/17at 

17:03;  Start 2/7/17 at 17:00


Dextrose 12.5 gm PRN Q15MIN  PRN IV SEE COMMENTS;  Start 2/7/17 at 15:00


Info 1 each 1 each PRN DAILY  PRN MC SEE COMMENTS Last administered on 2/9/17at 

09:38;  Start 2/7/17 at 17:00;  Stop 2/10/17 at 08:25;  Status DC


Magnesium Sulfate/ Dextrose (Magnesium Sulfate PREMIX 2GM) 50 ml @ 25 mls/hr 

PRN DAILY  PRN IV for Mag < 1.7 on am labs;  Start 2/8/17 at 11:15


Lidocaine/Sodium Bicarbonate (Buffered Lidocaine 1%) 3 ml 1X  ONCE IJ  Last 

administered on 2/8/17at 14:03;  Start 2/8/17 at 12:30;  Stop 2/8/17 at 12:34;  

Status DC


Heparin Sodium (Porcine) 2,400 unit 1X  ONCE INT CAT  Last administered on 2/8/ 17at 14:04;  Start 2/8/17 at 12:30;  Stop 2/8/17 at 12:34;  Status DC


Heparin Sodium/ Sodium Chloride 60 unit 1X  ONCE IV  Last administered on 2/8/ 17at 14:04;  Start 2/8/17 at 12:30;  Stop 2/8/17 at 12:34;  Status DC


Lisinopril (Prinivil) 20 mg DAILY PO  Last administered on 2/11/17at 09:26;  

Start 2/8/17 at 16:00


Hydralazine HCl 50 mg 50 mg BID PO  Last administered on 2/11/17at 09:27;  

Start 2/8/17 at 21:00


Sodium Chloride (Iv Sodium Chloride 0.9% 1000ml Bag) 1,000 ml @  1,000 mls/hr 

Q1H PRN IV hypotension;  Start 2/8/17 at 18:28;  Stop 2/9/17 at 00:27;  Status 

DC


Diphenhydramine HCl (Benadryl) 25 mg 1X PRN  PRN IV ITCHING;  Start 2/8/17 at 18

:30;  Stop 2/9/17 at 18:29;  Status DC


Diphenhydramine HCl (Benadryl) 25 mg 1X PRN  PRN IV ITCHING;  Start 2/8/17 at 18

:30;  Stop 2/9/17 at 18:29;  Status DC


Sodium Chloride (Normal Saline Flush) 10 ml 1X PRN  PRN IV AP catheter pack;  

Start 2/8/17 at 18:30;  Stop 2/9/17 at 18:29;  Status DC


Sodium Chloride 10 ml 10 ml 1X PRN  PRN IV  catheter pack;  Start 2/8/17 at 18

:30;  Stop 2/9/17 at 18:29;  Status DC


Sodium Chloride (Iv Sodium Chloride 0.9% 1000ml Bag) 1,000 ml @  400 mls/hr 

Q2H30M PRN IV PATENCY;  Start 2/8/17 at 18:28;  Stop 2/9/17 at 06:27;  Status DC


Info (PHARMACY MONITORING -- do not chart) 1 each PRN DAILY  PRN MC SEE COMMENTS

;  Start 2/8/17 at 18:30


Info (PHARMACY MONITORING -- do not chart) 1 each PRN DAILY  PRN MC SEE COMMENTS

;  Start 2/9/17 at 10:30;  Status UNV


Info (PHARMACY MONITORING -- do not chart) 1 each PRN DAILY  PRN MC SEE COMMENTS

;  Start 2/9/17 at 10:30;  Status Cancel


Darbepoetin Valeriano (Aranesp) 60 mcg WEEKLYHS SQ  Last administered on 2/9/17at 20:

48;  Start 2/9/17 at 21:00


Clopidogrel Bisulfate (Plavix) 75 mg DAILYWBKFT PO ;  Start 2/10/17 at 08:00;  

Stop 2/10/17 at 08:00;  Status DC


Clopidogrel Bisulfate 75 mg 75 mg DAILYWBKFT PO  Last administered on 2/10/17at 

08:25;  Start 2/9/17 at 15:30


Sodium Chloride (Iv Sodium Chloride 0.9% 1000ml Bag) 1,000 ml @  1,000 mls/hr 

Q1H PRN IV hypotension;  Start 2/10/17 at 08:11;  Stop 2/10/17 at 15:00;  

Status DC


Diphenhydramine HCl (Benadryl) 25 mg 1X PRN  PRN IV ITCHING;  Start 2/10/17 at 

08:15;  Stop 2/10/17 at 15:00;  Status DC


Diphenhydramine HCl (Benadryl) 25 mg 1X PRN  PRN IV ITCHING;  Start 2/10/17 at 

08:15;  Stop 2/10/17 at 15:00;  Status DC


Sodium Chloride (Normal Saline Flush) 10 ml 1X PRN  PRN IV AP catheter pack;  

Start 2/10/17 at 08:15;  Stop 2/10/17 at 15:00;  Status DC


Sodium Chloride 10 ml 10 ml 1X PRN  PRN IV  catheter pack;  Start 2/10/17 at 

08:15;  Stop 2/10/17 at 15:00;  Status DC


Sodium Chloride (Iv Sodium Chloride 0.9% 1000ml Bag) 1,000 ml @  400 mls/hr 

Q2H30M PRN IV PATENCY;  Start 2/10/17 at 08:11;  Stop 2/10/17 at 21:00;  Status 

DC


Info 1 each 1 each PRN DAILY  PRN MC SEE COMMENTS;  Start 2/10/17 at 08:15;  

Status UNV


Magnesium Sulfate/ Dextrose (Magnesium Sulfate PREMIX 1GM) 100 ml @  100 mls/hr 

1X  ONCE IV  Last administered on 2/10/17at 12:36;  Start 2/10/17 at 10:30;  

Stop 2/10/17 at 11:29;  Status DC





Active Scripts


Active


Lisinopril 20 Mg Tablet 20 Mg PO QHS


Isosorbide Mononitrate Er (Isosorbide Mononitrate) 30 Mg Tab.er.24h 30 Mg PO 

DAILY


Hydralazine Hcl 50 Mg Tablet 100 Mg PO TID


Carvedilol 12.5 Mg Tablet 25 Mg PO BIDWMEALS


Atorvastatin Calcium 40 Mg Tablet 80 Mg PO QHS


Aspirin Ec (Aspirin) 81 Mg Tablet.dr 81 Mg PO DAILYWBKFT


Amlodipine Besylate 5 Mg Tablet 5 Mg PO DAILYWLUN


Reported


Clopidogrel (Clopidogrel Bisulfate) 75 Mg Tablet 75 Mg PO DAILY


Glyburide 5 Mg Tablet 1 Tab PO BIDWMEALS


Lisinopril 20 Mg Tablet 1 Tab PO DAILY


Vitals/I & O





 Vital Sign - Last 24 Hours








 2/10/17 2/10/17 2/10/17 2/10/17





 12:00 12:49 12:49 12:49


 


Temp 99.6   





 99.6   


 


Pulse 74 74 74 74


 


Resp 21   


 


B/P 162/75 164/68 164/68 164/68


 


Pulse Ox 98   


 


O2 Delivery Room Air   


 


    





    





 2/10/17 2/10/17 2/10/17 2/10/17





 12:50 14:25 17:00 17:03


 


Temp    98.6





    98.6


 


Pulse 74  71 71


 


Resp    18


 


B/P 164/68  126/58 126/58


 


Pulse Ox    98


 


O2 Delivery  Room Air  Room Air


 


O2 Flow Rate  2.0  


 


    





    





 2/10/17 2/10/17 2/10/17 2/10/17





 19:00 20:00 21:10 23:00


 


Temp 99.2   





 99.2   


 


Pulse 66  69 73


 


Resp 21   19


 


B/P   141/69 129/62


 


Pulse Ox    96


 


O2 Delivery Room Air Room Air  Room Air


 


    





    





 2/11/17 2/11/17 2/11/17 2/11/17





 00:15 00:15 03:22 07:00


 


Temp 98.4  98.2 98.0





 98.4  98.2 98.0


 


Pulse 72  73 69


 


Resp 18  18 15


 


B/P 129/63  142/65 142/66


 


Pulse Ox 99  97 


 


O2 Delivery Room Air Room Air Room Air Room Air


 


    





    





 2/11/17 2/11/17 2/11/17 2/11/17





 08:00 09:25 09:26 09:26


 


Pulse  73 73 73


 


B/P  142/65 142/65 142/65


 


O2 Delivery Room Air   





 2/11/17 2/11/17  





 09:27 11:00  


 


Temp  98.6  





  98.6  


 


Pulse 73 69  


 


Resp  19  


 


B/P 142/65 115/53  


 


O2 Delivery  Room Air  














 Intake and Output   


 


 2/10/17 2/10/17 2/11/17





 15:00 23:00 07:00


 


Intake Total 200 ml 240 ml 100 ml


 


Balance 200 ml 240 ml 100 ml














OSMEL VALDEZ MD Feb 11, 2017 11:57

## 2017-02-12 NOTE — PDOC
PROGRESS NOTES


Chief Complaint


Chief Complaint


cc: sob 





A//P


1.  Acute on chronic systolic congestive heart failure. improved 


2.  Non-ST-segment elevation myocardial infarction.


3.  WALTER


4.  Ischemic cardiomyopathy.


5.  Malignant hypertension, Better


6.  Anemia


7.  Hyperlipidemia.


8.  Psoriasis.


9. CAD


10. Anemia


11. UTI not POA





Plan 


IV Rocephin


HD per  nephrology 


SW to arrange out pt HD


BP controlled, 


low hemoglobin but pt declined to have blood products due to Confucianism reasons. 

risks and benefits explained. 


SSI 


Lipitor 


labs reviewed 


Monitor hemoglobin 


Prognosis guarded.





History of Present Illness


History of Present Illness


sob better


no fever


no chills. 


no chest pain





Vitals


Vitals





 Vital Signs








  Date Time  Temp Pulse Resp B/P Pulse Ox O2 Delivery O2 Flow Rate FiO2


 


2/12/17 14:30 97.9 75 20 134/54 100 Room Air  





 97.9       











Physical Exam


General:  Alert, Oriented X3


Heart:  Regular rate, Normal S1, Normal S2, No murmurs, Gallops


Lungs:  Clear, Other


Abdomen:  Normal bowel sounds, Soft, No tenderness, No hepatosplenomegaly, No 

masses


Extremities:  No clubbing, No cyanosis, No edema, Normal pulses, No tenderness/

swelling


Skin:  No breakdown, No significant lesion, Other (psoriasis )





Labs


LABS





Laboratory Tests








Test


  2/11/17


16:34 2/11/17


20:40 2/12/17


00:45 2/12/17


05:00


 


Glucose (Fingerstick)


  204mg/dL


(70-99) 243mg/dL


(70-99) 


  


 


 


Urine Collection Type   Unknown  


 


Urine Color   Yellow  


 


Urine Clarity   Turbid  


 


Urine pH   6.0  


 


Urine Specific Gravity   1.015  


 


Urine Protein


  


  


  >=300mg/dL


(NEG-TRACE) 


 


 


Urine Glucose (UA)


  


  


  Negativemg/dL


(NEG) 


 


 


Urine Ketones (Stick)


  


  


  Negativemg/dL


(NEG) 


 


 


Urine Blood   Moderate (NEG)  


 


Urine Nitrite   Positive (NEG)  


 


Urine Bilirubin   Negative (NEG)  


 


Urine Urobilinogen Dipstick


  


  


  0.2mg/dL (0.2


mg/dL) 


 


 


Urine Leukocyte Esterase   Large (NEG)  


 


Urine RBC   Fobs/HPF (0-2)  


 


Urine WBC   Tntc/HPF (0-4)  


 


Urine Bacteria


  


  


  Many/HPF


(0-FEW) 


 


 


White Blood Count


  


  


  


  7.0x10^3/uL


(4.0-11.0)


 


Red Blood Count


  


  


  


  2.20x10^6/uL


(3.50-5.40)


 


Hemoglobin


  


  


  


  6.7g/dL


(12.0-15.5)


 


Hematocrit


  


  


  


  20.6%


(36.0-47.0)


 


Mean Corpuscular Volume    94fL () 


 


Mean Corpuscular Hemoglobin    31pg (25-35) 


 


Mean Corpuscular Hemoglobin


Concent 


  


  


  33g/dL (31-37) 


 


 


Red Cell Distribution Width


  


  


  


  14.6%


(11.5-14.5)


 


Platelet Count


  


  


  


  174x10^3/uL


(140-400)


 


Neutrophils (%) (Auto)    61% (31-73) 


 


Lymphocytes (%) (Auto)    25% (24-48) 


 


Monocytes (%) (Auto)    10% (0-9) 


 


Eosinophils (%) (Auto)    4% (0-3) 


 


Basophils (%) (Auto)    1% (0-3) 


 


Neutrophils # (Auto)


  


  


  


  4.2x10^3uL


(1.8-7.7)


 


Lymphocytes # (Auto)


  


  


  


  1.7x10^3/uL


(1.0-4.8)


 


Monocytes # (Auto)


  


  


  


  0.7x10^3/uL


(0.0-1.1)


 


Eosinophils # (Auto)


  


  


  


  0.3x10^3/uL


(0.0-0.7)


 


Basophils # (Auto)


  


  


  


  0.1x10^3/uL


(0.0-0.2)


 


Sodium Level


  


  


  


  138mmol/L


(136-145)


 


Potassium Level


  


  


  


  4.4mmol/L


(3.5-5.1)


 


Chloride Level


  


  


  


  104mmol/L


()


 


Carbon Dioxide Level


  


  


  


  26mmol/L


(21-32)


 


Anion Gap    8 (6-14) 


 


Blood Urea Nitrogen    29mg/dL (7-20) 


 


Creatinine


  


  


  


  3.9mg/dL


(0.6-1.0)


 


Estimated GFR


(Cockcroft-Gault) 


  


  


  11.8 


 


 


Glucose Level


  


  


  


  151mg/dL


(70-99)


 


Calcium Level


  


  


  


  8.0mg/dL


(8.5-10.1)


 


Phosphorus Level


  


  


  


  4.3mg/dL


(2.6-4.7)


 


Magnesium Level


  


  


  


  2.1mg/dL


(1.8-2.4)


 


Albumin


  


  


  


  2.2g/dL


(3.4-5.0)














Test


  2/12/17


07:57 2/12/17


11:53 


  


 


 


Glucose (Fingerstick)


  141mg/dL


(70-99) 246mg/dL


(70-99) 


  


 











Assessment and Plan


Assessmemt and Plan


 Problems


Medical Problems:


(1) Congestive heart failure


Status: Acute  





(2) NSTEMI (non-ST elevated myocardial infarction)


Status: Acute  








Problems:  





Comment


Review of Relevant


I have reviewed the following items lore (where applicable) has been applied.


Labs





Laboratory Tests








Test


  2/10/17


16:57 2/10/17


21:14 2/11/17


04:25 2/11/17


07:46


 


Glucose (Fingerstick)


  186mg/dL


(70-99) 178mg/dL


(70-99) 


  131mg/dL


(70-99)


 


White Blood Count


  


  


  6.4x10^3/uL


(4.0-11.0) 


 


 


Red Blood Count


  


  


  2.22x10^6/uL


(3.50-5.40) 


 


 


Hemoglobin


  


  


  6.9g/dL


(12.0-15.5) 


 


 


Hematocrit


  


  


  20.4%


(36.0-47.0) 


 


 


Mean Corpuscular Volume   92fL ()  


 


Mean Corpuscular Hemoglobin   31pg (25-35)  


 


Mean Corpuscular Hemoglobin


Concent 


  


  34g/dL (31-37) 


  


 


 


Red Cell Distribution Width


  


  


  14.8%


(11.5-14.5) 


 


 


Platelet Count


  


  


  154x10^3/uL


(140-400) 


 


 


Neutrophils (%) (Auto)   64% (31-73)  


 


Lymphocytes (%) (Auto)   20% (24-48)  


 


Monocytes (%) (Auto)   11% (0-9)  


 


Eosinophils (%) (Auto)   4% (0-3)  


 


Basophils (%) (Auto)   1% (0-3)  


 


Neutrophils # (Auto)


  


  


  4.1x10^3uL


(1.8-7.7) 


 


 


Lymphocytes # (Auto)


  


  


  1.3x10^3/uL


(1.0-4.8) 


 


 


Monocytes # (Auto)


  


  


  0.7x10^3/uL


(0.0-1.1) 


 


 


Eosinophils # (Auto)


  


  


  0.3x10^3/uL


(0.0-0.7) 


 


 


Basophils # (Auto)


  


  


  0.0x10^3/uL


(0.0-0.2) 


 


 


Sodium Level


  


  


  138mmol/L


(136-145) 


 


 


Potassium Level


  


  


  4.2mmol/L


(3.5-5.1) 


 


 


Chloride Level


  


  


  104mmol/L


() 


 


 


Carbon Dioxide Level


  


  


  28mmol/L


(21-32) 


 


 


Anion Gap   6 (6-14)  


 


Blood Urea Nitrogen   16mg/dL (7-20)  


 


Creatinine


  


  


  2.7mg/dL


(0.6-1.0) 


 


 


Estimated GFR


(Cockcroft-Gault) 


  


  18.0 


  


 


 


Glucose Level


  


  


  160mg/dL


(70-99) 


 


 


Calcium Level


  


  


  7.8mg/dL


(8.5-10.1) 


 


 


Phosphorus Level


  


  


  3.6mg/dL


(2.6-4.7) 


 


 


Magnesium Level


  


  


  1.9mg/dL


(1.8-2.4) 


 


 


Albumin


  


  


  2.1g/dL


(3.4-5.0) 


 














Test


  2/11/17


11:05 2/11/17


16:34 2/11/17


20:40 2/12/17


00:45


 


Glucose (Fingerstick)


  261mg/dL


(70-99) 204mg/dL


(70-99) 243mg/dL


(70-99) 


 


 


Urine Collection Type    Unknown 


 


Urine Color    Yellow 


 


Urine Clarity    Turbid 


 


Urine pH    6.0 


 


Urine Specific Gravity    1.015 


 


Urine Protein


  


  


  


  >=300mg/dL


(NEG-TRACE)


 


Urine Glucose (UA)


  


  


  


  Negativemg/dL


(NEG)


 


Urine Ketones (Stick)


  


  


  


  Negativemg/dL


(NEG)


 


Urine Blood    Moderate (NEG) 


 


Urine Nitrite    Positive (NEG) 


 


Urine Bilirubin    Negative (NEG) 


 


Urine Urobilinogen Dipstick


  


  


  


  0.2mg/dL (0.2


mg/dL)


 


Urine Leukocyte Esterase    Large (NEG) 


 


Urine RBC    Fobs/HPF (0-2) 


 


Urine WBC    Tntc/HPF (0-4) 


 


Urine Bacteria


  


  


  


  Many/HPF


(0-FEW)














Test


  2/12/17


05:00 2/12/17


07:57 2/12/17


11:53 


 


 


White Blood Count


  7.0x10^3/uL


(4.0-11.0) 


  


  


 


 


Red Blood Count


  2.20x10^6/uL


(3.50-5.40) 


  


  


 


 


Hemoglobin


  6.7g/dL


(12.0-15.5) 


  


  


 


 


Hematocrit


  20.6%


(36.0-47.0) 


  


  


 


 


Mean Corpuscular Volume 94fL ()    


 


Mean Corpuscular Hemoglobin 31pg (25-35)    


 


Mean Corpuscular Hemoglobin


Concent 33g/dL (31-37) 


  


  


  


 


 


Red Cell Distribution Width


  14.6%


(11.5-14.5) 


  


  


 


 


Platelet Count


  174x10^3/uL


(140-400) 


  


  


 


 


Neutrophils (%) (Auto) 61% (31-73)    


 


Lymphocytes (%) (Auto) 25% (24-48)    


 


Monocytes (%) (Auto) 10% (0-9)    


 


Eosinophils (%) (Auto) 4% (0-3)    


 


Basophils (%) (Auto) 1% (0-3)    


 


Neutrophils # (Auto)


  4.2x10^3uL


(1.8-7.7) 


  


  


 


 


Lymphocytes # (Auto)


  1.7x10^3/uL


(1.0-4.8) 


  


  


 


 


Monocytes # (Auto)


  0.7x10^3/uL


(0.0-1.1) 


  


  


 


 


Eosinophils # (Auto)


  0.3x10^3/uL


(0.0-0.7) 


  


  


 


 


Basophils # (Auto)


  0.1x10^3/uL


(0.0-0.2) 


  


  


 


 


Sodium Level


  138mmol/L


(136-145) 


  


  


 


 


Potassium Level


  4.4mmol/L


(3.5-5.1) 


  


  


 


 


Chloride Level


  104mmol/L


() 


  


  


 


 


Carbon Dioxide Level


  26mmol/L


(21-32) 


  


  


 


 


Anion Gap 8 (6-14)    


 


Blood Urea Nitrogen 29mg/dL (7-20)    


 


Creatinine


  3.9mg/dL


(0.6-1.0) 


  


  


 


 


Estimated GFR


(Cockcroft-Gault) 11.8 


  


  


  


 


 


Glucose Level


  151mg/dL


(70-99) 


  


  


 


 


Calcium Level


  8.0mg/dL


(8.5-10.1) 


  


  


 


 


Phosphorus Level


  4.3mg/dL


(2.6-4.7) 


  


  


 


 


Magnesium Level


  2.1mg/dL


(1.8-2.4) 


  


  


 


 


Albumin


  2.2g/dL


(3.4-5.0) 


  


  


 


 


Glucose (Fingerstick)


  


  141mg/dL


(70-99) 246mg/dL


(70-99) 


 








Laboratory Tests








Test


  2/11/17


16:34 2/11/17


20:40 2/12/17


00:45 2/12/17


05:00


 


Glucose (Fingerstick)


  204mg/dL


(70-99) 243mg/dL


(70-99) 


  


 


 


Urine Collection Type   Unknown  


 


Urine Color   Yellow  


 


Urine Clarity   Turbid  


 


Urine pH   6.0  


 


Urine Specific Gravity   1.015  


 


Urine Protein


  


  


  >=300mg/dL


(NEG-TRACE) 


 


 


Urine Glucose (UA)


  


  


  Negativemg/dL


(NEG) 


 


 


Urine Ketones (Stick)


  


  


  Negativemg/dL


(NEG) 


 


 


Urine Blood   Moderate (NEG)  


 


Urine Nitrite   Positive (NEG)  


 


Urine Bilirubin   Negative (NEG)  


 


Urine Urobilinogen Dipstick


  


  


  0.2mg/dL (0.2


mg/dL) 


 


 


Urine Leukocyte Esterase   Large (NEG)  


 


Urine RBC   Fobs/HPF (0-2)  


 


Urine WBC   Tntc/HPF (0-4)  


 


Urine Bacteria


  


  


  Many/HPF


(0-FEW) 


 


 


White Blood Count


  


  


  


  7.0x10^3/uL


(4.0-11.0)


 


Red Blood Count


  


  


  


  2.20x10^6/uL


(3.50-5.40)


 


Hemoglobin


  


  


  


  6.7g/dL


(12.0-15.5)


 


Hematocrit


  


  


  


  20.6%


(36.0-47.0)


 


Mean Corpuscular Volume    94fL () 


 


Mean Corpuscular Hemoglobin    31pg (25-35) 


 


Mean Corpuscular Hemoglobin


Concent 


  


  


  33g/dL (31-37) 


 


 


Red Cell Distribution Width


  


  


  


  14.6%


(11.5-14.5)


 


Platelet Count


  


  


  


  174x10^3/uL


(140-400)


 


Neutrophils (%) (Auto)    61% (31-73) 


 


Lymphocytes (%) (Auto)    25% (24-48) 


 


Monocytes (%) (Auto)    10% (0-9) 


 


Eosinophils (%) (Auto)    4% (0-3) 


 


Basophils (%) (Auto)    1% (0-3) 


 


Neutrophils # (Auto)


  


  


  


  4.2x10^3uL


(1.8-7.7)


 


Lymphocytes # (Auto)


  


  


  


  1.7x10^3/uL


(1.0-4.8)


 


Monocytes # (Auto)


  


  


  


  0.7x10^3/uL


(0.0-1.1)


 


Eosinophils # (Auto)


  


  


  


  0.3x10^3/uL


(0.0-0.7)


 


Basophils # (Auto)


  


  


  


  0.1x10^3/uL


(0.0-0.2)


 


Sodium Level


  


  


  


  138mmol/L


(136-145)


 


Potassium Level


  


  


  


  4.4mmol/L


(3.5-5.1)


 


Chloride Level


  


  


  


  104mmol/L


()


 


Carbon Dioxide Level


  


  


  


  26mmol/L


(21-32)


 


Anion Gap    8 (6-14) 


 


Blood Urea Nitrogen    29mg/dL (7-20) 


 


Creatinine


  


  


  


  3.9mg/dL


(0.6-1.0)


 


Estimated GFR


(Cockcroft-Gault) 


  


  


  11.8 


 


 


Glucose Level


  


  


  


  151mg/dL


(70-99)


 


Calcium Level


  


  


  


  8.0mg/dL


(8.5-10.1)


 


Phosphorus Level


  


  


  


  4.3mg/dL


(2.6-4.7)


 


Magnesium Level


  


  


  


  2.1mg/dL


(1.8-2.4)


 


Albumin


  


  


  


  2.2g/dL


(3.4-5.0)














Test


  2/12/17


07:57 2/12/17


11:53 


  


 


 


Glucose (Fingerstick)


  141mg/dL


(70-99) 246mg/dL


(70-99) 


  


 








Microbiology


2/10/17 Blood Culture - Preliminary, Resulted


          NO GROWTH AFTER 2 DAYS


Medications





 Current Medications


Aspirin 324 mg 324 mg 1X  ONCE PO  Last administered on 2/7/17at 12:59;  Start 2 /7/17 at 12:45;  Stop 2/7/17 at 12:48;  Status DC


Heparin Sodium/ Dextrose 500 ml @  19 mls/hr CONT  PRN IV SEE I/O RECORD Last 

administered on 2/8/17at 14:45;  Start 2/7/17 at 12:45;  Stop 2/9/17 at 15:30;  

Status DC


Heparin Sodium (Porcine) 1,950 unit PRN Q6HRS  PRN IV FOR UFH LEVEL LESS THAN 

0.2 Last administered on 2/7/17at 13:01;  Start 2/7/17 at 12:45;  Stop 2/9/17 

at 15:30;  Status DC


Ondansetron HCl (Zofran) 4 mg PRN Q8HRS  PRN IV NAUSEA/VOMITING;  Start 2/7/17 

at 12:45;  Stop 2/8/17 at 12:44;  Status DC


Morphine Sulfate 4 mg PRN Q2HR  PRN IV PAIN Last administered on 2/7/17at 14:03

;  Start 2/7/17 at 12:45;  Stop 2/8/17 at 12:44;  Status DC


Nitroglycerin (Nitrostat) 0.4 mg PRN Q5MIN  PRN SL CHEST PAIN Last administered 

on 2/7/17at 14:03;  Start 2/7/17 at 12:45;  Stop 2/8/17 at 12:44;  Status DC


Amlodipine Besylate (Norvasc) 5 mg DAILYWLUN PO  Last administered on 2/8/17at 

08:32;  Start 2/7/17 at 14:00;  Stop 2/8/17 at 16:02;  Status DC


Aspirin (Ecotrin) 81 mg DAILYWBKFT PO  Last administered on 2/12/17at 09:22;  

Start 2/7/17 at 14:00


Atorvastatin Calcium (Lipitor) 80 mg QHS PO  Last administered on 2/11/17at 21:

14;  Start 2/7/17 at 21:00


Carvedilol (Coreg) 25 mg BIDWMEALS PO  Last administered on 2/12/17at 09:22;  

Start 2/7/17 at 17:00


Clopidogrel Bisulfate (Plavix) 75 mg DAILY PO  Last administered on 2/7/17at 14:

28;  Start 2/7/17 at 14:00;  Stop 2/8/17 at 08:41;  Status DC


Isosorbide Mononitrate (Imdur) 30 mg DAILY PO  Last administered on 2/12/17at 09

:21;  Start 2/7/17 at 14:00


Furosemide (Lasix) 40 mg 1X  ONCE IVP  Last administered on 2/7/17at 14:30;  

Start 2/7/17 at 14:00;  Stop 2/7/17 at 14:23;  Status DC


Furosemide 40 mg 40 mg 1X  ONCE IVP  Last administered on 2/7/17at 14:30;  

Start 2/7/17 at 14:30;  Stop 2/7/17 at 14:38;  Status DC


Nitroglycerin/ Dextrose (Nitroglycerin Drip) 250 ml @ 0 mls/hr CONT  PRN IV SEE 

I/O RECORD Last administered on 2/7/17at 15:23;  Start 2/7/17 at 14:30;  Stop 2/ 8/17 at 16:02;  Status DC


Acetaminophen (Tylenol) 325 mg PRN Q6HRS  PRN PO MILD PAIN / TEMP;  Start 2/7/ 17 at 15:00


Acetaminophen/ Hydrocodone Bitart (Lortab 5/325) 1 tab PRN Q6HRS  PRN PO 

MODERATE TO SEVERE PAIN;  Start 2/7/17 at 15:00


Hydralazine HCl (Apresoline) 10 mg PRN Q4HRS  PRN IVP ELEVATED BP, SEE COMMENTS

;  Start 2/7/17 at 15:00


Ondansetron HCl (Zofran) 4 mg PRN Q8HRS  PRN IV NAUSEA/VOMITING;  Start 2/7/17 

at 15:00


Albuterol Sulfate (Ventolin Neb Soln) 2.5 mg PRN Q4HRS  PRN NEB SHORTNESS OF 

BREATH;  Start 2/7/17 at 15:00


Insulin Aspart (Novolog) 0-7 UNITS TIDWMEALS SQ  Last administered on 2/12/17at 

12:37;  Start 2/7/17 at 17:00


Dextrose 12.5 gm PRN Q15MIN  PRN IV SEE COMMENTS;  Start 2/7/17 at 15:00


Info 1 each 1 each PRN DAILY  PRN MC SEE COMMENTS Last administered on 2/9/17at 

09:38;  Start 2/7/17 at 17:00;  Stop 2/10/17 at 08:25;  Status DC


Magnesium Sulfate/ Dextrose (Magnesium Sulfate PREMIX 2GM) 50 ml @ 25 mls/hr 

PRN DAILY  PRN IV for Mag < 1.7 on am labs;  Start 2/8/17 at 11:15


Lidocaine/Sodium Bicarbonate (Buffered Lidocaine 1%) 3 ml 1X  ONCE IJ  Last 

administered on 2/8/17at 14:03;  Start 2/8/17 at 12:30;  Stop 2/8/17 at 12:34;  

Status DC


Heparin Sodium (Porcine) 2,400 unit 1X  ONCE INT CAT  Last administered on 2/8/ 17at 14:04;  Start 2/8/17 at 12:30;  Stop 2/8/17 at 12:34;  Status DC


Heparin Sodium/ Sodium Chloride 60 unit 1X  ONCE IV  Last administered on 2/8/ 17at 14:04;  Start 2/8/17 at 12:30;  Stop 2/8/17 at 12:34;  Status DC


Lisinopril (Prinivil) 20 mg DAILY PO  Last administered on 2/12/17at 09:22;  

Start 2/8/17 at 16:00


Hydralazine HCl 50 mg 50 mg BID PO  Last administered on 2/12/17at 09:21;  

Start 2/8/17 at 21:00


Sodium Chloride (Iv Sodium Chloride 0.9% 1000ml Bag) 1,000 ml @  1,000 mls/hr 

Q1H PRN IV hypotension;  Start 2/8/17 at 18:28;  Stop 2/9/17 at 00:27;  Status 

DC


Diphenhydramine HCl (Benadryl) 25 mg 1X PRN  PRN IV ITCHING;  Start 2/8/17 at 18

:30;  Stop 2/9/17 at 18:29;  Status DC


Diphenhydramine HCl (Benadryl) 25 mg 1X PRN  PRN IV ITCHING;  Start 2/8/17 at 18

:30;  Stop 2/9/17 at 18:29;  Status DC


Sodium Chloride (Normal Saline Flush) 10 ml 1X PRN  PRN IV AP catheter pack;  

Start 2/8/17 at 18:30;  Stop 2/9/17 at 18:29;  Status DC


Sodium Chloride 10 ml 10 ml 1X PRN  PRN IV  catheter pack;  Start 2/8/17 at 18

:30;  Stop 2/9/17 at 18:29;  Status DC


Sodium Chloride (Iv Sodium Chloride 0.9% 1000ml Bag) 1,000 ml @  400 mls/hr 

Q2H30M PRN IV PATENCY;  Start 2/8/17 at 18:28;  Stop 2/9/17 at 06:27;  Status DC


Info (PHARMACY MONITORING -- do not chart) 1 each PRN DAILY  PRN MC SEE COMMENTS

;  Start 2/8/17 at 18:30


Info (PHARMACY MONITORING -- do not chart) 1 each PRN DAILY  PRN MC SEE COMMENTS

;  Start 2/9/17 at 10:30;  Status UNV


Info (PHARMACY MONITORING -- do not chart) 1 each PRN DAILY  PRN MC SEE COMMENTS

;  Start 2/9/17 at 10:30;  Status Cancel


Darbepoetin Valeriano (Aranesp) 60 mcg WEEKLYHS SQ  Last administered on 2/9/17at 20:

48;  Start 2/9/17 at 21:00


Clopidogrel Bisulfate (Plavix) 75 mg DAILYWBKFT PO ;  Start 2/10/17 at 08:00;  

Stop 2/10/17 at 08:00;  Status DC


Clopidogrel Bisulfate 75 mg 75 mg DAILYWBKFT PO  Last administered on 2/10/17at 

08:25;  Start 2/9/17 at 15:30;  Stop 2/11/17 at 18:54;  Status DC


Sodium Chloride (Iv Sodium Chloride 0.9% 1000ml Bag) 1,000 ml @  1,000 mls/hr 

Q1H PRN IV hypotension;  Start 2/10/17 at 08:11;  Stop 2/10/17 at 15:00;  

Status DC


Diphenhydramine HCl (Benadryl) 25 mg 1X PRN  PRN IV ITCHING;  Start 2/10/17 at 

08:15;  Stop 2/10/17 at 15:00;  Status DC


Diphenhydramine HCl (Benadryl) 25 mg 1X PRN  PRN IV ITCHING;  Start 2/10/17 at 

08:15;  Stop 2/10/17 at 15:00;  Status DC


Sodium Chloride (Normal Saline Flush) 10 ml 1X PRN  PRN IV AP catheter pack;  

Start 2/10/17 at 08:15;  Stop 2/10/17 at 15:00;  Status DC


Sodium Chloride 10 ml 10 ml 1X PRN  PRN IV  catheter pack;  Start 2/10/17 at 

08:15;  Stop 2/10/17 at 15:00;  Status DC


Sodium Chloride (Iv Sodium Chloride 0.9% 1000ml Bag) 1,000 ml @  400 mls/hr 

Q2H30M PRN IV PATENCY;  Start 2/10/17 at 08:11;  Stop 2/10/17 at 21:00;  Status 

DC


Info 1 each 1 each PRN DAILY  PRN MC SEE COMMENTS;  Start 2/10/17 at 08:15;  

Status UNV


Magnesium Sulfate/ Dextrose (Magnesium Sulfate PREMIX 1GM) 100 ml @  100 mls/hr 

1X  ONCE IV  Last administered on 2/10/17at 12:36;  Start 2/10/17 at 10:30;  

Stop 2/10/17 at 11:29;  Status DC





Active Scripts


Active


Lisinopril 20 Mg Tablet 20 Mg PO QHS


Isosorbide Mononitrate Er (Isosorbide Mononitrate) 30 Mg Tab.er.24h 30 Mg PO 

DAILY


Hydralazine Hcl 50 Mg Tablet 100 Mg PO TID


Carvedilol 12.5 Mg Tablet 25 Mg PO BIDWMEALS


Atorvastatin Calcium 40 Mg Tablet 80 Mg PO QHS


Aspirin Ec (Aspirin) 81 Mg Tablet.dr 81 Mg PO DAILYWBKFT


Amlodipine Besylate 5 Mg Tablet 5 Mg PO DAILYWLUN


Reported


Clopidogrel (Clopidogrel Bisulfate) 75 Mg Tablet 75 Mg PO DAILY


Glyburide 5 Mg Tablet 1 Tab PO BIDWMEALS


Lisinopril 20 Mg Tablet 1 Tab PO DAILY


Vitals/I & O





 Vital Sign - Last 24 Hours








 2/11/17 2/11/17 2/11/17 2/11/17





 15:00 18:37 19:20 20:00


 


Temp 98.7  98.8 





 98.7  98.8 


 


Pulse 73 73 79 


 


Resp 18  21 


 


B/P 151/68 151/68 163/72 


 


Pulse Ox 99  99 


 


O2 Delivery Room Air  Room Air Room Air


 


    





    





 2/11/17 2/11/17 2/12/17 2/12/17





 21:14 23:24 03:15 07:40


 


Temp  98.5 98.6 98.7





  98.5 98.6 98.7


 


Pulse 72 73 74 70


 


Resp  20 22 18


 


B/P 163/72 146/67 155/71 141/60


 


Pulse Ox  98 97 97


 


O2 Delivery  Room Air Room Air Room Air


 


    





    





 2/12/17 2/12/17 2/12/17 2/12/17





 08:00 09:21 09:21 09:22


 


Pulse  70 70 70


 


B/P  141/60 141/60 141/60


 


O2 Delivery Room Air   





 2/12/17 2/12/17 2/12/17 





 09:22 10:45 14:30 


 


Temp  98.3 97.9 





  98.3 97.9 


 


Pulse 70 79 75 


 


Resp  20 20 


 


B/P 141/60 155/63 134/54 


 


Pulse Ox  97 100 


 


O2 Delivery  Room Air Room Air 














 Intake and Output   


 


 2/11/17 2/11/17 2/12/17





 15:00 23:00 07:00


 


Intake Total 400 ml 750 ml 200 ml


 


Output Total  300 ml 150 ml


 


Balance 400 ml 450 ml 50 ml














OSMEL VALDEZ MD Feb 12, 2017 15:01

## 2017-02-13 NOTE — PDOC
Dialysis Progress Note


Dialysis Note


Dialysis Note


Seen on Hemodialysis, tolerating treatment        Okay     so far





Vitals on Hemodialysis at time of my visit:  171/78      76   16   afeb





 





 


General Appearance:          


Awake:          


Alert Oriented  x      2-3 


Neck:    No JVD or JVP


Chest:    CTA Roberto


Heart:    S1 S2


Abdomen -    Soft NTND


Extremities -    No Edema











ESRD:   Dialysis as below





F 180 NR  3.0  Hrs





3 K 2.5 Ca 140 Na  40    HC03





Qb 350 + Qd 500+





Heparin   on gtt Units





Uf none Kgs or to dry weight as tolerated





Pt refused Transfusion of PRCBC's previously 





May give 25-50 gms of 25% Albumin or NS if needed to maintain Hemodynamic 

stability





Treatment plan reviewed and discussed with Dialysis RN





Vitals


Vital Signs





 Vital Signs








  Date Time  Temp Pulse Resp B/P Pulse Ox O2 Delivery O2 Flow Rate FiO2


 


2/13/17 07:45      Room Air  


 


2/13/17 07:00 98.5 71 18 158/56 98   





 98.5       


 


2/10/17 14:25       2.0 











Labs


Last Labs





Laboratory Tests








Test


  2/11/17


11:05 2/11/17


16:34 2/11/17


20:40 2/12/17


00:45


 


Glucose (Fingerstick)


  261mg/dL


(70-99) 204mg/dL


(70-99) 243mg/dL


(70-99) 


 


 


Urine Collection Type    Unknown 


 


Urine Color    Yellow 


 


Urine Clarity    Turbid 


 


Urine pH    6.0 


 


Urine Specific Gravity    1.015 


 


Urine Protein


  


  


  


  >=300mg/dL


(NEG-TRACE)


 


Urine Glucose (UA)


  


  


  


  Negativemg/dL


(NEG)


 


Urine Ketones (Stick)


  


  


  


  Negativemg/dL


(NEG)


 


Urine Blood    Moderate (NEG) 


 


Urine Nitrite    Positive (NEG) 


 


Urine Bilirubin    Negative (NEG) 


 


Urine Urobilinogen Dipstick


  


  


  


  0.2mg/dL (0.2


mg/dL)


 


Urine Leukocyte Esterase    Large (NEG) 


 


Urine RBC    Fobs/HPF (0-2) 


 


Urine WBC    Tntc/HPF (0-4) 


 


Urine Bacteria


  


  


  


  Many/HPF


(0-FEW)














Test


  2/12/17


05:00 2/12/17


07:57 2/12/17


11:53 2/12/17


16:54


 


White Blood Count


  7.0x10^3/uL


(4.0-11.0) 


  


  


 


 


Red Blood Count


  2.20x10^6/uL


(3.50-5.40) 


  


  


 


 


Hemoglobin


  6.7g/dL


(12.0-15.5) 


  


  


 


 


Hematocrit


  20.6%


(36.0-47.0) 


  


  


 


 


Mean Corpuscular Volume 94fL ()    


 


Mean Corpuscular Hemoglobin 31pg (25-35)    


 


Mean Corpuscular Hemoglobin


Concent 33g/dL (31-37) 


  


  


  


 


 


Red Cell Distribution Width


  14.6%


(11.5-14.5) 


  


  


 


 


Platelet Count


  174x10^3/uL


(140-400) 


  


  


 


 


Neutrophils (%) (Auto) 61% (31-73)    


 


Lymphocytes (%) (Auto) 25% (24-48)    


 


Monocytes (%) (Auto) 10% (0-9)    


 


Eosinophils (%) (Auto) 4% (0-3)    


 


Basophils (%) (Auto) 1% (0-3)    


 


Neutrophils # (Auto)


  4.2x10^3uL


(1.8-7.7) 


  


  


 


 


Lymphocytes # (Auto)


  1.7x10^3/uL


(1.0-4.8) 


  


  


 


 


Monocytes # (Auto)


  0.7x10^3/uL


(0.0-1.1) 


  


  


 


 


Eosinophils # (Auto)


  0.3x10^3/uL


(0.0-0.7) 


  


  


 


 


Basophils # (Auto)


  0.1x10^3/uL


(0.0-0.2) 


  


  


 


 


Sodium Level


  138mmol/L


(136-145) 


  


  


 


 


Potassium Level


  4.4mmol/L


(3.5-5.1) 


  


  


 


 


Chloride Level


  104mmol/L


() 


  


  


 


 


Carbon Dioxide Level


  26mmol/L


(21-32) 


  


  


 


 


Anion Gap 8 (6-14)    


 


Blood Urea Nitrogen 29mg/dL (7-20)    


 


Creatinine


  3.9mg/dL


(0.6-1.0) 


  


  


 


 


Estimated GFR


(Cockcroft-Gault) 11.8 


  


  


  


 


 


Glucose Level


  151mg/dL


(70-99) 


  


  


 


 


Calcium Level


  8.0mg/dL


(8.5-10.1) 


  


  


 


 


Phosphorus Level


  4.3mg/dL


(2.6-4.7) 


  


  


 


 


Magnesium Level


  2.1mg/dL


(1.8-2.4) 


  


  


 


 


Albumin


  2.2g/dL


(3.4-5.0) 


  


  


 


 


Glucose (Fingerstick)


  


  141mg/dL


(70-99) 246mg/dL


(70-99) 188mg/dL


(70-99)














Test


  2/12/17


20:38 2/13/17


05:10 2/13/17


07:32 


 


 


Glucose (Fingerstick)


  158mg/dL


(70-99) 


  151mg/dL


(70-99) 


 


 


White Blood Count


  


  6.8x10^3/uL


(4.0-11.0) 


  


 


 


Red Blood Count


  


  2.14x10^6/uL


(3.50-5.40) 


  


 


 


Hemoglobin


  


  6.8g/dL


(12.0-15.5) 


  


 


 


Hematocrit


  


  20.2%


(36.0-47.0) 


  


 


 


Mean Corpuscular Volume  94fL ()   


 


Mean Corpuscular Hemoglobin  32pg (25-35)   


 


Mean Corpuscular Hemoglobin


Concent 


  34g/dL (31-37) 


  


  


 


 


Red Cell Distribution Width


  


  14.6%


(11.5-14.5) 


  


 


 


Platelet Count


  


  166x10^3/uL


(140-400) 


  


 


 


Neutrophils (%) (Auto)  61% (31-73)   


 


Lymphocytes (%) (Auto)  25% (24-48)   


 


Monocytes (%) (Auto)  10% (0-9)   


 


Eosinophils (%) (Auto)  4% (0-3)   


 


Basophils (%) (Auto)  1% (0-3)   


 


Neutrophils # (Auto)


  


  4.1x10^3uL


(1.8-7.7) 


  


 


 


Lymphocytes # (Auto)


  


  1.7x10^3/uL


(1.0-4.8) 


  


 


 


Monocytes # (Auto)


  


  0.7x10^3/uL


(0.0-1.1) 


  


 


 


Eosinophils # (Auto)


  


  0.2x10^3/uL


(0.0-0.7) 


  


 


 


Basophils # (Auto)


  


  0.1x10^3/uL


(0.0-0.2) 


  


 


 


Sodium Level


  


  140mmol/L


(136-145) 


  


 


 


Potassium Level


  


  4.5mmol/L


(3.5-5.1) 


  


 


 


Chloride Level


  


  105mmol/L


() 


  


 


 


Carbon Dioxide Level


  


  24mmol/L


(21-32) 


  


 


 


Anion Gap  11 (6-14)   


 


Blood Urea Nitrogen  41mg/dL (7-20)   


 


Creatinine


  


  4.6mg/dL


(0.6-1.0) 


  


 


 


Estimated GFR


(Cockcroft-Gault) 


  9.8 


  


  


 


 


Glucose Level


  


  161mg/dL


(70-99) 


  


 


 


Calcium Level


  


  7.6mg/dL


(8.5-10.1) 


  


 


 


Phosphorus Level


  


  4.9mg/dL


(2.6-4.7) 


  


 


 


Magnesium Level


  


  2.1mg/dL


(1.8-2.4) 


  


 


 


Albumin


  


  2.0g/dL


(3.4-5.0) 


  


 








Laboratory Tests








Test


  2/12/17


11:53 2/12/17


16:54 2/12/17


20:38 2/13/17


05:10


 


Glucose (Fingerstick)


  246mg/dL


(70-99) 188mg/dL


(70-99) 158mg/dL


(70-99) 


 


 


White Blood Count


  


  


  


  6.8x10^3/uL


(4.0-11.0)


 


Red Blood Count


  


  


  


  2.14x10^6/uL


(3.50-5.40)


 


Hemoglobin


  


  


  


  6.8g/dL


(12.0-15.5)


 


Hematocrit


  


  


  


  20.2%


(36.0-47.0)


 


Mean Corpuscular Volume    94fL () 


 


Mean Corpuscular Hemoglobin    32pg (25-35) 


 


Mean Corpuscular Hemoglobin


Concent 


  


  


  34g/dL (31-37) 


 


 


Red Cell Distribution Width


  


  


  


  14.6%


(11.5-14.5)


 


Platelet Count


  


  


  


  166x10^3/uL


(140-400)


 


Neutrophils (%) (Auto)    61% (31-73) 


 


Lymphocytes (%) (Auto)    25% (24-48) 


 


Monocytes (%) (Auto)    10% (0-9) 


 


Eosinophils (%) (Auto)    4% (0-3) 


 


Basophils (%) (Auto)    1% (0-3) 


 


Neutrophils # (Auto)


  


  


  


  4.1x10^3uL


(1.8-7.7)


 


Lymphocytes # (Auto)


  


  


  


  1.7x10^3/uL


(1.0-4.8)


 


Monocytes # (Auto)


  


  


  


  0.7x10^3/uL


(0.0-1.1)


 


Eosinophils # (Auto)


  


  


  


  0.2x10^3/uL


(0.0-0.7)


 


Basophils # (Auto)


  


  


  


  0.1x10^3/uL


(0.0-0.2)


 


Sodium Level


  


  


  


  140mmol/L


(136-145)


 


Potassium Level


  


  


  


  4.5mmol/L


(3.5-5.1)


 


Chloride Level


  


  


  


  105mmol/L


()


 


Carbon Dioxide Level


  


  


  


  24mmol/L


(21-32)


 


Anion Gap    11 (6-14) 


 


Blood Urea Nitrogen    41mg/dL (7-20) 


 


Creatinine


  


  


  


  4.6mg/dL


(0.6-1.0)


 


Estimated GFR


(Cockcroft-Gault) 


  


  


  9.8 


 


 


Glucose Level


  


  


  


  161mg/dL


(70-99)


 


Calcium Level


  


  


  


  7.6mg/dL


(8.5-10.1)


 


Phosphorus Level


  


  


  


  4.9mg/dL


(2.6-4.7)


 


Magnesium Level


  


  


  


  2.1mg/dL


(1.8-2.4)


 


Albumin


  


  


  


  2.0g/dL


(3.4-5.0)














Test


  2/13/17


07:32 


  


  


 


 


Glucose (Fingerstick)


  151mg/dL


(70-99) 


  


  


 











Assessment


Assessment


 Problems


Medical Problems:


(1) Congestive heart failure


Status: Acute  





(2) NSTEMI (non-ST elevated myocardial infarction)


Status: Acute  








Problems:  





Plan


Plan of Care


 Problems


Medical Problems:


(1) Congestive heart failure


Status: Acute  





(2) NSTEMI (non-ST elevated myocardial infarction)


Status: Acute  











RENAN DODD MD Feb 13, 2017 10:31

## 2017-02-13 NOTE — PDOC
PROGRESS NOTES


Chief Complaint


Chief Complaint


cc: sob 





A//P


1.  Acute on chronic systolic congestive heart failure. improved 


2.  Non-ST-segment elevation myocardial infarction.


3.  WALTER with CKD


4.  Ischemic cardiomyopathy.


5.  Malignant hypertension, Better


6.  Anemia


7.  Hyperlipidemia.


8.  Psoriasis.


9. CAD


10. Anemia


11. UTI not POA





Plan 


IV Rocephin, fu ucx


HD per  nephrology 


SW to arrange out pt HD


BP controlled, 


low hemoglobin but pt declined to have blood products due to Mu-ism reasons. 

risks and benefits explained. 


check vitb12, fa, add iron po


SSI 


Lipitor 


labs reviewed 


Monitor hemoglobin 


Prognosis guarded. 


talked to daughter at bedside





History of Present Illness


History of Present Illness


sob better


no fever


no chills. 


no chest pain 





still dysuria, frequent urination





Vitals


Vitals





 Vital Signs








  Date Time  Temp Pulse Resp B/P Pulse Ox O2 Delivery O2 Flow Rate FiO2


 


2/13/17 14:20 98.5 79 20 130/60 100 Room Air  





 98.5       











Physical Exam


General:  Alert, Oriented X3


Heart:  Regular rate, Normal S1, Normal S2, No murmurs, Gallops


Lungs:  Clear, Other


Abdomen:  Normal bowel sounds, Soft, No tenderness, No hepatosplenomegaly, No 

masses


Extremities:  No clubbing, No cyanosis, No edema, Normal pulses, No tenderness/

swelling


Skin:  No breakdown, No significant lesion, Other (psoriasis )





Labs


LABS





Laboratory Tests








Test


  2/12/17


16:54 2/12/17


20:38 2/13/17


05:10 2/13/17


07:32


 


Glucose (Fingerstick)


  188mg/dL


(70-99) 158mg/dL


(70-99) 


  151mg/dL


(70-99)


 


White Blood Count


  


  


  6.8x10^3/uL


(4.0-11.0) 


 


 


Red Blood Count


  


  


  2.14x10^6/uL


(3.50-5.40) 


 


 


Hemoglobin


  


  


  6.8g/dL


(12.0-15.5) 


 


 


Hematocrit


  


  


  20.2%


(36.0-47.0) 


 


 


Mean Corpuscular Volume   94fL ()  


 


Mean Corpuscular Hemoglobin   32pg (25-35)  


 


Mean Corpuscular Hemoglobin


Concent 


  


  34g/dL (31-37) 


  


 


 


Red Cell Distribution Width


  


  


  14.6%


(11.5-14.5) 


 


 


Platelet Count


  


  


  166x10^3/uL


(140-400) 


 


 


Neutrophils (%) (Auto)   61% (31-73)  


 


Lymphocytes (%) (Auto)   25% (24-48)  


 


Monocytes (%) (Auto)   10% (0-9)  


 


Eosinophils (%) (Auto)   4% (0-3)  


 


Basophils (%) (Auto)   1% (0-3)  


 


Neutrophils # (Auto)


  


  


  4.1x10^3uL


(1.8-7.7) 


 


 


Lymphocytes # (Auto)


  


  


  1.7x10^3/uL


(1.0-4.8) 


 


 


Monocytes # (Auto)


  


  


  0.7x10^3/uL


(0.0-1.1) 


 


 


Eosinophils # (Auto)


  


  


  0.2x10^3/uL


(0.0-0.7) 


 


 


Basophils # (Auto)


  


  


  0.1x10^3/uL


(0.0-0.2) 


 


 


Sodium Level


  


  


  140mmol/L


(136-145) 


 


 


Potassium Level


  


  


  4.5mmol/L


(3.5-5.1) 


 


 


Chloride Level


  


  


  105mmol/L


() 


 


 


Carbon Dioxide Level


  


  


  24mmol/L


(21-32) 


 


 


Anion Gap   11 (6-14)  


 


Blood Urea Nitrogen   41mg/dL (7-20)  


 


Creatinine


  


  


  4.6mg/dL


(0.6-1.0) 


 


 


Estimated GFR


(Cockcroft-Gault) 


  


  9.8 


  


 


 


Glucose Level


  


  


  161mg/dL


(70-99) 


 


 


Calcium Level


  


  


  7.6mg/dL


(8.5-10.1) 


 


 


Phosphorus Level


  


  


  4.9mg/dL


(2.6-4.7) 


 


 


Magnesium Level


  


  


  2.1mg/dL


(1.8-2.4) 


 


 


Albumin


  


  


  2.0g/dL


(3.4-5.0) 


 














Test


  2/13/17


13:05 


  


  


 


 


Glucose (Fingerstick)


  120mg/dL


(70-99) 


  


  


 











Review of Systems


Review of Systems


no fever, chills, sob or chest pain





Assessment and Plan


Assessmemt and Plan


 Problems


Medical Problems:


(1) Congestive heart failure


Status: Acute  





(2) NSTEMI (non-ST elevated myocardial infarction)


Status: Acute  








Problems:  





Comment


Review of Relevant


I have reviewed the following items lore (where applicable) has been applied.


Labs





Laboratory Tests








Test


  2/11/17


16:34 2/11/17


20:40 2/12/17


00:45 2/12/17


05:00


 


Glucose (Fingerstick)


  204mg/dL


(70-99) 243mg/dL


(70-99) 


  


 


 


Urine Collection Type   Unknown  


 


Urine Color   Yellow  


 


Urine Clarity   Turbid  


 


Urine pH   6.0  


 


Urine Specific Gravity   1.015  


 


Urine Protein


  


  


  >=300mg/dL


(NEG-TRACE) 


 


 


Urine Glucose (UA)


  


  


  Negativemg/dL


(NEG) 


 


 


Urine Ketones (Stick)


  


  


  Negativemg/dL


(NEG) 


 


 


Urine Blood   Moderate (NEG)  


 


Urine Nitrite   Positive (NEG)  


 


Urine Bilirubin   Negative (NEG)  


 


Urine Urobilinogen Dipstick


  


  


  0.2mg/dL (0.2


mg/dL) 


 


 


Urine Leukocyte Esterase   Large (NEG)  


 


Urine RBC   Fobs/HPF (0-2)  


 


Urine WBC   Tntc/HPF (0-4)  


 


Urine Bacteria


  


  


  Many/HPF


(0-FEW) 


 


 


White Blood Count


  


  


  


  7.0x10^3/uL


(4.0-11.0)


 


Red Blood Count


  


  


  


  2.20x10^6/uL


(3.50-5.40)


 


Hemoglobin


  


  


  


  6.7g/dL


(12.0-15.5)


 


Hematocrit


  


  


  


  20.6%


(36.0-47.0)


 


Mean Corpuscular Volume    94fL () 


 


Mean Corpuscular Hemoglobin    31pg (25-35) 


 


Mean Corpuscular Hemoglobin


Concent 


  


  


  33g/dL (31-37) 


 


 


Red Cell Distribution Width


  


  


  


  14.6%


(11.5-14.5)


 


Platelet Count


  


  


  


  174x10^3/uL


(140-400)


 


Neutrophils (%) (Auto)    61% (31-73) 


 


Lymphocytes (%) (Auto)    25% (24-48) 


 


Monocytes (%) (Auto)    10% (0-9) 


 


Eosinophils (%) (Auto)    4% (0-3) 


 


Basophils (%) (Auto)    1% (0-3) 


 


Neutrophils # (Auto)


  


  


  


  4.2x10^3uL


(1.8-7.7)


 


Lymphocytes # (Auto)


  


  


  


  1.7x10^3/uL


(1.0-4.8)


 


Monocytes # (Auto)


  


  


  


  0.7x10^3/uL


(0.0-1.1)


 


Eosinophils # (Auto)


  


  


  


  0.3x10^3/uL


(0.0-0.7)


 


Basophils # (Auto)


  


  


  


  0.1x10^3/uL


(0.0-0.2)


 


Sodium Level


  


  


  


  138mmol/L


(136-145)


 


Potassium Level


  


  


  


  4.4mmol/L


(3.5-5.1)


 


Chloride Level


  


  


  


  104mmol/L


()


 


Carbon Dioxide Level


  


  


  


  26mmol/L


(21-32)


 


Anion Gap    8 (6-14) 


 


Blood Urea Nitrogen    29mg/dL (7-20) 


 


Creatinine


  


  


  


  3.9mg/dL


(0.6-1.0)


 


Estimated GFR


(Cockcroft-Gault) 


  


  


  11.8 


 


 


Glucose Level


  


  


  


  151mg/dL


(70-99)


 


Calcium Level


  


  


  


  8.0mg/dL


(8.5-10.1)


 


Phosphorus Level


  


  


  


  4.3mg/dL


(2.6-4.7)


 


Magnesium Level


  


  


  


  2.1mg/dL


(1.8-2.4)


 


Albumin


  


  


  


  2.2g/dL


(3.4-5.0)














Test


  2/12/17


07:57 2/12/17


11:53 2/12/17


16:54 2/12/17


20:38


 


Glucose (Fingerstick)


  141mg/dL


(70-99) 246mg/dL


(70-99) 188mg/dL


(70-99) 158mg/dL


(70-99)














Test


  2/13/17


05:10 2/13/17


07:32 2/13/17


13:05 


 


 


White Blood Count


  6.8x10^3/uL


(4.0-11.0) 


  


  


 


 


Red Blood Count


  2.14x10^6/uL


(3.50-5.40) 


  


  


 


 


Hemoglobin


  6.8g/dL


(12.0-15.5) 


  


  


 


 


Hematocrit


  20.2%


(36.0-47.0) 


  


  


 


 


Mean Corpuscular Volume 94fL ()    


 


Mean Corpuscular Hemoglobin 32pg (25-35)    


 


Mean Corpuscular Hemoglobin


Concent 34g/dL (31-37) 


  


  


  


 


 


Red Cell Distribution Width


  14.6%


(11.5-14.5) 


  


  


 


 


Platelet Count


  166x10^3/uL


(140-400) 


  


  


 


 


Neutrophils (%) (Auto) 61% (31-73)    


 


Lymphocytes (%) (Auto) 25% (24-48)    


 


Monocytes (%) (Auto) 10% (0-9)    


 


Eosinophils (%) (Auto) 4% (0-3)    


 


Basophils (%) (Auto) 1% (0-3)    


 


Neutrophils # (Auto)


  4.1x10^3uL


(1.8-7.7) 


  


  


 


 


Lymphocytes # (Auto)


  1.7x10^3/uL


(1.0-4.8) 


  


  


 


 


Monocytes # (Auto)


  0.7x10^3/uL


(0.0-1.1) 


  


  


 


 


Eosinophils # (Auto)


  0.2x10^3/uL


(0.0-0.7) 


  


  


 


 


Basophils # (Auto)


  0.1x10^3/uL


(0.0-0.2) 


  


  


 


 


Sodium Level


  140mmol/L


(136-145) 


  


  


 


 


Potassium Level


  4.5mmol/L


(3.5-5.1) 


  


  


 


 


Chloride Level


  105mmol/L


() 


  


  


 


 


Carbon Dioxide Level


  24mmol/L


(21-32) 


  


  


 


 


Anion Gap 11 (6-14)    


 


Blood Urea Nitrogen 41mg/dL (7-20)    


 


Creatinine


  4.6mg/dL


(0.6-1.0) 


  


  


 


 


Estimated GFR


(Cockcroft-Gault) 9.8 


  


  


  


 


 


Glucose Level


  161mg/dL


(70-99) 


  


  


 


 


Calcium Level


  7.6mg/dL


(8.5-10.1) 


  


  


 


 


Phosphorus Level


  4.9mg/dL


(2.6-4.7) 


  


  


 


 


Magnesium Level


  2.1mg/dL


(1.8-2.4) 


  


  


 


 


Albumin


  2.0g/dL


(3.4-5.0) 


  


  


 


 


Glucose (Fingerstick)


  


  151mg/dL


(70-99) 120mg/dL


(70-99) 


 








Laboratory Tests








Test


  2/12/17


16:54 2/12/17


20:38 2/13/17


05:10 2/13/17


07:32


 


Glucose (Fingerstick)


  188mg/dL


(70-99) 158mg/dL


(70-99) 


  151mg/dL


(70-99)


 


White Blood Count


  


  


  6.8x10^3/uL


(4.0-11.0) 


 


 


Red Blood Count


  


  


  2.14x10^6/uL


(3.50-5.40) 


 


 


Hemoglobin


  


  


  6.8g/dL


(12.0-15.5) 


 


 


Hematocrit


  


  


  20.2%


(36.0-47.0) 


 


 


Mean Corpuscular Volume   94fL ()  


 


Mean Corpuscular Hemoglobin   32pg (25-35)  


 


Mean Corpuscular Hemoglobin


Concent 


  


  34g/dL (31-37) 


  


 


 


Red Cell Distribution Width


  


  


  14.6%


(11.5-14.5) 


 


 


Platelet Count


  


  


  166x10^3/uL


(140-400) 


 


 


Neutrophils (%) (Auto)   61% (31-73)  


 


Lymphocytes (%) (Auto)   25% (24-48)  


 


Monocytes (%) (Auto)   10% (0-9)  


 


Eosinophils (%) (Auto)   4% (0-3)  


 


Basophils (%) (Auto)   1% (0-3)  


 


Neutrophils # (Auto)


  


  


  4.1x10^3uL


(1.8-7.7) 


 


 


Lymphocytes # (Auto)


  


  


  1.7x10^3/uL


(1.0-4.8) 


 


 


Monocytes # (Auto)


  


  


  0.7x10^3/uL


(0.0-1.1) 


 


 


Eosinophils # (Auto)


  


  


  0.2x10^3/uL


(0.0-0.7) 


 


 


Basophils # (Auto)


  


  


  0.1x10^3/uL


(0.0-0.2) 


 


 


Sodium Level


  


  


  140mmol/L


(136-145) 


 


 


Potassium Level


  


  


  4.5mmol/L


(3.5-5.1) 


 


 


Chloride Level


  


  


  105mmol/L


() 


 


 


Carbon Dioxide Level


  


  


  24mmol/L


(21-32) 


 


 


Anion Gap   11 (6-14)  


 


Blood Urea Nitrogen   41mg/dL (7-20)  


 


Creatinine


  


  


  4.6mg/dL


(0.6-1.0) 


 


 


Estimated GFR


(Cockcroft-Gault) 


  


  9.8 


  


 


 


Glucose Level


  


  


  161mg/dL


(70-99) 


 


 


Calcium Level


  


  


  7.6mg/dL


(8.5-10.1) 


 


 


Phosphorus Level


  


  


  4.9mg/dL


(2.6-4.7) 


 


 


Magnesium Level


  


  


  2.1mg/dL


(1.8-2.4) 


 


 


Albumin


  


  


  2.0g/dL


(3.4-5.0) 


 














Test


  2/13/17


13:05 


  


  


 


 


Glucose (Fingerstick)


  120mg/dL


(70-99) 


  


  


 








Microbiology


2/10/17 Blood Culture - Preliminary, Resulted


          NO GROWTH AFTER 3 DAYS


Medications





 Current Medications


Aspirin 324 mg 324 mg 1X  ONCE PO  Last administered on 2/7/17at 12:59;  Start 2 /7/17 at 12:45;  Stop 2/7/17 at 12:48;  Status DC


Heparin Sodium/ Dextrose 500 ml @  19 mls/hr CONT  PRN IV SEE I/O RECORD Last 

administered on 2/8/17at 14:45;  Start 2/7/17 at 12:45;  Stop 2/9/17 at 15:30;  

Status DC


Heparin Sodium (Porcine) 1,950 unit PRN Q6HRS  PRN IV FOR UFH LEVEL LESS THAN 

0.2 Last administered on 2/7/17at 13:01;  Start 2/7/17 at 12:45;  Stop 2/9/17 

at 15:30;  Status DC


Ondansetron HCl (Zofran) 4 mg PRN Q8HRS  PRN IV NAUSEA/VOMITING;  Start 2/7/17 

at 12:45;  Stop 2/8/17 at 12:44;  Status DC


Morphine Sulfate 4 mg PRN Q2HR  PRN IV PAIN Last administered on 2/7/17at 14:03

;  Start 2/7/17 at 12:45;  Stop 2/8/17 at 12:44;  Status DC


Nitroglycerin (Nitrostat) 0.4 mg PRN Q5MIN  PRN SL CHEST PAIN Last administered 

on 2/7/17at 14:03;  Start 2/7/17 at 12:45;  Stop 2/8/17 at 12:44;  Status DC


Amlodipine Besylate (Norvasc) 5 mg DAILYWLUN PO  Last administered on 2/8/17at 

08:32;  Start 2/7/17 at 14:00;  Stop 2/8/17 at 16:02;  Status DC


Aspirin (Ecotrin) 81 mg DAILYWBKFT PO  Last administered on 2/13/17at 13:07;  

Start 2/7/17 at 14:00


Atorvastatin Calcium (Lipitor) 80 mg QHS PO  Last administered on 2/12/17at 20:

55;  Start 2/7/17 at 21:00


Carvedilol (Coreg) 25 mg BIDWMEALS PO  Last administered on 2/13/17at 13:07;  

Start 2/7/17 at 17:00


Clopidogrel Bisulfate (Plavix) 75 mg DAILY PO  Last administered on 2/7/17at 14:

28;  Start 2/7/17 at 14:00;  Stop 2/8/17 at 08:41;  Status DC


Isosorbide Mononitrate (Imdur) 30 mg DAILY PO  Last administered on 2/13/17at 13

:09;  Start 2/7/17 at 14:00


Furosemide (Lasix) 40 mg 1X  ONCE IVP  Last administered on 2/7/17at 14:30;  

Start 2/7/17 at 14:00;  Stop 2/7/17 at 14:23;  Status DC


Furosemide 40 mg 40 mg 1X  ONCE IVP  Last administered on 2/7/17at 14:30;  

Start 2/7/17 at 14:30;  Stop 2/7/17 at 14:38;  Status DC


Nitroglycerin/ Dextrose (Nitroglycerin Drip) 250 ml @ 0 mls/hr CONT  PRN IV SEE 

I/O RECORD Last administered on 2/7/17at 15:23;  Start 2/7/17 at 14:30;  Stop 2/ 8/17 at 16:02;  Status DC


Acetaminophen (Tylenol) 325 mg PRN Q6HRS  PRN PO MILD PAIN / TEMP;  Start 2/7/ 17 at 15:00


Acetaminophen/ Hydrocodone Bitart (Lortab 5/325) 1 tab PRN Q6HRS  PRN PO 

MODERATE TO SEVERE PAIN;  Start 2/7/17 at 15:00


Hydralazine HCl (Apresoline) 10 mg PRN Q4HRS  PRN IVP ELEVATED BP, SEE COMMENTS

;  Start 2/7/17 at 15:00


Ondansetron HCl (Zofran) 4 mg PRN Q8HRS  PRN IV NAUSEA/VOMITING;  Start 2/7/17 

at 15:00


Albuterol Sulfate (Ventolin Neb Soln) 2.5 mg PRN Q4HRS  PRN NEB SHORTNESS OF 

BREATH;  Start 2/7/17 at 15:00


Insulin Aspart (Novolog) 0-7 UNITS TIDWMEALS SQ  Last administered on 2/12/17at 

18:34;  Start 2/7/17 at 17:00


Dextrose 12.5 gm PRN Q15MIN  PRN IV SEE COMMENTS;  Start 2/7/17 at 15:00


Info 1 each 1 each PRN DAILY  PRN MC SEE COMMENTS Last administered on 2/9/17at 

09:38;  Start 2/7/17 at 17:00;  Stop 2/10/17 at 08:25;  Status DC


Magnesium Sulfate/ Dextrose (Magnesium Sulfate PREMIX 2GM) 50 ml @ 25 mls/hr 

PRN DAILY  PRN IV for Mag < 1.7 on am labs;  Start 2/8/17 at 11:15


Lidocaine/Sodium Bicarbonate (Buffered Lidocaine 1%) 3 ml 1X  ONCE IJ  Last 

administered on 2/8/17at 14:03;  Start 2/8/17 at 12:30;  Stop 2/8/17 at 12:34;  

Status DC


Heparin Sodium (Porcine) 2,400 unit 1X  ONCE INT CAT  Last administered on 2/8/ 17at 14:04;  Start 2/8/17 at 12:30;  Stop 2/8/17 at 12:34;  Status DC


Heparin Sodium/ Sodium Chloride 60 unit 1X  ONCE IV  Last administered on 2/8/ 17at 14:04;  Start 2/8/17 at 12:30;  Stop 2/8/17 at 12:34;  Status DC


Lisinopril (Prinivil) 20 mg DAILY PO  Last administered on 2/12/17at 09:22;  

Start 2/8/17 at 16:00;  Stop 2/13/17 at 10:31;  Status DC


Hydralazine HCl 50 mg 50 mg BID PO  Last administered on 2/12/17at 20:55;  

Start 2/8/17 at 21:00;  Stop 2/13/17 at 10:31;  Status DC


Sodium Chloride (Iv Sodium Chloride 0.9% 1000ml Bag) 1,000 ml @  1,000 mls/hr 

Q1H PRN IV hypotension;  Start 2/8/17 at 18:28;  Stop 2/9/17 at 00:27;  Status 

DC


Diphenhydramine HCl (Benadryl) 25 mg 1X PRN  PRN IV ITCHING;  Start 2/8/17 at 18

:30;  Stop 2/9/17 at 18:29;  Status DC


Diphenhydramine HCl (Benadryl) 25 mg 1X PRN  PRN IV ITCHING;  Start 2/8/17 at 18

:30;  Stop 2/9/17 at 18:29;  Status DC


Sodium Chloride (Normal Saline Flush) 10 ml 1X PRN  PRN IV AP catheter pack;  

Start 2/8/17 at 18:30;  Stop 2/9/17 at 18:29;  Status DC


Sodium Chloride 10 ml 10 ml 1X PRN  PRN IV  catheter pack;  Start 2/8/17 at 18

:30;  Stop 2/9/17 at 18:29;  Status DC


Sodium Chloride (Iv Sodium Chloride 0.9% 1000ml Bag) 1,000 ml @  400 mls/hr 

Q2H30M PRN IV PATENCY;  Start 2/8/17 at 18:28;  Stop 2/9/17 at 06:27;  Status DC


Info (PHARMACY MONITORING -- do not chart) 1 each PRN DAILY  PRN MC SEE COMMENTS

;  Start 2/8/17 at 18:30


Info (PHARMACY MONITORING -- do not chart) 1 each PRN DAILY  PRN MC SEE COMMENTS

;  Start 2/9/17 at 10:30;  Status UNV


Info (PHARMACY MONITORING -- do not chart) 1 each PRN DAILY  PRN MC SEE COMMENTS

;  Start 2/9/17 at 10:30;  Status Cancel


Darbepoetin Valeriano (Aranesp) 60 mcg WEEKLYHS SQ  Last administered on 2/9/17at 20:

48;  Start 2/9/17 at 21:00


Clopidogrel Bisulfate (Plavix) 75 mg DAILYWBKFT PO ;  Start 2/10/17 at 08:00;  

Stop 2/10/17 at 08:00;  Status DC


Clopidogrel Bisulfate 75 mg 75 mg DAILYWBKFT PO  Last administered on 2/10/17at 

08:25;  Start 2/9/17 at 15:30;  Stop 2/11/17 at 18:54;  Status DC


Sodium Chloride (Iv Sodium Chloride 0.9% 1000ml Bag) 1,000 ml @  1,000 mls/hr 

Q1H PRN IV hypotension;  Start 2/10/17 at 08:11;  Stop 2/10/17 at 15:00;  

Status DC


Diphenhydramine HCl (Benadryl) 25 mg 1X PRN  PRN IV ITCHING;  Start 2/10/17 at 

08:15;  Stop 2/10/17 at 15:00;  Status DC


Diphenhydramine HCl (Benadryl) 25 mg 1X PRN  PRN IV ITCHING;  Start 2/10/17 at 

08:15;  Stop 2/10/17 at 15:00;  Status DC


Sodium Chloride (Normal Saline Flush) 10 ml 1X PRN  PRN IV AP catheter pack;  

Start 2/10/17 at 08:15;  Stop 2/10/17 at 15:00;  Status DC


Sodium Chloride 10 ml 10 ml 1X PRN  PRN IV  catheter pack;  Start 2/10/17 at 

08:15;  Stop 2/10/17 at 15:00;  Status DC


Sodium Chloride (Iv Sodium Chloride 0.9% 1000ml Bag) 1,000 ml @  400 mls/hr 

Q2H30M PRN IV PATENCY;  Start 2/10/17 at 08:11;  Stop 2/10/17 at 21:00;  Status 

DC


Info 1 each 1 each PRN DAILY  PRN MC SEE COMMENTS;  Start 2/10/17 at 08:15;  

Status UNV


Magnesium Sulfate/ Dextrose 100 ml @  100 mls/hr 1X  ONCE IV  Last administered 

on 2/10/17at 12:36;  Start 2/10/17 at 10:30;  Stop 2/10/17 at 11:29;  Status DC


Ceftriaxone Sodium 1 gm/ Sodium Chloride 50 ml @  100 mls/hr Q24H IV  Last 

administered on 2/12/17at 18:27;  Start 2/12/17 at 16:00


Sodium Chloride 1,000 ml @  1,000 mls/hr Q1H PRN IV hypotension;  Start 2/13/17 

at 08:48;  Stop 2/13/17 at 14:47


Sodium Chloride (Iv Sodium Chloride 0.9% 1000ml Bag) 1,000 ml @  400 mls/hr 

Q2H30M PRN IV PATENCY;  Start 2/13/17 at 08:48;  Stop 2/13/17 at 20:47


Info (PHARMACY MONITORING -- do not chart) 1 each PRN DAILY  PRN MC SEE COMMENTS

;  Start 2/13/17 at 09:00;  Status UNV


Lisinopril (Prinivil) 40 mg DAILY PO ;  Start 2/14/17 at 09:00





Active Scripts


Active


Lisinopril 20 Mg Tablet 20 Mg PO QHS


Isosorbide Mononitrate Er (Isosorbide Mononitrate) 30 Mg Tab.er.24h 30 Mg PO 

DAILY


Hydralazine Hcl 50 Mg Tablet 100 Mg PO TID


Carvedilol 12.5 Mg Tablet 25 Mg PO BIDWMEALS


Atorvastatin Calcium 40 Mg Tablet 80 Mg PO QHS


Aspirin Ec (Aspirin) 81 Mg Tablet.dr 81 Mg PO DAILYWBKFT


Amlodipine Besylate 5 Mg Tablet 5 Mg PO DAILYWLUN


Reported


Clopidogrel (Clopidogrel Bisulfate) 75 Mg Tablet 75 Mg PO DAILY


Glyburide 5 Mg Tablet 1 Tab PO BIDWMEALS


Lisinopril 20 Mg Tablet 1 Tab PO DAILY


Vitals/I & O





 Vital Sign - Last 24 Hours








 2/12/17 2/12/17 2/12/17 2/12/17





 18:27 19:10 19:59 20:55


 


Temp  98.6  





  98.6  


 


Pulse 75 77  77


 


Resp  18  


 


B/P 134/54 148/59  148/59


 


Pulse Ox  98  


 


O2 Delivery  Room Air Room Air 


 


    





    





 2/12/17 2/13/17 2/13/17 2/13/17





 23:00 03:00 07:00 07:45


 


Temp 98.5 98.4 98.5 





 98.5 98.4 98.5 


 


Pulse 78 72 71 


 


Resp 20 22 18 


 


B/P 136/47 157/65 158/56 


 


Pulse Ox 94 98 98 


 


O2 Delivery Room Air Room Air Room Air Room Air


 


    





    





 2/13/17 2/13/17 2/13/17 





 13:07 13:09 14:20 


 


Temp   98.5 





   98.5 


 


Pulse 71 86 79 


 


Resp   20 


 


B/P 158/56 205/87 130/60 


 


Pulse Ox   100 


 


O2 Delivery   Room Air 














 Intake and Output   


 


 2/12/17 2/12/17 2/13/17





 15:00 23:00 07:00


 


Intake Total 250 ml 570 ml 400 ml


 


Output Total  600 ml 200 ml


 


Balance 250 ml -30 ml 200 ml














ARTEMIO NUNES MD Feb 13, 2017 14:49

## 2017-02-14 NOTE — PDOC
MODERATE SEDATION ASSESSMENT


RISKS/ALTERNATIVES


Risks/Alternatives


Risks and alternatives of this type of sedation and procedure discussed with:


RISK/ALTERNATIVES:  Patient





H & P ON CHART


H & P


H & P on chart and reviewed for co-morbid conditions and appropriate labs.


H&P ON CHART:  Yes





PREGNANCY STATUS


PREG STATUS ASSESSED:  N/A





MEDS/ALLERGIES REVIEWED


Meds/Allergies Reviewed


Medications and Allergies including time and route of recently administered 

narcotics and sedatives.


MEDS/ALLERGIES REVIEWED:  Yes





ASA RATING


ASA RATING:  III





AIRWAY ASSESSMENT


Airway Assessment


Airway patency, oral function limitations, presence  of caps, crowns, dentures, 

partials, and ability to extend neck assessed.


AIRWAY ASSESSMENT:  Yes





MALLAMPATI SCORE


MALLAMPATI SCORE:  III





PRE-SEDATION ASSESSMENT


PRE-SEDATION ASSESSMENT:  Yes








LEYD BOATENG MD Feb 14, 2017 18:12

## 2017-02-14 NOTE — PDOC
PROGRESS NOTES


Chief Complaint


Chief Complaint


cc: sob 





A//P


1.  Acute on chronic systolic congestive heart failure. improved 


2.  Non-ST-segment elevation myocardial infarction.


3.  WALTER with CKD


4.  Ischemic cardiomyopathy.


5.  Malignant hypertension, Better


6.  Anemia


7.  Hyperlipidemia.


8.  Psoriasis.


9. CAD


10. Anemia


11. UTI not POA





Plan 


IV Rocephin, fu ucx


HD per  nephrology 


SW to arrange out pt HD


BP controlled, 


low hemoglobin but pt declined to have blood products due to [employer] reasons. 

risks and benefits explained. 


check vitb12, fa, add iron po


SSI 


Lipitor 


labs reviewed 


Monitor hemoglobin 


Prognosis guarded. 


talked to daughter at bedside


talked to CARD, NO intervention now given low HB, PT WILL get HD perm cath today

, dc in 1-2ds when outpt HD set up





History of Present Illness


History of Present Illness


sob better


no fever


no chills. 


no chest pain 





still dysuria, frequent urination





Vitals


Vitals





 Vital Signs








  Date Time  Temp Pulse Resp B/P Pulse Ox O2 Delivery O2 Flow Rate FiO2


 


2/14/17 11:10 98.4 67 18 155/57 98 Room Air  





 98.4       


 


2/14/17 08:00       2.0 











Physical Exam


General:  Alert, Oriented X3


Heart:  Regular rate, Normal S1, Normal S2, No murmurs, Gallops


Lungs:  Clear, Other


Abdomen:  Normal bowel sounds, Soft, No tenderness, No hepatosplenomegaly, No 

masses


Extremities:  No clubbing, No cyanosis, No edema, Normal pulses, No tenderness/

swelling


Skin:  No breakdown, No significant lesion, Other (psoriasis )





Labs


LABS





Laboratory Tests








Test


  2/13/17


16:56 2/13/17


22:09 2/14/17


04:30 2/14/17


08:19


 


Glucose (Fingerstick)


  201mg/dL


(70-99) 246mg/dL


(70-99) 


  176mg/dL


(70-99)


 


Sodium Level


  


  


  139mmol/L


(136-145) 


 


 


Potassium Level


  


  


  4.1mmol/L


(3.5-5.1) 


 


 


Chloride Level


  


  


  101mmol/L


() 


 


 


Carbon Dioxide Level


  


  


  29mmol/L


(21-32) 


 


 


Anion Gap   9 (6-14)  


 


Blood Urea Nitrogen   26mg/dL (7-20)  


 


Creatinine


  


  


  3.3mg/dL


(0.6-1.0) 


 


 


Estimated GFR


(Cockcroft-Gault) 


  


  14.3 


  


 


 


Glucose Level


  


  


  184mg/dL


(70-99) 


 


 


Calcium Level


  


  


  7.5mg/dL


(8.5-10.1) 


 


 


Phosphorus Level


  


  


  4.1mg/dL


(2.6-4.7) 


 


 


Albumin


  


  


  2.1g/dL


(3.4-5.0) 


 














Test


  2/14/17


11:11 


  


  


 


 


Glucose (Fingerstick)


  189mg/dL


(70-99) 


  


  


 











Review of Systems


Review of Systems


no fever, chills, sob or chest pain





Assessment and Plan


Assessmemt and Plan


 Problems


Medical Problems:


(1) Congestive heart failure


Status: Acute  





(2) NSTEMI (non-ST elevated myocardial infarction)


Status: Acute  








Problems:  





Comment


Review of Relevant


I have reviewed the following items lore (where applicable) has been applied.


Labs





Laboratory Tests








Test


  2/12/17


16:54 2/12/17


20:38 2/13/17


05:10 2/13/17


07:32


 


Glucose (Fingerstick)


  188mg/dL


(70-99) 158mg/dL


(70-99) 


  151mg/dL


(70-99)


 


White Blood Count


  


  


  6.8x10^3/uL


(4.0-11.0) 


 


 


Red Blood Count


  


  


  2.14x10^6/uL


(3.50-5.40) 


 


 


Hemoglobin


  


  


  6.8g/dL


(12.0-15.5) 


 


 


Hematocrit


  


  


  20.2%


(36.0-47.0) 


 


 


Mean Corpuscular Volume   94fL ()  


 


Mean Corpuscular Hemoglobin   32pg (25-35)  


 


Mean Corpuscular Hemoglobin


Concent 


  


  34g/dL (31-37) 


  


 


 


Red Cell Distribution Width


  


  


  14.6%


(11.5-14.5) 


 


 


Platelet Count


  


  


  166x10^3/uL


(140-400) 


 


 


Neutrophils (%) (Auto)   61% (31-73)  


 


Lymphocytes (%) (Auto)   25% (24-48)  


 


Monocytes (%) (Auto)   10% (0-9)  


 


Eosinophils (%) (Auto)   4% (0-3)  


 


Basophils (%) (Auto)   1% (0-3)  


 


Neutrophils # (Auto)


  


  


  4.1x10^3uL


(1.8-7.7) 


 


 


Lymphocytes # (Auto)


  


  


  1.7x10^3/uL


(1.0-4.8) 


 


 


Monocytes # (Auto)


  


  


  0.7x10^3/uL


(0.0-1.1) 


 


 


Eosinophils # (Auto)


  


  


  0.2x10^3/uL


(0.0-0.7) 


 


 


Basophils # (Auto)


  


  


  0.1x10^3/uL


(0.0-0.2) 


 


 


Sodium Level


  


  


  140mmol/L


(136-145) 


 


 


Potassium Level


  


  


  4.5mmol/L


(3.5-5.1) 


 


 


Chloride Level


  


  


  105mmol/L


() 


 


 


Carbon Dioxide Level


  


  


  24mmol/L


(21-32) 


 


 


Anion Gap   11 (6-14)  


 


Blood Urea Nitrogen   41mg/dL (7-20)  


 


Creatinine


  


  


  4.6mg/dL


(0.6-1.0) 


 


 


Estimated GFR


(Cockcroft-Gault) 


  


  9.8 


  


 


 


Glucose Level


  


  


  161mg/dL


(70-99) 


 


 


Calcium Level


  


  


  7.6mg/dL


(8.5-10.1) 


 


 


Phosphorus Level


  


  


  4.9mg/dL


(2.6-4.7) 


 


 


Magnesium Level


  


  


  2.1mg/dL


(1.8-2.4) 


 


 


Albumin


  


  


  2.0g/dL


(3.4-5.0) 


 














Test


  2/13/17


13:05 2/13/17


16:56 2/13/17


22:09 2/14/17


04:30


 


Glucose (Fingerstick)


  120mg/dL


(70-99) 201mg/dL


(70-99) 246mg/dL


(70-99) 


 


 


Sodium Level


  


  


  


  139mmol/L


(136-145)


 


Potassium Level


  


  


  


  4.1mmol/L


(3.5-5.1)


 


Chloride Level


  


  


  


  101mmol/L


()


 


Carbon Dioxide Level


  


  


  


  29mmol/L


(21-32)


 


Anion Gap    9 (6-14) 


 


Blood Urea Nitrogen    26mg/dL (7-20) 


 


Creatinine


  


  


  


  3.3mg/dL


(0.6-1.0)


 


Estimated GFR


(Cockcroft-Gault) 


  


  


  14.3 


 


 


Glucose Level


  


  


  


  184mg/dL


(70-99)


 


Calcium Level


  


  


  


  7.5mg/dL


(8.5-10.1)


 


Phosphorus Level


  


  


  


  4.1mg/dL


(2.6-4.7)


 


Albumin


  


  


  


  2.1g/dL


(3.4-5.0)














Test


  2/14/17


08:19 2/14/17


11:11 


  


 


 


Glucose (Fingerstick)


  176mg/dL


(70-99) 189mg/dL


(70-99) 


  


 








Laboratory Tests








Test


  2/13/17


16:56 2/13/17


22:09 2/14/17


04:30 2/14/17


08:19


 


Glucose (Fingerstick)


  201mg/dL


(70-99) 246mg/dL


(70-99) 


  176mg/dL


(70-99)


 


Sodium Level


  


  


  139mmol/L


(136-145) 


 


 


Potassium Level


  


  


  4.1mmol/L


(3.5-5.1) 


 


 


Chloride Level


  


  


  101mmol/L


() 


 


 


Carbon Dioxide Level


  


  


  29mmol/L


(21-32) 


 


 


Anion Gap   9 (6-14)  


 


Blood Urea Nitrogen   26mg/dL (7-20)  


 


Creatinine


  


  


  3.3mg/dL


(0.6-1.0) 


 


 


Estimated GFR


(Cockcroft-Gault) 


  


  14.3 


  


 


 


Glucose Level


  


  


  184mg/dL


(70-99) 


 


 


Calcium Level


  


  


  7.5mg/dL


(8.5-10.1) 


 


 


Phosphorus Level


  


  


  4.1mg/dL


(2.6-4.7) 


 


 


Albumin


  


  


  2.1g/dL


(3.4-5.0) 


 














Test


  2/14/17


11:11 


  


  


 


 


Glucose (Fingerstick)


  189mg/dL


(70-99) 


  


  


 








Microbiology


2/10/17 Blood Culture - Preliminary, Resulted


          NO GROWTH AFTER 4 DAYS


2/12/17 Urine Culture - Final, Complete


          


2/12/17 Urine Culture Result 1 (SEAN) - Final, Complete


Medications





 Current Medications


Aspirin 324 mg 324 mg 1X  ONCE PO  Last administered on 2/7/17at 12:59;  Start 2 /7/17 at 12:45;  Stop 2/7/17 at 12:48;  Status DC


Heparin Sodium/ Dextrose 500 ml @  19 mls/hr CONT  PRN IV SEE I/O RECORD Last 

administered on 2/8/17at 14:45;  Start 2/7/17 at 12:45;  Stop 2/9/17 at 15:30;  

Status DC


Heparin Sodium (Porcine) 1,950 unit PRN Q6HRS  PRN IV FOR UFH LEVEL LESS THAN 

0.2 Last administered on 2/7/17at 13:01;  Start 2/7/17 at 12:45;  Stop 2/9/17 

at 15:30;  Status DC


Ondansetron HCl (Zofran) 4 mg PRN Q8HRS  PRN IV NAUSEA/VOMITING;  Start 2/7/17 

at 12:45;  Stop 2/8/17 at 12:44;  Status DC


Morphine Sulfate 4 mg PRN Q2HR  PRN IV PAIN Last administered on 2/7/17at 14:03

;  Start 2/7/17 at 12:45;  Stop 2/8/17 at 12:44;  Status DC


Nitroglycerin (Nitrostat) 0.4 mg PRN Q5MIN  PRN SL CHEST PAIN Last administered 

on 2/7/17at 14:03;  Start 2/7/17 at 12:45;  Stop 2/8/17 at 12:44;  Status DC


Amlodipine Besylate (Norvasc) 5 mg DAILYWLUN PO  Last administered on 2/8/17at 

08:32;  Start 2/7/17 at 14:00;  Stop 2/8/17 at 16:02;  Status DC


Aspirin (Ecotrin) 81 mg DAILYWBKFT PO  Last administered on 2/13/17at 13:07;  

Start 2/7/17 at 14:00


Atorvastatin Calcium (Lipitor) 80 mg QHS PO  Last administered on 2/13/17at 22:

05;  Start 2/7/17 at 21:00


Carvedilol (Coreg) 25 mg BIDWMEALS PO  Last administered on 2/13/17at 17:39;  

Start 2/7/17 at 17:00


Clopidogrel Bisulfate (Plavix) 75 mg DAILY PO  Last administered on 2/7/17at 14:

28;  Start 2/7/17 at 14:00;  Stop 2/8/17 at 08:41;  Status DC


Isosorbide Mononitrate (Imdur) 30 mg DAILY PO  Last administered on 2/13/17at 13

:09;  Start 2/7/17 at 14:00


Furosemide (Lasix) 40 mg 1X  ONCE IVP  Last administered on 2/7/17at 14:30;  

Start 2/7/17 at 14:00;  Stop 2/7/17 at 14:23;  Status DC


Furosemide 40 mg 40 mg 1X  ONCE IVP  Last administered on 2/7/17at 14:30;  

Start 2/7/17 at 14:30;  Stop 2/7/17 at 14:38;  Status DC


Nitroglycerin/ Dextrose (Nitroglycerin Drip) 250 ml @ 0 mls/hr CONT  PRN IV SEE 

I/O RECORD Last administered on 2/7/17at 15:23;  Start 2/7/17 at 14:30;  Stop 2/ 8/17 at 16:02;  Status DC


Acetaminophen (Tylenol) 325 mg PRN Q6HRS  PRN PO MILD PAIN / TEMP Last 

administered on 2/13/17at 21:59;  Start 2/7/17 at 15:00


Acetaminophen/ Hydrocodone Bitart (Lortab 5/325) 1 tab PRN Q6HRS  PRN PO 

MODERATE TO SEVERE PAIN;  Start 2/7/17 at 15:00


Hydralazine HCl (Apresoline) 10 mg PRN Q4HRS  PRN IVP ELEVATED BP, SEE COMMENTS

;  Start 2/7/17 at 15:00


Ondansetron HCl (Zofran) 4 mg PRN Q8HRS  PRN IV NAUSEA/VOMITING;  Start 2/7/17 

at 15:00


Albuterol Sulfate (Ventolin Neb Soln) 2.5 mg PRN Q4HRS  PRN NEB SHORTNESS OF 

BREATH;  Start 2/7/17 at 15:00


Insulin Aspart (Novolog) 0-7 UNITS TIDWMEALS SQ  Last administered on 2/13/17at 

17:45;  Start 2/7/17 at 17:00


Dextrose 12.5 gm PRN Q15MIN  PRN IV SEE COMMENTS;  Start 2/7/17 at 15:00


Info 1 each 1 each PRN DAILY  PRN MC SEE COMMENTS Last administered on 2/9/17at 

09:38;  Start 2/7/17 at 17:00;  Stop 2/10/17 at 08:25;  Status DC


Magnesium Sulfate/ Dextrose (Magnesium Sulfate PREMIX 2GM) 50 ml @ 25 mls/hr 

PRN DAILY  PRN IV for Mag < 1.7 on am labs;  Start 2/8/17 at 11:15


Lidocaine/Sodium Bicarbonate (Buffered Lidocaine 1%) 3 ml 1X  ONCE IJ  Last 

administered on 2/8/17at 14:03;  Start 2/8/17 at 12:30;  Stop 2/8/17 at 12:34;  

Status DC


Heparin Sodium (Porcine) 2,400 unit 1X  ONCE INT CAT  Last administered on 2/8/ 17at 14:04;  Start 2/8/17 at 12:30;  Stop 2/8/17 at 12:34;  Status DC


Heparin Sodium/ Sodium Chloride 60 unit 1X  ONCE IV  Last administered on 2/8/ 17at 14:04;  Start 2/8/17 at 12:30;  Stop 2/8/17 at 12:34;  Status DC


Lisinopril (Prinivil) 20 mg DAILY PO  Last administered on 2/12/17at 09:22;  

Start 2/8/17 at 16:00;  Stop 2/13/17 at 10:31;  Status DC


Hydralazine HCl 50 mg 50 mg BID PO  Last administered on 2/12/17at 20:55;  

Start 2/8/17 at 21:00;  Stop 2/13/17 at 10:31;  Status DC


Sodium Chloride (Iv Sodium Chloride 0.9% 1000ml Bag) 1,000 ml @  1,000 mls/hr 

Q1H PRN IV hypotension;  Start 2/8/17 at 18:28;  Stop 2/9/17 at 00:27;  Status 

DC


Diphenhydramine HCl (Benadryl) 25 mg 1X PRN  PRN IV ITCHING;  Start 2/8/17 at 18

:30;  Stop 2/9/17 at 18:29;  Status DC


Diphenhydramine HCl (Benadryl) 25 mg 1X PRN  PRN IV ITCHING;  Start 2/8/17 at 18

:30;  Stop 2/9/17 at 18:29;  Status DC


Sodium Chloride (Normal Saline Flush) 10 ml 1X PRN  PRN IV AP catheter pack;  

Start 2/8/17 at 18:30;  Stop 2/9/17 at 18:29;  Status DC


Sodium Chloride 10 ml 10 ml 1X PRN  PRN IV  catheter pack;  Start 2/8/17 at 18

:30;  Stop 2/9/17 at 18:29;  Status DC


Sodium Chloride (Iv Sodium Chloride 0.9% 1000ml Bag) 1,000 ml @  400 mls/hr 

Q2H30M PRN IV PATENCY;  Start 2/8/17 at 18:28;  Stop 2/9/17 at 06:27;  Status DC


Info (PHARMACY MONITORING -- do not chart) 1 each PRN DAILY  PRN MC SEE COMMENTS

;  Start 2/8/17 at 18:30


Info (PHARMACY MONITORING -- do not chart) 1 each PRN DAILY  PRN MC SEE COMMENTS

;  Start 2/9/17 at 10:30;  Status UNV


Info (PHARMACY MONITORING -- do not chart) 1 each PRN DAILY  PRN MC SEE COMMENTS

;  Start 2/9/17 at 10:30;  Status Cancel


Darbepoetin Valeriano (Aranesp) 60 mcg WEEKLYHS SQ  Last administered on 2/9/17at 20:

48;  Start 2/9/17 at 21:00


Clopidogrel Bisulfate (Plavix) 75 mg DAILYWBKFT PO ;  Start 2/10/17 at 08:00;  

Stop 2/10/17 at 08:00;  Status DC


Clopidogrel Bisulfate 75 mg 75 mg DAILYWBKFT PO  Last administered on 2/10/17at 

08:25;  Start 2/9/17 at 15:30;  Stop 2/11/17 at 18:54;  Status DC


Sodium Chloride (Iv Sodium Chloride 0.9% 1000ml Bag) 1,000 ml @  1,000 mls/hr 

Q1H PRN IV hypotension;  Start 2/10/17 at 08:11;  Stop 2/10/17 at 15:00;  

Status DC


Diphenhydramine HCl (Benadryl) 25 mg 1X PRN  PRN IV ITCHING;  Start 2/10/17 at 

08:15;  Stop 2/10/17 at 15:00;  Status DC


Diphenhydramine HCl (Benadryl) 25 mg 1X PRN  PRN IV ITCHING;  Start 2/10/17 at 

08:15;  Stop 2/10/17 at 15:00;  Status DC


Sodium Chloride (Normal Saline Flush) 10 ml 1X PRN  PRN IV AP catheter pack;  

Start 2/10/17 at 08:15;  Stop 2/10/17 at 15:00;  Status DC


Sodium Chloride 10 ml 10 ml 1X PRN  PRN IV  catheter pack;  Start 2/10/17 at 

08:15;  Stop 2/10/17 at 15:00;  Status DC


Sodium Chloride (Iv Sodium Chloride 0.9% 1000ml Bag) 1,000 ml @  400 mls/hr 

Q2H30M PRN IV PATENCY;  Start 2/10/17 at 08:11;  Stop 2/10/17 at 21:00;  Status 

DC


Info 1 each 1 each PRN DAILY  PRN MC SEE COMMENTS;  Start 2/10/17 at 08:15;  

Status UNV


Magnesium Sulfate/ Dextrose 100 ml @  100 mls/hr 1X  ONCE IV  Last administered 

on 2/10/17at 12:36;  Start 2/10/17 at 10:30;  Stop 2/10/17 at 11:29;  Status DC


Ceftriaxone Sodium 1 gm/ Sodium Chloride 50 ml @  100 mls/hr Q24H IV  Last 

administered on 2/13/17at 16:13;  Start 2/12/17 at 16:00


Sodium Chloride 1,000 ml @  1,000 mls/hr Q1H PRN IV hypotension;  Start 2/13/17 

at 08:48;  Stop 2/13/17 at 14:47;  Status DC


Sodium Chloride (Iv Sodium Chloride 0.9% 1000ml Bag) 1,000 ml @  400 mls/hr 

Q2H30M PRN IV PATENCY;  Start 2/13/17 at 08:48;  Stop 2/13/17 at 20:47;  Status 

DC


Info (PHARMACY MONITORING -- do not chart) 1 each PRN DAILY  PRN MC SEE COMMENTS

;  Start 2/13/17 at 09:00;  Status UNV


Lisinopril (Prinivil) 40 mg DAILY PO ;  Start 2/14/17 at 09:00


Cyanocobalamin (Vitamin B-12) 1,000 mcg DAILY PO  Last administered on 2/13/ 17at 16:09;  Start 2/13/17 at 15:30


Ferrous Sulfate (Feosol) 325 mg BID PO  Last administered on 2/13/17at 22:05;  

Start 2/13/17 at 21:00





Active Scripts


Active


Lisinopril 20 Mg Tablet 20 Mg PO QHS


Isosorbide Mononitrate Er (Isosorbide Mononitrate) 30 Mg Tab.er.24h 30 Mg PO 

DAILY


Hydralazine Hcl 50 Mg Tablet 100 Mg PO TID


Carvedilol 12.5 Mg Tablet 25 Mg PO BIDWMEALS


Atorvastatin Calcium 40 Mg Tablet 80 Mg PO QHS


Aspirin Ec (Aspirin) 81 Mg Tablet. 81 Mg PO DAILYWBKFT


Amlodipine Besylate 5 Mg Tablet 5 Mg PO DAILYWLUN


Reported


Clopidogrel (Clopidogrel Bisulfate) 75 Mg Tablet 75 Mg PO DAILY


Glyburide 5 Mg Tablet 1 Tab PO BIDWMEALS


Lisinopril 20 Mg Tablet 1 Tab PO DAILY


Vitals/I & O





 Vital Sign - Last 24 Hours








 2/13/17 2/13/17 2/13/17 2/13/17





 14:20 17:39 19:00 20:00


 


Temp 98.5  98.5 





 98.5  98.5 


 


Pulse 79 79 78 


 


Resp 20  20 


 


B/P 130/60 130/60 137/50 


 


Pulse Ox 100  78 


 


O2 Delivery Room Air  Room Air Room Air


 


    





    





 2/13/17 2/14/17 2/14/17 2/14/17





 23:00 03:05 07:00 08:00


 


Temp 98.2 98.2 98.2 





 98.2 98.2 98.2 


 


Pulse 86 70 67 


 


Resp 20 18 18 


 


B/P 133/58 154/63 147/57 


 


Pulse Ox 96 98 98 


 


O2 Delivery Room Air Room Air Room Air Room Air


 


O2 Flow Rate    2.0


 


    





    





 2/14/17   





 11:10   


 


Temp 98.4   





 98.4   


 


Pulse 67   


 


Resp 18   


 


B/P 155/57   


 


Pulse Ox 98   


 


O2 Delivery Room Air   














 Intake and Output   


 


 2/13/17 2/13/17 2/14/17





 15:00 23:00 07:00


 


Intake Total  350 ml 0 ml


 


Output Total   650 ml


 


Balance  350 ml -650 ml














ARTEMIO NUNES MD Feb 14, 2017 13:23

## 2017-02-14 NOTE — PDOC
Exam








Pretty





Assistant


Assistant


DOMINGUEZ Tello





Pre-Procedure Diagnosis


Pre-Procedure Diagnosis


NSTEMI.  CHF.  WALTER on CKD---new ESRD.  Needs ongoing HD.  Conversion from temp 

to tunneled HDC requested.





Post-Procedure Diagnosis


Post-Procedure Diagnosis


Same





Procedure Performed


Procedure Performed


Removal rt IJ temp HDC


Sono/fluoro guided rt IJ tunneled HDC insertion





Type of Anesthesia


Type of Anesthesia


Local + Mod sedation





Estimated Blood Loss


EBL:


Minimal





Specimens


Specimans


14F 20cm Rt IJ Schon temp HDC removed and discarded





Drain/Tubes


Drains/Tubes


Rt IJ 15.5F 28cm Dura Max tunneled HDC inserted





Condition of Patient


Condition of Patient


Stable.  No apparent complication.





Disposition


Disposition


From IR return to 246.  F/u with Renal.  OK to use new tunneled HDC.  Full 

report to follow.








LEDY BOATENG MD Feb 14, 2017 18:16

## 2017-02-15 NOTE — PDOC
PROGRESS NOTES


Chief Complaint


Chief Complaint


cc: sob 





A//P


1.  Acute on chronic systolic congestive heart failure. improved 


2.  Non-ST-segment elevation myocardial infarction.


3.  WALTER with CKD


4.  Ischemic cardiomyopathy.


5.  Malignant hypertension, Better


6.  Anemia


7.  Hyperlipidemia.


8.  Psoriasis.


9. CAD


10. Anemia


11. UTI not POA





Plan 


IV Rocephin, fu ucx


HD per  nephrology 


SW to arrange out pt HD


BP controlled, 


low hemoglobin but pt declined to have blood products due to [employer] reasons. 

risks and benefits explained. 


check vitb12, fa, add iron po


SSI 


Lipitor 


labs reviewed 


Monitor hemoglobin 


Prognosis guarded. 


talked to daughter at bedside


talked to CARD, NO intervention now given low HB, PT WILL get HD perm cath today

, dc in 1-2ds when outpt HD set up





History of Present Illness


History of Present Illness


sob better


no fever


no chills. 


no chest pain 





still dysuria, frequent urination





Vitals


Vitals





 Vital Signs








  Date Time  Temp Pulse Resp B/P Pulse Ox O2 Delivery O2 Flow Rate FiO2


 


2/15/17 08:17     95 Room Air  


 


2/15/17 07:30 98.0 71 18 161/59    





 98.0       


 


2/14/17 18:04       2.0 











Physical Exam


General:  Alert, Oriented X3


Heart:  Regular rate, Normal S1, Normal S2, No murmurs, Gallops


Lungs:  Clear, Other


Abdomen:  Normal bowel sounds, Soft, No tenderness, No hepatosplenomegaly, No 

masses


Extremities:  No clubbing, No cyanosis, No edema, Normal pulses, No tenderness/

swelling


Skin:  No breakdown, No significant lesion, Other (psoriasis )





Labs


LABS





Laboratory Tests








Test


  2/14/17


17:14 2/14/17


20:47 2/15/17


05:23 2/15/17


08:20


 


Glucose (Fingerstick)


  128mg/dL


(70-99) 268mg/dL


(70-99) 


  151mg/dL


(70-99)


 


White Blood Count


  


  


  6.7x10^3/uL


(4.0-11.0) 


 


 


Red Blood Count


  


  


  2.34x10^6/uL


(3.50-5.40) 


 


 


Hemoglobin


  


  


  7.2g/dL


(12.0-15.5) 


 


 


Hematocrit


  


  


  22.0%


(36.0-47.0) 


 


 


Mean Corpuscular Volume   94fL ()  


 


Mean Corpuscular Hemoglobin   31pg (25-35)  


 


Mean Corpuscular Hemoglobin


Concent 


  


  33g/dL (31-37) 


  


 


 


Red Cell Distribution Width


  


  


  14.5%


(11.5-14.5) 


 


 


Platelet Count


  


  


  176x10^3/uL


(140-400) 


 


 


Neutrophils (%) (Auto)   60% (31-73)  


 


Lymphocytes (%) (Auto)   24% (24-48)  


 


Monocytes (%) (Auto)   11% (0-9)  


 


Eosinophils (%) (Auto)   4% (0-3)  


 


Basophils (%) (Auto)   1% (0-3)  


 


Neutrophils # (Auto)


  


  


  4.1x10^3uL


(1.8-7.7) 


 


 


Lymphocytes # (Auto)


  


  


  1.6x10^3/uL


(1.0-4.8) 


 


 


Monocytes # (Auto)


  


  


  0.7x10^3/uL


(0.0-1.1) 


 


 


Eosinophils # (Auto)


  


  


  0.3x10^3/uL


(0.0-0.7) 


 


 


Basophils # (Auto)


  


  


  0.1x10^3/uL


(0.0-0.2) 


 


 


Sodium Level


  


  


  140mmol/L


(136-145) 


 


 


Potassium Level


  


  


  4.3mmol/L


(3.5-5.1) 


 


 


Chloride Level


  


  


  103mmol/L


() 


 


 


Carbon Dioxide Level


  


  


  28mmol/L


(21-32) 


 


 


Anion Gap   9 (6-14)  


 


Blood Urea Nitrogen   34mg/dL (7-20)  


 


Creatinine


  


  


  4.2mg/dL


(0.6-1.0) 


 


 


Estimated GFR


(Cockcroft-Gault) 


  


  10.8 


  


 


 


Glucose Level


  


  


  138mg/dL


(70-99) 


 


 


Calcium Level


  


  


  7.8mg/dL


(8.5-10.1) 


 


 


Phosphorus Level


  


  


  4.9mg/dL


(2.6-4.7) 


 


 


Albumin


  


  


  2.1g/dL


(3.4-5.0) 


 











Review of Systems


Review of Systems


no fever, chills, sob or chest pain





Assessment and Plan


Assessmemt and Plan


 Problems


Medical Problems:


(1) Congestive heart failure


Status: Acute  





(2) NSTEMI (non-ST elevated myocardial infarction)


Status: Acute  








Problems:  





Comment


Review of Relevant


I have reviewed the following items lore (where applicable) has been applied.


Labs





Laboratory Tests








Test


  2/13/17


16:56 2/13/17


22:09 2/14/17


04:30 2/14/17


08:19


 


Glucose (Fingerstick)


  201mg/dL


(70-99) 246mg/dL


(70-99) 


  176mg/dL


(70-99)


 


Sodium Level


  


  


  139mmol/L


(136-145) 


 


 


Potassium Level


  


  


  4.1mmol/L


(3.5-5.1) 


 


 


Chloride Level


  


  


  101mmol/L


() 


 


 


Carbon Dioxide Level


  


  


  29mmol/L


(21-32) 


 


 


Anion Gap   9 (6-14)  


 


Blood Urea Nitrogen   26mg/dL (7-20)  


 


Creatinine


  


  


  3.3mg/dL


(0.6-1.0) 


 


 


Estimated GFR


(Cockcroft-Gault) 


  


  14.3 


  


 


 


Glucose Level


  


  


  184mg/dL


(70-99) 


 


 


Calcium Level


  


  


  7.5mg/dL


(8.5-10.1) 


 


 


Phosphorus Level


  


  


  4.1mg/dL


(2.6-4.7) 


 


 


Albumin


  


  


  2.1g/dL


(3.4-5.0) 


 


 


Vitamin B12 Level


  


  


  633pg/mL


(211-946) 


 


 


Folic Acid (LAB)


  


  


  6.9ng/mL


(>3.0) 


 














Test


  2/14/17


11:11 2/14/17


17:14 2/14/17


20:47 2/15/17


05:23


 


Glucose (Fingerstick)


  189mg/dL


(70-99) 128mg/dL


(70-99) 268mg/dL


(70-99) 


 


 


White Blood Count


  


  


  


  6.7x10^3/uL


(4.0-11.0)


 


Red Blood Count


  


  


  


  2.34x10^6/uL


(3.50-5.40)


 


Hemoglobin


  


  


  


  7.2g/dL


(12.0-15.5)


 


Hematocrit


  


  


  


  22.0%


(36.0-47.0)


 


Mean Corpuscular Volume    94fL () 


 


Mean Corpuscular Hemoglobin    31pg (25-35) 


 


Mean Corpuscular Hemoglobin


Concent 


  


  


  33g/dL (31-37) 


 


 


Red Cell Distribution Width


  


  


  


  14.5%


(11.5-14.5)


 


Platelet Count


  


  


  


  176x10^3/uL


(140-400)


 


Neutrophils (%) (Auto)    60% (31-73) 


 


Lymphocytes (%) (Auto)    24% (24-48) 


 


Monocytes (%) (Auto)    11% (0-9) 


 


Eosinophils (%) (Auto)    4% (0-3) 


 


Basophils (%) (Auto)    1% (0-3) 


 


Neutrophils # (Auto)


  


  


  


  4.1x10^3uL


(1.8-7.7)


 


Lymphocytes # (Auto)


  


  


  


  1.6x10^3/uL


(1.0-4.8)


 


Monocytes # (Auto)


  


  


  


  0.7x10^3/uL


(0.0-1.1)


 


Eosinophils # (Auto)


  


  


  


  0.3x10^3/uL


(0.0-0.7)


 


Basophils # (Auto)


  


  


  


  0.1x10^3/uL


(0.0-0.2)


 


Sodium Level


  


  


  


  140mmol/L


(136-145)


 


Potassium Level


  


  


  


  4.3mmol/L


(3.5-5.1)


 


Chloride Level


  


  


  


  103mmol/L


()


 


Carbon Dioxide Level


  


  


  


  28mmol/L


(21-32)


 


Anion Gap    9 (6-14) 


 


Blood Urea Nitrogen    34mg/dL (7-20) 


 


Creatinine


  


  


  


  4.2mg/dL


(0.6-1.0)


 


Estimated GFR


(Cockcroft-Gault) 


  


  


  10.8 


 


 


Glucose Level


  


  


  


  138mg/dL


(70-99)


 


Calcium Level


  


  


  


  7.8mg/dL


(8.5-10.1)


 


Phosphorus Level


  


  


  


  4.9mg/dL


(2.6-4.7)


 


Albumin


  


  


  


  2.1g/dL


(3.4-5.0)














Test


  2/15/17


08:20 


  


  


 


 


Glucose (Fingerstick)


  151mg/dL


(70-99) 


  


  


 








Laboratory Tests








Test


  2/14/17


17:14 2/14/17


20:47 2/15/17


05:23 2/15/17


08:20


 


Glucose (Fingerstick)


  128mg/dL


(70-99) 268mg/dL


(70-99) 


  151mg/dL


(70-99)


 


White Blood Count


  


  


  6.7x10^3/uL


(4.0-11.0) 


 


 


Red Blood Count


  


  


  2.34x10^6/uL


(3.50-5.40) 


 


 


Hemoglobin


  


  


  7.2g/dL


(12.0-15.5) 


 


 


Hematocrit


  


  


  22.0%


(36.0-47.0) 


 


 


Mean Corpuscular Volume   94fL ()  


 


Mean Corpuscular Hemoglobin   31pg (25-35)  


 


Mean Corpuscular Hemoglobin


Concent 


  


  33g/dL (31-37) 


  


 


 


Red Cell Distribution Width


  


  


  14.5%


(11.5-14.5) 


 


 


Platelet Count


  


  


  176x10^3/uL


(140-400) 


 


 


Neutrophils (%) (Auto)   60% (31-73)  


 


Lymphocytes (%) (Auto)   24% (24-48)  


 


Monocytes (%) (Auto)   11% (0-9)  


 


Eosinophils (%) (Auto)   4% (0-3)  


 


Basophils (%) (Auto)   1% (0-3)  


 


Neutrophils # (Auto)


  


  


  4.1x10^3uL


(1.8-7.7) 


 


 


Lymphocytes # (Auto)


  


  


  1.6x10^3/uL


(1.0-4.8) 


 


 


Monocytes # (Auto)


  


  


  0.7x10^3/uL


(0.0-1.1) 


 


 


Eosinophils # (Auto)


  


  


  0.3x10^3/uL


(0.0-0.7) 


 


 


Basophils # (Auto)


  


  


  0.1x10^3/uL


(0.0-0.2) 


 


 


Sodium Level


  


  


  140mmol/L


(136-145) 


 


 


Potassium Level


  


  


  4.3mmol/L


(3.5-5.1) 


 


 


Chloride Level


  


  


  103mmol/L


() 


 


 


Carbon Dioxide Level


  


  


  28mmol/L


(21-32) 


 


 


Anion Gap   9 (6-14)  


 


Blood Urea Nitrogen   34mg/dL (7-20)  


 


Creatinine


  


  


  4.2mg/dL


(0.6-1.0) 


 


 


Estimated GFR


(Cockcroft-Gault) 


  


  10.8 


  


 


 


Glucose Level


  


  


  138mg/dL


(70-99) 


 


 


Calcium Level


  


  


  7.8mg/dL


(8.5-10.1) 


 


 


Phosphorus Level


  


  


  4.9mg/dL


(2.6-4.7) 


 


 


Albumin


  


  


  2.1g/dL


(3.4-5.0) 


 








Microbiology


2/10/17 Blood Culture - Final, Complete


          NO GROWTH AFTER 5 DAYS


2/12/17 Urine Culture - Final, Complete


          


2/12/17 Urine Culture Result 1 (SEAN) - Final, Complete


Medications





 Current Medications


Aspirin 324 mg 324 mg 1X  ONCE PO  Last administered on 2/7/17at 12:59;  Start 2 /7/17 at 12:45;  Stop 2/7/17 at 12:48;  Status DC


Heparin Sodium/ Dextrose 500 ml @  19 mls/hr CONT  PRN IV SEE I/O RECORD Last 

administered on 2/8/17at 14:45;  Start 2/7/17 at 12:45;  Stop 2/9/17 at 15:30;  

Status DC


Heparin Sodium (Porcine) 1,950 unit PRN Q6HRS  PRN IV FOR UFH LEVEL LESS THAN 

0.2 Last administered on 2/7/17at 13:01;  Start 2/7/17 at 12:45;  Stop 2/9/17 

at 15:30;  Status DC


Ondansetron HCl (Zofran) 4 mg PRN Q8HRS  PRN IV NAUSEA/VOMITING;  Start 2/7/17 

at 12:45;  Stop 2/8/17 at 12:44;  Status DC


Morphine Sulfate 4 mg PRN Q2HR  PRN IV PAIN Last administered on 2/7/17at 14:03

;  Start 2/7/17 at 12:45;  Stop 2/8/17 at 12:44;  Status DC


Nitroglycerin (Nitrostat) 0.4 mg PRN Q5MIN  PRN SL CHEST PAIN Last administered 

on 2/7/17at 14:03;  Start 2/7/17 at 12:45;  Stop 2/8/17 at 12:44;  Status DC


Amlodipine Besylate (Norvasc) 5 mg DAILYWLUN PO  Last administered on 2/8/17at 

08:32;  Start 2/7/17 at 14:00;  Stop 2/8/17 at 16:02;  Status DC


Aspirin (Ecotrin) 81 mg DAILYWBKFT PO  Last administered on 2/13/17at 13:07;  

Start 2/7/17 at 14:00


Atorvastatin Calcium (Lipitor) 80 mg QHS PO  Last administered on 2/14/17at 21:

23;  Start 2/7/17 at 21:00


Carvedilol (Coreg) 25 mg BIDWMEALS PO  Last administered on 2/14/17at 17:00;  

Start 2/7/17 at 17:00


Clopidogrel Bisulfate (Plavix) 75 mg DAILY PO  Last administered on 2/7/17at 14:

28;  Start 2/7/17 at 14:00;  Stop 2/8/17 at 08:41;  Status DC


Isosorbide Mononitrate (Imdur) 30 mg DAILY PO  Last administered on 2/13/17at 13

:09;  Start 2/7/17 at 14:00


Furosemide (Lasix) 40 mg 1X  ONCE IVP  Last administered on 2/7/17at 14:30;  

Start 2/7/17 at 14:00;  Stop 2/7/17 at 14:23;  Status DC


Furosemide 40 mg 40 mg 1X  ONCE IVP  Last administered on 2/7/17at 14:30;  

Start 2/7/17 at 14:30;  Stop 2/7/17 at 14:38;  Status DC


Nitroglycerin/ Dextrose (Nitroglycerin Drip) 250 ml @ 0 mls/hr CONT  PRN IV SEE 

I/O RECORD Last administered on 2/7/17at 15:23;  Start 2/7/17 at 14:30;  Stop 2/ 8/17 at 16:02;  Status DC


Acetaminophen (Tylenol) 325 mg PRN Q6HRS  PRN PO MILD PAIN / TEMP Last 

administered on 2/13/17at 21:59;  Start 2/7/17 at 15:00


Acetaminophen/ Hydrocodone Bitart (Lortab 5/325) 1 tab PRN Q6HRS  PRN PO 

MODERATE TO SEVERE PAIN Last administered on 2/14/17at 21:22;  Start 2/7/17 at 

15:00


Hydralazine HCl (Apresoline) 10 mg PRN Q4HRS  PRN IVP ELEVATED BP, SEE COMMENTS

;  Start 2/7/17 at 15:00


Ondansetron HCl (Zofran) 4 mg PRN Q8HRS  PRN IV NAUSEA/VOMITING;  Start 2/7/17 

at 15:00


Albuterol Sulfate (Ventolin Neb Soln) 2.5 mg PRN Q4HRS  PRN NEB SHORTNESS OF 

BREATH;  Start 2/7/17 at 15:00


Insulin Aspart (Novolog) 0-7 UNITS TIDWMEALS SQ  Last administered on 2/13/17at 

17:45;  Start 2/7/17 at 17:00


Dextrose 12.5 gm PRN Q15MIN  PRN IV SEE COMMENTS;  Start 2/7/17 at 15:00


Info 1 each 1 each PRN DAILY  PRN MC SEE COMMENTS Last administered on 2/9/17at 

09:38;  Start 2/7/17 at 17:00;  Stop 2/10/17 at 08:25;  Status DC


Magnesium Sulfate/ Dextrose (Magnesium Sulfate PREMIX 2GM) 50 ml @ 25 mls/hr 

PRN DAILY  PRN IV for Mag < 1.7 on am labs;  Start 2/8/17 at 11:15


Lidocaine/Sodium Bicarbonate (Buffered Lidocaine 1%) 3 ml 1X  ONCE IJ  Last 

administered on 2/8/17at 14:03;  Start 2/8/17 at 12:30;  Stop 2/8/17 at 12:34;  

Status DC


Heparin Sodium (Porcine) 2,400 unit 1X  ONCE INT CAT  Last administered on 2/8/ 17at 14:04;  Start 2/8/17 at 12:30;  Stop 2/8/17 at 12:34;  Status DC


Heparin Sodium/ Sodium Chloride 60 unit 1X  ONCE IV  Last administered on 2/8/ 17at 14:04;  Start 2/8/17 at 12:30;  Stop 2/8/17 at 12:34;  Status DC


Lisinopril (Prinivil) 20 mg DAILY PO  Last administered on 2/12/17at 09:22;  

Start 2/8/17 at 16:00;  Stop 2/13/17 at 10:31;  Status DC


Hydralazine HCl 50 mg 50 mg BID PO  Last administered on 2/12/17at 20:55;  

Start 2/8/17 at 21:00;  Stop 2/13/17 at 10:31;  Status DC


Sodium Chloride (Iv Sodium Chloride 0.9% 1000ml Bag) 1,000 ml @  1,000 mls/hr 

Q1H PRN IV hypotension;  Start 2/8/17 at 18:28;  Stop 2/9/17 at 00:27;  Status 

DC


Diphenhydramine HCl (Benadryl) 25 mg 1X PRN  PRN IV ITCHING;  Start 2/8/17 at 18

:30;  Stop 2/9/17 at 18:29;  Status DC


Diphenhydramine HCl (Benadryl) 25 mg 1X PRN  PRN IV ITCHING;  Start 2/8/17 at 18

:30;  Stop 2/9/17 at 18:29;  Status DC


Sodium Chloride (Normal Saline Flush) 10 ml 1X PRN  PRN IV AP catheter pack;  

Start 2/8/17 at 18:30;  Stop 2/9/17 at 18:29;  Status DC


Sodium Chloride 10 ml 10 ml 1X PRN  PRN IV  catheter pack;  Start 2/8/17 at 18

:30;  Stop 2/9/17 at 18:29;  Status DC


Sodium Chloride (Iv Sodium Chloride 0.9% 1000ml Bag) 1,000 ml @  400 mls/hr 

Q2H30M PRN IV PATENCY;  Start 2/8/17 at 18:28;  Stop 2/9/17 at 06:27;  Status DC


Info (PHARMACY MONITORING -- do not chart) 1 each PRN DAILY  PRN MC SEE COMMENTS

;  Start 2/8/17 at 18:30


Info (PHARMACY MONITORING -- do not chart) 1 each PRN DAILY  PRN MC SEE COMMENTS

;  Start 2/9/17 at 10:30;  Status UNV


Info (PHARMACY MONITORING -- do not chart) 1 each PRN DAILY  PRN MC SEE COMMENTS

;  Start 2/9/17 at 10:30;  Status Cancel


Darbepoetin Valeriano (Aranesp) 60 mcg WEEKLYHS SQ  Last administered on 2/9/17at 20:

48;  Start 2/9/17 at 21:00


Clopidogrel Bisulfate (Plavix) 75 mg DAILYWBKFT PO ;  Start 2/10/17 at 08:00;  

Stop 2/10/17 at 08:00;  Status DC


Clopidogrel Bisulfate 75 mg 75 mg DAILYWBKFT PO  Last administered on 2/10/17at 

08:25;  Start 2/9/17 at 15:30;  Stop 2/11/17 at 18:54;  Status DC


Sodium Chloride (Iv Sodium Chloride 0.9% 1000ml Bag) 1,000 ml @  1,000 mls/hr 

Q1H PRN IV hypotension;  Start 2/10/17 at 08:11;  Stop 2/10/17 at 15:00;  

Status DC


Diphenhydramine HCl (Benadryl) 25 mg 1X PRN  PRN IV ITCHING;  Start 2/10/17 at 

08:15;  Stop 2/10/17 at 15:00;  Status DC


Diphenhydramine HCl (Benadryl) 25 mg 1X PRN  PRN IV ITCHING;  Start 2/10/17 at 

08:15;  Stop 2/10/17 at 15:00;  Status DC


Sodium Chloride (Normal Saline Flush) 10 ml 1X PRN  PRN IV AP catheter pack;  

Start 2/10/17 at 08:15;  Stop 2/10/17 at 15:00;  Status DC


Sodium Chloride 10 ml 10 ml 1X PRN  PRN IV  catheter pack;  Start 2/10/17 at 

08:15;  Stop 2/10/17 at 15:00;  Status DC


Sodium Chloride (Iv Sodium Chloride 0.9% 1000ml Bag) 1,000 ml @  400 mls/hr 

Q2H30M PRN IV PATENCY;  Start 2/10/17 at 08:11;  Stop 2/10/17 at 21:00;  Status 

DC


Info 1 each 1 each PRN DAILY  PRN MC SEE COMMENTS;  Start 2/10/17 at 08:15;  

Status UNV


Magnesium Sulfate/ Dextrose 100 ml @  100 mls/hr 1X  ONCE IV  Last administered 

on 2/10/17at 12:36;  Start 2/10/17 at 10:30;  Stop 2/10/17 at 11:29;  Status DC


Ceftriaxone Sodium 1 gm/ Sodium Chloride 50 ml @  100 mls/hr Q24H IV  Last 

administered on 2/14/17at 16:00;  Start 2/12/17 at 16:00


Sodium Chloride 1,000 ml @  1,000 mls/hr Q1H PRN IV hypotension;  Start 2/13/17 

at 08:48;  Stop 2/13/17 at 14:47;  Status DC


Sodium Chloride (Iv Sodium Chloride 0.9% 1000ml Bag) 1,000 ml @  400 mls/hr 

Q2H30M PRN IV PATENCY;  Start 2/13/17 at 08:48;  Stop 2/13/17 at 20:47;  Status 

DC


Info (PHARMACY MONITORING -- do not chart) 1 each PRN DAILY  PRN MC SEE COMMENTS

;  Start 2/13/17 at 09:00;  Status UNV


Lisinopril (Prinivil) 40 mg DAILY PO ;  Start 2/14/17 at 09:00


Cyanocobalamin (Vitamin B-12) 1,000 mcg DAILY PO  Last administered on 2/13/ 17at 16:09;  Start 2/13/17 at 15:30


Ferrous Sulfate (Feosol) 325 mg BID PO  Last administered on 2/14/17at 21:23;  

Start 2/13/17 at 21:00


Heparin Sodium (Porcine) 10,000 unit STK-MED ONCE .ROUTE ;  Start 2/14/17 at 17:

14;  Stop 2/14/17 at 17:15;  Status DC


Lidocaine/ Epinephrine 20 ml 20 ml STK-MED ONCE .ROUTE ;  Start 2/14/17 at 17:14

;  Stop 2/14/17 at 17:15;  Status DC


Heparin Sodium/ Sodium Chloride 500 ml @  As Directed STK-MED ONCE .ROUTE ;  

Start 2/14/17 at 17:14;  Stop 2/14/17 at 17:15;  Status DC


Fentanyl Citrate (Fentanyl 2ml Vial) 100 mcg STK-MED ONCE .ROUTE ;  Start 2/14/ 17 at 17:35;  Stop 2/14/17 at 17:36;  Status DC


Midazolam HCl 2 mg 2 mg STK-MED ONCE .ROUTE ;  Start 2/14/17 at 17:35;  Stop 2/ 14/17 at 17:36;  Status DC


Cefazolin Sodium (Ancef 1gm Ivpb For Omni) 50 ml @ As Directed STK-MED ONCE IV 

;  Start 2/14/17 at 17:36;  Stop 2/14/17 at 17:37;  Status DC


Heparin Sodium/ Sodium Chloride 1,000 unit 1X  ONCE IART  Last administered on 2 /14/17at 18:04;  Start 2/14/17 at 18:00;  Stop 2/14/17 at 18:02;  Status DC


Midazolam HCl (Versed) 2 mg 1X  ONCE IV  Last administered on 2/14/17at 18:04;  

Start 2/14/17 at 18:00;  Stop 2/14/17 at 18:02;  Status DC


Fentanyl Citrate (Fentanyl 2ml Vial) 100 mcg 1X  ONCE IV  Last administered on 2 /14/17at 18:04;  Start 2/14/17 at 18:00;  Stop 2/14/17 at 18:02;  Status DC


Heparin Sodium (Porcine) 4300 unit 4,300 unit 1X  ONCE IV  Last administered on 

2/14/17at 18:05;  Start 2/14/17 at 18:00;  Stop 2/14/17 at 18:02;  Status DC


Cefazolin Sodium (Ancef 1gm Ivpb For Omni) 50 ml @  100 mls/hr 1X  ONCE IV  

Last administered on 2/14/17at 18:06;  Start 2/14/17 at 18:00;  Stop 2/14/17 at 

18:29;  Status DC


Lidocaine/ Epinephrine (Xylocaine 1%-Epi 1:100,000) 10 ml 1X  ONCE IJ  Last 

administered on 2/14/17at 18:05;  Start 2/14/17 at 18:00;  Stop 2/14/17 at 18:02

;  Status DC





Active Scripts


Active


Lisinopril 20 Mg Tablet 20 Mg PO QHS


Isosorbide Mononitrate Er (Isosorbide Mononitrate) 30 Mg Tab.er.24h 30 Mg PO 

DAILY


Hydralazine Hcl 50 Mg Tablet 100 Mg PO TID


Carvedilol 12.5 Mg Tablet 25 Mg PO BIDWMEALS


Atorvastatin Calcium 40 Mg Tablet 80 Mg PO QHS


Aspirin Ec (Aspirin) 81 Mg Tablet.dr 81 Mg PO DAILYWBKFT


Amlodipine Besylate 5 Mg Tablet 5 Mg PO DAILYWLUN


Reported


Clopidogrel (Clopidogrel Bisulfate) 75 Mg Tablet 75 Mg PO DAILY


Glyburide 5 Mg Tablet 1 Tab PO BIDWMEALS


Lisinopril 20 Mg Tablet 1 Tab PO DAILY


Vitals/I & O





 Vital Sign - Last 24 Hours








 2/14/17 2/14/17 2/14/17 2/14/17





 15:01 17:00 18:01 18:04


 


Temp 98.3   





 98.3   


 


Pulse 71 74 74 


 


Resp 18  16 16


 


B/P 146/67 175/80  


 


Pulse Ox 98  100 100


 


O2 Delivery Room Air  Nasal Cannula Nasal Cannula


 


O2 Flow Rate   2.0 2.0


 


    





    





 2/14/17 2/14/17 2/15/17 2/15/17





 19:26 20:00 00:04 02:25


 


Temp 97.9  98.2 97.9





 97.9  98.2 97.9


 


Pulse 75  77 77


 


Resp 16  18 20


 


B/P 156/58  168/71 172/74


 


Pulse Ox 96  97 97


 


O2 Delivery Room Air Room Air Room Air Room Air


 


    





    





 2/15/17 2/15/17 2/15/17 





 07:30 07:48 08:17 


 


Temp 98.0   





 98.0   


 


Pulse 71   


 


Resp 18   


 


B/P 161/59   


 


Pulse Ox 95  95 


 


O2 Delivery Room Air Room Air Room Air 














 Intake and Output   


 


 2/14/17 2/14/17 2/15/17





 15:00 23:00 07:00


 


Intake Total  0 ml 


 


Output Total   200 ml


 


Balance  0 ml -200 ml














ARTEMIO NUNES MD Feb 15, 2017 13:45

## 2017-02-15 NOTE — PDOC
Renal-Progress Notes


Subjective Notes


Notes


NONE





History of Present Illness


Hx of present illness


NO CHANGE





Vitals


Vitals





 Vital Signs








  Date Time  Temp Pulse Resp B/P Pulse Ox O2 Delivery O2 Flow Rate FiO2


 


2/15/17 08:17     95 Room Air  


 


2/15/17 07:30 98.0 71 18 161/59    





 98.0       


 


2/14/17 18:04       2.0 








Weight


Weight [ ]





I.O.


Intake and Output











 Intake and Output 


 


 2/15/17





 06:59


 


Intake Total 0 ml


 


Output Total 200 ml


 


Balance -200 ml


 


 


 


Intake Oral 0 ml


 


Output Urine Total 200 ml


 


# Voids 1











Labs


Labs





Laboratory Tests








Test


  2/14/17


17:14 2/14/17


20:47 2/15/17


05:23 2/15/17


08:20


 


Glucose (Fingerstick)


  128mg/dL


(70-99) 268mg/dL


(70-99) 


  151mg/dL


(70-99)


 


White Blood Count


  


  


  6.7x10^3/uL


(4.0-11.0) 


 


 


Red Blood Count


  


  


  2.34x10^6/uL


(3.50-5.40) 


 


 


Hemoglobin


  


  


  7.2g/dL


(12.0-15.5) 


 


 


Hematocrit


  


  


  22.0%


(36.0-47.0) 


 


 


Mean Corpuscular Volume   94fL ()  


 


Mean Corpuscular Hemoglobin   31pg (25-35)  


 


Mean Corpuscular Hemoglobin


Concent 


  


  33g/dL (31-37) 


  


 


 


Red Cell Distribution Width


  


  


  14.5%


(11.5-14.5) 


 


 


Platelet Count


  


  


  176x10^3/uL


(140-400) 


 


 


Neutrophils (%) (Auto)   60% (31-73)  


 


Lymphocytes (%) (Auto)   24% (24-48)  


 


Monocytes (%) (Auto)   11% (0-9)  


 


Eosinophils (%) (Auto)   4% (0-3)  


 


Basophils (%) (Auto)   1% (0-3)  


 


Neutrophils # (Auto)


  


  


  4.1x10^3uL


(1.8-7.7) 


 


 


Lymphocytes # (Auto)


  


  


  1.6x10^3/uL


(1.0-4.8) 


 


 


Monocytes # (Auto)


  


  


  0.7x10^3/uL


(0.0-1.1) 


 


 


Eosinophils # (Auto)


  


  


  0.3x10^3/uL


(0.0-0.7) 


 


 


Basophils # (Auto)


  


  


  0.1x10^3/uL


(0.0-0.2) 


 


 


Sodium Level


  


  


  140mmol/L


(136-145) 


 


 


Potassium Level


  


  


  4.3mmol/L


(3.5-5.1) 


 


 


Chloride Level


  


  


  103mmol/L


() 


 


 


Carbon Dioxide Level


  


  


  28mmol/L


(21-32) 


 


 


Anion Gap   9 (6-14)  


 


Blood Urea Nitrogen   34mg/dL (7-20)  


 


Creatinine


  


  


  4.2mg/dL


(0.6-1.0) 


 


 


Estimated GFR


(Cockcroft-Gault) 


  


  10.8 


  


 


 


Glucose Level


  


  


  138mg/dL


(70-99) 


 


 


Calcium Level


  


  


  7.8mg/dL


(8.5-10.1) 


 


 


Phosphorus Level


  


  


  4.9mg/dL


(2.6-4.7) 


 


 


Albumin


  


  


  2.1g/dL


(3.4-5.0) 


 











Micro


Micro





Microbiology


2/10/17 Blood Culture - Final, Complete


          NO GROWTH AFTER 5 DAYS


2/12/17 Urine Culture - Final, Complete


          


2/12/17 Urine Culture Result 1 (SEAN) - Final, Complete





Review of Systems


Constitutional:  yes: alert, oriented


Ears/Nose/Throat:  Yes: no symptom reported


Eyes:  Yes: no symptom reported


Pulmonary:  Yes no symptom reported


Cardiovascular:  Yes no symptom reported


Gastrointestional:  Yes: constipation


Genitourinary:  Yes: no symptom reported


Musculoskeletal:  Yes: muscle stiffness





Physical Exam


General Appearance:  no apparent distress


Skin:  warm


Respiratory:  bilateral CTA


Heart:  S1S2, RRR


Abdomen:  soft, bowel sounds present


Neurology:  alert, oriented





Assessment


Assessment


IMP





NEW ESRD


ANEMIA





PLAN





HD TODAY


UF TO DW


WILL SET UP OP HD AT [facility] WHERE HER  DIALYZES


D/W WITH CM


WILL FOLLOW








JANE CAMARILLO MD Feb 15, 2017 11:26

## 2017-02-15 NOTE — RAD
Removal of right IJ temporary hemodialysis catheter 



Ultrasound and fluoro guided placement of right IJ tunneled hemodialysis

catheter



Indication: 59-year-old female with acute kidney injury on chronic kidney

disease.  End stage renal disease.  Ongoing hemodialysis required. Conversion

from temporary to tunneled hemodialysis catheter has been requested by renal.



Fluoro time: 1.1 minutes



Kerma-Area Product: 2 Gycm2



Moderate sedation: 28 minutes moderate sedation was provided utilizing a total

of 2 mg Versed and 100 mcg fentanyl, IV. The patient was appropriately

monitored by a qualified independent observer throughout the time of moderate

sedation.



Antibiotic: A single dose of Ancef was administered within 1 hour of the

procedure start time.



Sterility: All elements of maximal sterile barrier technique, including the

use of a cap, mask, sterile gown, sterile gloves, large sterile sheet,

appropriate hand hygiene, and 2% chlorhexidine for cutaneous antisepsis (or

acceptable alternative antiseptic per current guidelines) were utilized.





Procedure: Informed consent was obtained from the patient, via [place]

..  She was placed supine on the angiography table.  Preliminary

ultrasound examination of right neck revealed continued wide patency of right

internal jugular vein, which was documented with a hard copy ultrasound image.

 The indwelling right IJ 14 Colombian 20 cm Schon temporary hemodialysis catheter

was then easily removed utilizing gentle traction. Hemostasis was achieved

with manual pressure over right internal jugular vein.  Right neck and upper

chest were then prepped and draped in the usual sterile fashion, utilizing all

elements of maximal sterile barrier technique, as described above.  Moderate

sedation was provided with IV Versed and Fentanyl.  1 gram Ancef was given IV,

prophylactically.  Using aseptic technique and local anesthesia, a small skin

incision was made lateral to right internal jugular vein, just above clavicle.

 Using aseptic technique, local anesthesia, and direct ultrasound guidance, a

micropuncture needle was successfully introduced into right internal jugular

vein. The micropuncture needle was then exchanged over a microguidewire for a

micropuncture sheath, through which an Amplatz wire was advanced into IVC,

under fluoroscopic control.  A second small skin incision was then made along

upper anterior aspect of right chest.  A subcutaneous tunnel was then

fashioned between the right chest and supraclavicular incisions.  A 15.5 F 28

cm Dura Max dialysis catheter was pulled through the subcutaneous tunnel from

inferior to superior, utilizing the tunneling device provided.  The right IJ

venostomy tract was then sequentially dilated and the 15.5 Colombian dialysis

catheter was easily advanced centrally through a 16 Colombian peel-away sheath,

and was positioned with its tip at the level of upper right atrium utilizing

fluoroscopic guidance.  This catheter was demonstrated to flush and aspirate

normally, was packed, and was secured at the right chest exit site utilizing

2-0 Prolene and sterile dressing.  The small supraclavicular incision was

closed with 4-0 Vicryl, Steri-Strips, and sterile dressing.  Patient tolerated

the procedure well without apparent complication.  Satisfactory position of

the dialysis catheter was confirmed with a single fluoroscopic spot image. 



Impression: Successful, uneventful ultrasound and fluoro guided placement of

right IJ 15.5 F 28 cm Dura Max tunneled hemodialysis catheter, following

removal of right IJ 14 Colombian 20 cm Schon temporary hemodialysis catheter, as

described.

## 2017-02-16 NOTE — PDOC
Renal-Progress Notes


Subjective Notes


Notes


NONE





History of Present Illness


Hx of present illness


NO CHANGE





Vitals


Vitals





 Vital Signs








  Date Time  Temp Pulse Resp B/P Pulse Ox O2 Delivery O2 Flow Rate FiO2


 


2/16/17 11:08  70  149/50    


 


2/16/17 07:33      Room Air  


 


2/16/17 07:00 98.6  18  94   





 98.6       








Weight


Weight [ ]





I.O.


Intake and Output











 Intake and Output 


 


 2/16/17





 07:00


 


Intake Total 240 ml


 


Output Total 100 ml


 


Balance 140 ml


 


 


 


Intake Oral 240 ml


 


Output Urine Total 100 ml


 


# Voids 3











Labs


Labs





Laboratory Tests








Test


  2/15/17


16:56 2/15/17


20:49 2/16/17


08:30


 


Glucose (Fingerstick)


  345mg/dL


(70-99) 250mg/dL


(70-99) 225mg/dL


(70-99)











Micro


Micro





Microbiology


2/10/17 Blood Culture - Final, Complete


          NO GROWTH AFTER 5 DAYS


2/12/17 Urine Culture - Final, Complete


          


2/12/17 Urine Culture Result 1 (SEAN) - Final, Complete





Review of Systems


Constitutional:  yes: alert, oriented


Ears/Nose/Throat:  Yes: no symptom reported


Eyes:  Yes: no symptom reported


Pulmonary:  Yes no symptom reported


Cardiovascular:  Yes no symptom reported


Gastrointestional:  Yes: constipation


Genitourinary:  Yes: no symptom reported


Musculoskeletal:  Yes: muscle stiffness





Physical Exam


General Appearance:  no apparent distress


Skin:  warm


Respiratory:  bilateral CTA


Heart:  S1S2, RRR


Abdomen:  soft, bowel sounds present


Neurology:  alert, oriented





Assessment


Assessment


IMP





NEW ESRD


ANEMIA


S/P NEW TUNNELED HD CATHETER





PLAN





HD TOMORROW


WILL SET UP OP HD AT [facility] WHERE HER  DIALYZES


D/W WITH JANE LINARES MD Feb 16, 2017 11:29

## 2017-02-16 NOTE — PDOC
PROGRESS NOTES


Chief Complaint


Chief Complaint


cc: sob 





A//P


1.  Acute on chronic systolic congestive heart failure. improved 


2.  Non-ST-segment elevation myocardial infarction.


3.  WALTER with CKD


4.  Ischemic cardiomyopathy.


5.  Malignant hypertension, Better


6.  Anemia


7.  Hyperlipidemia.


8.  Psoriasis.


9. CAD


10. Anemia


11. UTI not POA


12. DM2





Plan 


IV Rocephin, fu ucx


HD per  nephrology 


SW to arrange out pt HD


BP controlled, 


low hemoglobin but pt declined to have blood products due to Baptism reasons. 

risks and benefits explained. 


check vitb12, fa, add iron po


SSI 


Lipitor 


labs reviewed 


Monitor hemoglobin 


Prognosis guarded. 


talked to daughter at bedside


CHECK hba1c, add glimepiride 1mg daily


talked to CARD, NO intervention now given low HB, PT WILL get HD perm cath today

, dc in 1-2ds when outpt HD set up





History of Present Illness


History of Present Illness


sob better


no fever


no chills. 


no chest pain 





still dysuria, much better. no frequent urination





Vitals


Vitals





 Vital Signs








  Date Time  Temp Pulse Resp B/P Pulse Ox O2 Delivery O2 Flow Rate FiO2


 


2/16/17 14:51 98.8 67 18 134/47 96 Nasal Cannula  





 98.8       











Physical Exam


General:  Alert, Oriented X3


Heart:  Regular rate, Normal S1, Normal S2, No murmurs, Gallops


Lungs:  Clear, Other


Abdomen:  Normal bowel sounds, Soft, No tenderness, No hepatosplenomegaly, No 

masses


Extremities:  No clubbing, No cyanosis, No edema, Normal pulses, No tenderness/

swelling


Skin:  No breakdown, No significant lesion, Other (psoriasis )





Labs


LABS





Laboratory Tests








Test


  2/15/17


16:56 2/15/17


20:49 2/16/17


08:30 2/16/17


11:02


 


Glucose (Fingerstick)


  345mg/dL


(70-99) 250mg/dL


(70-99) 225mg/dL


(70-99) 315mg/dL


(70-99)











Review of Systems


Review of Systems


no fever, chills, sob or chest pain





Assessment and Plan


Assessmemt and Plan


 Problems


Medical Problems:


(1) Congestive heart failure


Status: Acute  





(2) NSTEMI (non-ST elevated myocardial infarction)


Status: Acute  








Problems:  





Comment


Review of Relevant


I have reviewed the following items lore (where applicable) has been applied.


Labs





Laboratory Tests








Test


  2/14/17


17:14 2/14/17


20:47 2/15/17


05:23 2/15/17


08:20


 


Glucose (Fingerstick)


  128mg/dL


(70-99) 268mg/dL


(70-99) 


  151mg/dL


(70-99)


 


White Blood Count


  


  


  6.7x10^3/uL


(4.0-11.0) 


 


 


Red Blood Count


  


  


  2.34x10^6/uL


(3.50-5.40) 


 


 


Hemoglobin


  


  


  7.2g/dL


(12.0-15.5) 


 


 


Hematocrit


  


  


  22.0%


(36.0-47.0) 


 


 


Mean Corpuscular Volume   94fL ()  


 


Mean Corpuscular Hemoglobin   31pg (25-35)  


 


Mean Corpuscular Hemoglobin


Concent 


  


  33g/dL (31-37) 


  


 


 


Red Cell Distribution Width


  


  


  14.5%


(11.5-14.5) 


 


 


Platelet Count


  


  


  176x10^3/uL


(140-400) 


 


 


Neutrophils (%) (Auto)   60% (31-73)  


 


Lymphocytes (%) (Auto)   24% (24-48)  


 


Monocytes (%) (Auto)   11% (0-9)  


 


Eosinophils (%) (Auto)   4% (0-3)  


 


Basophils (%) (Auto)   1% (0-3)  


 


Neutrophils # (Auto)


  


  


  4.1x10^3uL


(1.8-7.7) 


 


 


Lymphocytes # (Auto)


  


  


  1.6x10^3/uL


(1.0-4.8) 


 


 


Monocytes # (Auto)


  


  


  0.7x10^3/uL


(0.0-1.1) 


 


 


Eosinophils # (Auto)


  


  


  0.3x10^3/uL


(0.0-0.7) 


 


 


Basophils # (Auto)


  


  


  0.1x10^3/uL


(0.0-0.2) 


 


 


Sodium Level


  


  


  140mmol/L


(136-145) 


 


 


Potassium Level


  


  


  4.3mmol/L


(3.5-5.1) 


 


 


Chloride Level


  


  


  103mmol/L


() 


 


 


Carbon Dioxide Level


  


  


  28mmol/L


(21-32) 


 


 


Anion Gap   9 (6-14)  


 


Blood Urea Nitrogen   34mg/dL (7-20)  


 


Creatinine


  


  


  4.2mg/dL


(0.6-1.0) 


 


 


Estimated GFR


(Cockcroft-Gault) 


  


  10.8 


  


 


 


Glucose Level


  


  


  138mg/dL


(70-99) 


 


 


Calcium Level


  


  


  7.8mg/dL


(8.5-10.1) 


 


 


Phosphorus Level


  


  


  4.9mg/dL


(2.6-4.7) 


 


 


Albumin


  


  


  2.1g/dL


(3.4-5.0) 


 














Test


  2/15/17


16:56 2/15/17


20:49 2/16/17


08:30 2/16/17


11:02


 


Glucose (Fingerstick)


  345mg/dL


(70-99) 250mg/dL


(70-99) 225mg/dL


(70-99) 315mg/dL


(70-99)








Laboratory Tests








Test


  2/15/17


16:56 2/15/17


20:49 2/16/17


08:30 2/16/17


11:02


 


Glucose (Fingerstick)


  345mg/dL


(70-99) 250mg/dL


(70-99) 225mg/dL


(70-99) 315mg/dL


(70-99)








Microbiology


2/10/17 Blood Culture - Final, Complete


          NO GROWTH AFTER 5 DAYS


2/12/17 Urine Culture - Final, Complete


          


2/12/17 Urine Culture Result 1 (SEAN) - Final, Complete


Medications





 Current Medications


Aspirin 324 mg 324 mg 1X  ONCE PO  Last administered on 2/7/17at 12:59;  Start 2 /7/17 at 12:45;  Stop 2/7/17 at 12:48;  Status DC


Heparin Sodium/ Dextrose 500 ml @  19 mls/hr CONT  PRN IV SEE I/O RECORD Last 

administered on 2/8/17at 14:45;  Start 2/7/17 at 12:45;  Stop 2/9/17 at 15:30;  

Status DC


Heparin Sodium (Porcine) 1,950 unit PRN Q6HRS  PRN IV FOR UFH LEVEL LESS THAN 

0.2 Last administered on 2/7/17at 13:01;  Start 2/7/17 at 12:45;  Stop 2/9/17 

at 15:30;  Status DC


Ondansetron HCl (Zofran) 4 mg PRN Q8HRS  PRN IV NAUSEA/VOMITING;  Start 2/7/17 

at 12:45;  Stop 2/8/17 at 12:44;  Status DC


Morphine Sulfate 4 mg PRN Q2HR  PRN IV PAIN Last administered on 2/7/17at 14:03

;  Start 2/7/17 at 12:45;  Stop 2/8/17 at 12:44;  Status DC


Nitroglycerin (Nitrostat) 0.4 mg PRN Q5MIN  PRN SL CHEST PAIN Last administered 

on 2/7/17at 14:03;  Start 2/7/17 at 12:45;  Stop 2/8/17 at 12:44;  Status DC


Amlodipine Besylate (Norvasc) 5 mg DAILYWLUN PO  Last administered on 2/8/17at 

08:32;  Start 2/7/17 at 14:00;  Stop 2/8/17 at 16:02;  Status DC


Aspirin (Ecotrin) 81 mg DAILYWBKFT PO  Last administered on 2/16/17at 11:03;  

Start 2/7/17 at 14:00


Atorvastatin Calcium (Lipitor) 80 mg QHS PO  Last administered on 2/15/17at 21:

09;  Start 2/7/17 at 21:00


Carvedilol (Coreg) 25 mg BIDWMEALS PO  Last administered on 2/16/17at 11:04;  

Start 2/7/17 at 17:00


Clopidogrel Bisulfate (Plavix) 75 mg DAILY PO  Last administered on 2/7/17at 14:

28;  Start 2/7/17 at 14:00;  Stop 2/8/17 at 08:41;  Status DC


Isosorbide Mononitrate (Imdur) 30 mg DAILY PO  Last administered on 2/16/17at 11

:03;  Start 2/7/17 at 14:00


Furosemide (Lasix) 40 mg 1X  ONCE IVP  Last administered on 2/7/17at 14:30;  

Start 2/7/17 at 14:00;  Stop 2/7/17 at 14:23;  Status DC


Furosemide 40 mg 40 mg 1X  ONCE IVP  Last administered on 2/7/17at 14:30;  

Start 2/7/17 at 14:30;  Stop 2/7/17 at 14:38;  Status DC


Nitroglycerin/ Dextrose (Nitroglycerin Drip) 250 ml @ 0 mls/hr CONT  PRN IV SEE 

I/O RECORD Last administered on 2/7/17at 15:23;  Start 2/7/17 at 14:30;  Stop 2/ 8/17 at 16:02;  Status DC


Acetaminophen (Tylenol) 325 mg PRN Q6HRS  PRN PO MILD PAIN / TEMP Last 

administered on 2/13/17at 21:59;  Start 2/7/17 at 15:00


Acetaminophen/ Hydrocodone Bitart (Lortab 5/325) 1 tab PRN Q6HRS  PRN PO 

MODERATE TO SEVERE PAIN Last administered on 2/15/17at 21:18;  Start 2/7/17 at 

15:00


Hydralazine HCl (Apresoline) 10 mg PRN Q4HRS  PRN IVP ELEVATED BP, SEE COMMENTS

;  Start 2/7/17 at 15:00


Ondansetron HCl (Zofran) 4 mg PRN Q8HRS  PRN IV NAUSEA/VOMITING;  Start 2/7/17 

at 15:00


Albuterol Sulfate (Ventolin Neb Soln) 2.5 mg PRN Q4HRS  PRN NEB SHORTNESS OF 

BREATH;  Start 2/7/17 at 15:00


Insulin Aspart (Novolog) 0-7 UNITS TIDWMEALS SQ  Last administered on 2/16/17at 

12:55;  Start 2/7/17 at 17:00


Dextrose 12.5 gm PRN Q15MIN  PRN IV SEE COMMENTS;  Start 2/7/17 at 15:00


Info 1 each 1 each PRN DAILY  PRN MC SEE COMMENTS Last administered on 2/9/17at 

09:38;  Start 2/7/17 at 17:00;  Stop 2/10/17 at 08:25;  Status DC


Magnesium Sulfate/ Dextrose (Magnesium Sulfate PREMIX 2GM) 50 ml @ 25 mls/hr 

PRN DAILY  PRN IV for Mag < 1.7 on am labs;  Start 2/8/17 at 11:15


Lidocaine/Sodium Bicarbonate (Buffered Lidocaine 1%) 3 ml 1X  ONCE IJ  Last 

administered on 2/8/17at 14:03;  Start 2/8/17 at 12:30;  Stop 2/8/17 at 12:34;  

Status DC


Heparin Sodium (Porcine) 2,400 unit 1X  ONCE INT CAT  Last administered on 2/8/ 17at 14:04;  Start 2/8/17 at 12:30;  Stop 2/8/17 at 12:34;  Status DC


Heparin Sodium/ Sodium Chloride 60 unit 1X  ONCE IV  Last administered on 2/8/ 17at 14:04;  Start 2/8/17 at 12:30;  Stop 2/8/17 at 12:34;  Status DC


Lisinopril (Prinivil) 20 mg DAILY PO  Last administered on 2/12/17at 09:22;  

Start 2/8/17 at 16:00;  Stop 2/13/17 at 10:31;  Status DC


Hydralazine HCl 50 mg 50 mg BID PO  Last administered on 2/12/17at 20:55;  

Start 2/8/17 at 21:00;  Stop 2/13/17 at 10:31;  Status DC


Sodium Chloride (Iv Sodium Chloride 0.9% 1000ml Bag) 1,000 ml @  1,000 mls/hr 

Q1H PRN IV hypotension;  Start 2/8/17 at 18:28;  Stop 2/9/17 at 00:27;  Status 

DC


Diphenhydramine HCl (Benadryl) 25 mg 1X PRN  PRN IV ITCHING;  Start 2/8/17 at 18

:30;  Stop 2/9/17 at 18:29;  Status DC


Diphenhydramine HCl (Benadryl) 25 mg 1X PRN  PRN IV ITCHING;  Start 2/8/17 at 18

:30;  Stop 2/9/17 at 18:29;  Status DC


Sodium Chloride (Normal Saline Flush) 10 ml 1X PRN  PRN IV AP catheter pack;  

Start 2/8/17 at 18:30;  Stop 2/9/17 at 18:29;  Status DC


Sodium Chloride 10 ml 10 ml 1X PRN  PRN IV  catheter pack;  Start 2/8/17 at 18

:30;  Stop 2/9/17 at 18:29;  Status DC


Sodium Chloride (Iv Sodium Chloride 0.9% 1000ml Bag) 1,000 ml @  400 mls/hr 

Q2H30M PRN IV PATENCY;  Start 2/8/17 at 18:28;  Stop 2/9/17 at 06:27;  Status DC


Info (PHARMACY MONITORING -- do not chart) 1 each PRN DAILY  PRN MC SEE COMMENTS

;  Start 2/8/17 at 18:30


Info (PHARMACY MONITORING -- do not chart) 1 each PRN DAILY  PRN MC SEE COMMENTS

;  Start 2/9/17 at 10:30;  Status UNV


Info (PHARMACY MONITORING -- do not chart) 1 each PRN DAILY  PRN MC SEE COMMENTS

;  Start 2/9/17 at 10:30;  Status Cancel


Darbepoetin Valeriano (Aranesp) 60 mcg WEEKLYHS SQ  Last administered on 2/9/17at 20:

48;  Start 2/9/17 at 21:00


Clopidogrel Bisulfate (Plavix) 75 mg DAILYWBKFT PO ;  Start 2/10/17 at 08:00;  

Stop 2/10/17 at 08:00;  Status DC


Clopidogrel Bisulfate 75 mg 75 mg DAILYWBKFT PO  Last administered on 2/10/17at 

08:25;  Start 2/9/17 at 15:30;  Stop 2/11/17 at 18:54;  Status DC


Sodium Chloride (Iv Sodium Chloride 0.9% 1000ml Bag) 1,000 ml @  1,000 mls/hr 

Q1H PRN IV hypotension;  Start 2/10/17 at 08:11;  Stop 2/10/17 at 15:00;  

Status DC


Diphenhydramine HCl (Benadryl) 25 mg 1X PRN  PRN IV ITCHING;  Start 2/10/17 at 

08:15;  Stop 2/10/17 at 15:00;  Status DC


Diphenhydramine HCl (Benadryl) 25 mg 1X PRN  PRN IV ITCHING;  Start 2/10/17 at 

08:15;  Stop 2/10/17 at 15:00;  Status DC


Sodium Chloride (Normal Saline Flush) 10 ml 1X PRN  PRN IV AP catheter pack;  

Start 2/10/17 at 08:15;  Stop 2/10/17 at 15:00;  Status DC


Sodium Chloride 10 ml 10 ml 1X PRN  PRN IV  catheter pack;  Start 2/10/17 at 

08:15;  Stop 2/10/17 at 15:00;  Status DC


Sodium Chloride (Iv Sodium Chloride 0.9% 1000ml Bag) 1,000 ml @  400 mls/hr 

Q2H30M PRN IV PATENCY;  Start 2/10/17 at 08:11;  Stop 2/10/17 at 21:00;  Status 

DC


Info 1 each 1 each PRN DAILY  PRN MC SEE COMMENTS;  Start 2/10/17 at 08:15;  

Status UNV


Magnesium Sulfate/ Dextrose 100 ml @  100 mls/hr 1X  ONCE IV  Last administered 

on 2/10/17at 12:36;  Start 2/10/17 at 10:30;  Stop 2/10/17 at 11:29;  Status DC


Ceftriaxone Sodium 1 gm/ Sodium Chloride 50 ml @  100 mls/hr Q24H IV  Last 

administered on 2/15/17at 17:35;  Start 2/12/17 at 16:00


Sodium Chloride 1,000 ml @  1,000 mls/hr Q1H PRN IV hypotension;  Start 2/13/17 

at 08:48;  Stop 2/13/17 at 14:47;  Status DC


Sodium Chloride (Iv Sodium Chloride 0.9% 1000ml Bag) 1,000 ml @  400 mls/hr 

Q2H30M PRN IV PATENCY;  Start 2/13/17 at 08:48;  Stop 2/13/17 at 20:47;  Status 

DC


Info (PHARMACY MONITORING -- do not chart) 1 each PRN DAILY  PRN MC SEE COMMENTS

;  Start 2/13/17 at 09:00;  Status UNV


Lisinopril (Prinivil) 40 mg DAILY PO  Last administered on 2/16/17at 11:08;  

Start 2/14/17 at 09:00


Cyanocobalamin (Vitamin B-12) 1,000 mcg DAILY PO  Last administered on 2/16/ 17at 11:03;  Start 2/13/17 at 15:30


Ferrous Sulfate (Feosol) 325 mg BID PO  Last administered on 2/16/17at 11:03;  

Start 2/13/17 at 21:00


Heparin Sodium (Porcine) 10,000 unit STK-MED ONCE .ROUTE ;  Start 2/14/17 at 17:

14;  Stop 2/14/17 at 17:15;  Status DC


Lidocaine/ Epinephrine 20 ml 20 ml STK-MED ONCE .ROUTE ;  Start 2/14/17 at 17:14

;  Stop 2/14/17 at 17:15;  Status DC


Heparin Sodium/ Sodium Chloride 500 ml @  As Directed STK-MED ONCE .ROUTE ;  

Start 2/14/17 at 17:14;  Stop 2/14/17 at 17:15;  Status DC


Fentanyl Citrate (Fentanyl 2ml Vial) 100 mcg STK-MED ONCE .ROUTE ;  Start 2/14/ 17 at 17:35;  Stop 2/14/17 at 17:36;  Status DC


Midazolam HCl 2 mg 2 mg STK-MED ONCE .ROUTE ;  Start 2/14/17 at 17:35;  Stop 2/ 14/17 at 17:36;  Status DC


Cefazolin Sodium (Ancef 1gm Ivpb For Omni) 50 ml @ As Directed STK-MED ONCE IV 

;  Start 2/14/17 at 17:36;  Stop 2/14/17 at 17:37;  Status DC


Heparin Sodium/ Sodium Chloride 1,000 unit 1X  ONCE IART  Last administered on 2 /14/17at 18:04;  Start 2/14/17 at 18:00;  Stop 2/14/17 at 18:02;  Status DC


Midazolam HCl (Versed) 2 mg 1X  ONCE IV  Last administered on 2/14/17at 18:04;  

Start 2/14/17 at 18:00;  Stop 2/14/17 at 18:02;  Status DC


Fentanyl Citrate (Fentanyl 2ml Vial) 100 mcg 1X  ONCE IV  Last administered on 2 /14/17at 18:04;  Start 2/14/17 at 18:00;  Stop 2/14/17 at 18:02;  Status DC


Heparin Sodium (Porcine) 4300 unit 4,300 unit 1X  ONCE IV  Last administered on 

2/14/17at 18:05;  Start 2/14/17 at 18:00;  Stop 2/14/17 at 18:02;  Status DC


Cefazolin Sodium (Ancef 1gm Ivpb For Omni) 50 ml @  100 mls/hr 1X  ONCE IV  

Last administered on 2/14/17at 18:06;  Start 2/14/17 at 18:00;  Stop 2/14/17 at 

18:29;  Status DC


Lidocaine/ Epinephrine 10 ml 10 ml 1X  ONCE IJ  Last administered on 2/14/17at 

18:05;  Start 2/14/17 at 18:00;  Stop 2/14/17 at 18:02;  Status DC


Sodium Chloride 1,000 ml @  1,000 mls/hr Q1H PRN IV hypotension;  Start 2/15/17 

at 14:32;  Stop 2/15/17 at 20:31;  Status DC


Albumin Human (Albuminar) 200 ml @  200 mls/hr 1X PRN  PRN IV Hypotension;  

Start 2/15/17 at 14:45;  Stop 2/15/17 at 20:44;  Status DC


Sodium Chloride (Normal Saline Flush) 10 ml 1X PRN  PRN IV AP catheter pack;  

Start 2/15/17 at 14:45;  Stop 2/16/17 at 14:44;  Status DC


Sodium Chloride 10 ml 10 ml 1X PRN  PRN IV  catheter pack;  Start 2/15/17 at 

14:45;  Stop 2/16/17 at 14:44;  Status DC


Sodium Chloride (Iv Sodium Chloride 0.9% 1000ml Bag) 1,000 ml @  400 mls/hr 

Q2H30M PRN IV PATENCY;  Start 2/15/17 at 14:32;  Stop 2/16/17 at 02:31;  Status 

DC





Active Scripts


Active


Lisinopril 20 Mg Tablet 20 Mg PO QHS


Isosorbide Mononitrate Er (Isosorbide Mononitrate) 30 Mg Tab.er.24h 30 Mg PO 

DAILY


Hydralazine Hcl 50 Mg Tablet 100 Mg PO TID


Carvedilol 12.5 Mg Tablet 25 Mg PO BIDWMEALS


Atorvastatin Calcium 40 Mg Tablet 80 Mg PO QHS


Aspirin Ec (Aspirin) 81 Mg Tablet.dr 81 Mg PO DAILYWBKFT


Amlodipine Besylate 5 Mg Tablet 5 Mg PO DAILYWLUN


Reported


Clopidogrel (Clopidogrel Bisulfate) 75 Mg Tablet 75 Mg PO DAILY


Glyburide 5 Mg Tablet 1 Tab PO BIDWMEALS


Lisinopril 20 Mg Tablet 1 Tab PO DAILY


Vitals/I & O





 Vital Sign - Last 24 Hours








 2/15/17 2/15/17 2/15/17 2/15/17





 17:36 19:22 21:18 22:27


 


Temp  99.4  





  99.4  


 


Pulse 93 79  


 


Resp  20 20 20


 


B/P 156/97 146/65  


 


Pulse Ox  97  


 


O2 Delivery  Room Air Room Air 


 


    





    





 2/15/17 2/16/17 2/16/17 2/16/17





 23:28 02:51 07:00 07:33


 


Temp 99.3 98.3 98.6 





 99.3 98.3 98.6 


 


Pulse 77 72 70 


 


Resp 18 20 18 


 


B/P 127/48 134/58 155/59 


 


Pulse Ox 93 95 94 


 


O2 Delivery Room Air Room Air Room Air Room Air


 


    





    





 2/16/17 2/16/17 2/16/17 2/16/17





 11:00 11:03 11:04 11:08


 


Temp 98.6   





 98.6   


 


Pulse 72 70 70 70


 


Resp 18   


 


B/P 149/50 149/50 149/50 149/50


 


Pulse Ox 94   


 


O2 Delivery Room Air   


 


    





    





 2/16/17   





 14:51   


 


Temp 98.8   





 98.8   


 


Pulse 67   


 


Resp 18   


 


B/P 134/47   


 


Pulse Ox 96   


 


O2 Delivery Nasal Cannula   














 Intake and Output   


 


 2/15/17 2/15/17 2/16/17





 15:00 23:00 07:00


 


Intake Total  120 ml 120 ml


 


Output Total   100 ml


 


Balance  120 ml 20 ml














ARTEMIO NUNES MD Feb 16, 2017 15:03

## 2017-02-17 NOTE — PDOC
PROGRESS NOTES


Chief Complaint


Chief Complaint


cc: sob 





A//P


1.  Acute on chronic systolic congestive heart failure. improved 


2.  Non-ST-segment elevation myocardial infarction.


3.  WALTER with CKD


4.  Ischemic cardiomyopathy.


5.  Malignant hypertension, Better


6.  Anemia


7.  Hyperlipidemia.


8.  Psoriasis.


9. CAD


10. Anemia


11. UTI not POA


12. DM2





Plan 


IV Rocephin, fu ucx


HD per  nephrology 


SW to arrange out pt HD


BP controlled, 


low hemoglobin but pt declined to have blood products due to Hoah[employer]ism reasons. 

risks and benefits explained. 


check vitb12, fa, add iron po


SSI 


Lipitor 


labs reviewed 


Monitor hemoglobin 


Prognosis guarded. 


talked to daughter at bedside


CHECK hba1c, add glimepiride 1mg daily


talked to CARD, NO intervention now given low HB, PT WILL get HD perm cath today

, dc in 1-2ds when outpt HD set up





History of Present Illness


History of Present Illness


sob better


no fever


no chills. 


no chest pain 





still dysuria, much better. no frequent urination





Vitals


Vitals





 Vital Signs








  Date Time  Temp Pulse Resp B/P Pulse Ox O2 Delivery O2 Flow Rate FiO2


 


2/17/17 09:46  70  146/46    


 


2/17/17 09:30 98.4  18  92 Room Air  





 98.4       


 


2/16/17 20:00       2.0 











Physical Exam


General:  Alert, Oriented X3


Heart:  Regular rate, Normal S1, Normal S2, No murmurs, Gallops


Lungs:  Clear, Other


Abdomen:  Normal bowel sounds, Soft, No tenderness, No hepatosplenomegaly, No 

masses


Extremities:  No clubbing, No cyanosis, No edema, Normal pulses, No tenderness/

swelling


Skin:  No breakdown, No significant lesion, Other (psoriasis )





Labs


LABS





Laboratory Tests








Test


  2/16/17


17:13 2/16/17


20:44 2/17/17


03:32 2/17/17


08:30


 


Glucose (Fingerstick)


  286mg/dL


(70-99) 276mg/dL


(70-99) 


  182mg/dL


(70-99)


 


Sodium Level


  


  


  140mmol/L


(136-145) 


 


 


Potassium Level


  


  


  3.8mmol/L


(3.5-5.1) 


 


 


Chloride Level


  


  


  100mmol/L


() 


 


 


Carbon Dioxide Level


  


  


  33mmol/L


(21-32) 


 


 


Anion Gap   7 (6-14)  


 


Blood Urea Nitrogen   40mg/dL (7-20)  


 


Creatinine


  


  


  4.1mg/dL


(0.6-1.0) 


 


 


Estimated GFR


(Cockcroft-Gault) 


  


  11.1 


  


 


 


Glucose Level


  


  


  158mg/dL


(70-99) 


 


 


Calcium Level


  


  


  7.9mg/dL


(8.5-10.1) 


 











Review of Systems


Review of Systems


no fever, chills, sob or chest pain





Assessment and Plan


Assessmemt and Plan


 Problems


Medical Problems:


(1) Congestive heart failure


Status: Acute  





(2) NSTEMI (non-ST elevated myocardial infarction)


Status: Acute  








Problems:  





Comment


Review of Relevant


I have reviewed the following items lore (where applicable) has been applied.


Labs





Laboratory Tests








Test


  2/15/17


16:56 2/15/17


20:49 2/16/17


08:30 2/16/17


11:02


 


Glucose (Fingerstick)


  345mg/dL


(70-99) 250mg/dL


(70-99) 225mg/dL


(70-99) 315mg/dL


(70-99)














Test


  2/16/17


17:13 2/16/17


20:44 2/17/17


03:32 2/17/17


08:30


 


Glucose (Fingerstick)


  286mg/dL


(70-99) 276mg/dL


(70-99) 


  182mg/dL


(70-99)


 


Sodium Level


  


  


  140mmol/L


(136-145) 


 


 


Potassium Level


  


  


  3.8mmol/L


(3.5-5.1) 


 


 


Chloride Level


  


  


  100mmol/L


() 


 


 


Carbon Dioxide Level


  


  


  33mmol/L


(21-32) 


 


 


Anion Gap   7 (6-14)  


 


Blood Urea Nitrogen   40mg/dL (7-20)  


 


Creatinine


  


  


  4.1mg/dL


(0.6-1.0) 


 


 


Estimated GFR


(Cockcroft-Gault) 


  


  11.1 


  


 


 


Glucose Level


  


  


  158mg/dL


(70-99) 


 


 


Calcium Level


  


  


  7.9mg/dL


(8.5-10.1) 


 








Laboratory Tests








Test


  2/16/17


17:13 2/16/17


20:44 2/17/17


03:32 2/17/17


08:30


 


Glucose (Fingerstick)


  286mg/dL


(70-99) 276mg/dL


(70-99) 


  182mg/dL


(70-99)


 


Sodium Level


  


  


  140mmol/L


(136-145) 


 


 


Potassium Level


  


  


  3.8mmol/L


(3.5-5.1) 


 


 


Chloride Level


  


  


  100mmol/L


() 


 


 


Carbon Dioxide Level


  


  


  33mmol/L


(21-32) 


 


 


Anion Gap   7 (6-14)  


 


Blood Urea Nitrogen   40mg/dL (7-20)  


 


Creatinine


  


  


  4.1mg/dL


(0.6-1.0) 


 


 


Estimated GFR


(Cockcroft-Gault) 


  


  11.1 


  


 


 


Glucose Level


  


  


  158mg/dL


(70-99) 


 


 


Calcium Level


  


  


  7.9mg/dL


(8.5-10.1) 


 








Microbiology


2/10/17 Blood Culture - Final, Complete


          NO GROWTH AFTER 5 DAYS


2/12/17 Urine Culture - Final, Complete


          


2/12/17 Urine Culture Result 1 (SEAN) - Final, Complete


Medications





 Current Medications


Aspirin 324 mg 324 mg 1X  ONCE PO  Last administered on 2/7/17at 12:59;  Start 2 /7/17 at 12:45;  Stop 2/7/17 at 12:48;  Status DC


Heparin Sodium/ Dextrose 500 ml @  19 mls/hr CONT  PRN IV SEE I/O RECORD Last 

administered on 2/8/17at 14:45;  Start 2/7/17 at 12:45;  Stop 2/9/17 at 15:30;  

Status DC


Heparin Sodium (Porcine) 1,950 unit PRN Q6HRS  PRN IV FOR UFH LEVEL LESS THAN 

0.2 Last administered on 2/7/17at 13:01;  Start 2/7/17 at 12:45;  Stop 2/9/17 

at 15:30;  Status DC


Ondansetron HCl (Zofran) 4 mg PRN Q8HRS  PRN IV NAUSEA/VOMITING;  Start 2/7/17 

at 12:45;  Stop 2/8/17 at 12:44;  Status DC


Morphine Sulfate 4 mg PRN Q2HR  PRN IV PAIN Last administered on 2/7/17at 14:03

;  Start 2/7/17 at 12:45;  Stop 2/8/17 at 12:44;  Status DC


Nitroglycerin (Nitrostat) 0.4 mg PRN Q5MIN  PRN SL CHEST PAIN Last administered 

on 2/7/17at 14:03;  Start 2/7/17 at 12:45;  Stop 2/8/17 at 12:44;  Status DC


Amlodipine Besylate (Norvasc) 5 mg DAILYWLUN PO  Last administered on 2/8/17at 

08:32;  Start 2/7/17 at 14:00;  Stop 2/8/17 at 16:02;  Status DC


Aspirin (Ecotrin) 81 mg DAILYWBKFT PO  Last administered on 2/16/17at 11:03;  

Start 2/7/17 at 14:00


Atorvastatin Calcium (Lipitor) 80 mg QHS PO  Last administered on 2/16/17at 20:

52;  Start 2/7/17 at 21:00


Carvedilol (Coreg) 25 mg BIDWMEALS PO  Last administered on 2/17/17at 09:46;  

Start 2/7/17 at 17:00


Clopidogrel Bisulfate (Plavix) 75 mg DAILY PO  Last administered on 2/7/17at 14:

28;  Start 2/7/17 at 14:00;  Stop 2/8/17 at 08:41;  Status DC


Isosorbide Mononitrate (Imdur) 30 mg DAILY PO  Last administered on 2/16/17at 11

:03;  Start 2/7/17 at 14:00


Furosemide (Lasix) 40 mg 1X  ONCE IVP  Last administered on 2/7/17at 14:30;  

Start 2/7/17 at 14:00;  Stop 2/7/17 at 14:23;  Status DC


Furosemide 40 mg 40 mg 1X  ONCE IVP  Last administered on 2/7/17at 14:30;  

Start 2/7/17 at 14:30;  Stop 2/7/17 at 14:38;  Status DC


Nitroglycerin/ Dextrose (Nitroglycerin Drip) 250 ml @ 0 mls/hr CONT  PRN IV SEE 

I/O RECORD Last administered on 2/7/17at 15:23;  Start 2/7/17 at 14:30;  Stop 2/ 8/17 at 16:02;  Status DC


Acetaminophen (Tylenol) 325 mg PRN Q6HRS  PRN PO MILD PAIN / TEMP Last 

administered on 2/13/17at 21:59;  Start 2/7/17 at 15:00


Acetaminophen/ Hydrocodone Bitart (Lortab 5/325) 1 tab PRN Q6HRS  PRN PO 

MODERATE TO SEVERE PAIN Last administered on 2/15/17at 21:18;  Start 2/7/17 at 

15:00


Hydralazine HCl (Apresoline) 10 mg PRN Q4HRS  PRN IVP ELEVATED BP, SEE COMMENTS

;  Start 2/7/17 at 15:00


Ondansetron HCl (Zofran) 4 mg PRN Q8HRS  PRN IV NAUSEA/VOMITING;  Start 2/7/17 

at 15:00


Albuterol Sulfate (Ventolin Neb Soln) 2.5 mg PRN Q4HRS  PRN NEB SHORTNESS OF 

BREATH;  Start 2/7/17 at 15:00


Insulin Aspart (Novolog) 0-7 UNITS TIDWMEALS SQ  Last administered on 2/17/17at 

10:06;  Start 2/7/17 at 17:00


Dextrose 12.5 gm PRN Q15MIN  PRN IV SEE COMMENTS;  Start 2/7/17 at 15:00


Info 1 each 1 each PRN DAILY  PRN MC SEE COMMENTS Last administered on 2/9/17at 

09:38;  Start 2/7/17 at 17:00;  Stop 2/10/17 at 08:25;  Status DC


Magnesium Sulfate/ Dextrose (Magnesium Sulfate PREMIX 2GM) 50 ml @ 25 mls/hr 

PRN DAILY  PRN IV for Mag < 1.7 on am labs;  Start 2/8/17 at 11:15


Lidocaine/Sodium Bicarbonate (Buffered Lidocaine 1%) 3 ml 1X  ONCE IJ  Last 

administered on 2/8/17at 14:03;  Start 2/8/17 at 12:30;  Stop 2/8/17 at 12:34;  

Status DC


Heparin Sodium (Porcine) 2,400 unit 1X  ONCE INT CAT  Last administered on 2/8/ 17at 14:04;  Start 2/8/17 at 12:30;  Stop 2/8/17 at 12:34;  Status DC


Heparin Sodium/ Sodium Chloride 60 unit 1X  ONCE IV  Last administered on 2/8/ 17at 14:04;  Start 2/8/17 at 12:30;  Stop 2/8/17 at 12:34;  Status DC


Lisinopril (Prinivil) 20 mg DAILY PO  Last administered on 2/12/17at 09:22;  

Start 2/8/17 at 16:00;  Stop 2/13/17 at 10:31;  Status DC


Hydralazine HCl 50 mg 50 mg BID PO  Last administered on 2/12/17at 20:55;  

Start 2/8/17 at 21:00;  Stop 2/13/17 at 10:31;  Status DC


Sodium Chloride (Iv Sodium Chloride 0.9% 1000ml Bag) 1,000 ml @  1,000 mls/hr 

Q1H PRN IV hypotension;  Start 2/8/17 at 18:28;  Stop 2/9/17 at 00:27;  Status 

DC


Diphenhydramine HCl (Benadryl) 25 mg 1X PRN  PRN IV ITCHING;  Start 2/8/17 at 18

:30;  Stop 2/9/17 at 18:29;  Status DC


Diphenhydramine HCl (Benadryl) 25 mg 1X PRN  PRN IV ITCHING;  Start 2/8/17 at 18

:30;  Stop 2/9/17 at 18:29;  Status DC


Sodium Chloride (Normal Saline Flush) 10 ml 1X PRN  PRN IV AP catheter pack;  

Start 2/8/17 at 18:30;  Stop 2/9/17 at 18:29;  Status DC


Sodium Chloride 10 ml 10 ml 1X PRN  PRN IV  catheter pack;  Start 2/8/17 at 18

:30;  Stop 2/9/17 at 18:29;  Status DC


Sodium Chloride (Iv Sodium Chloride 0.9% 1000ml Bag) 1,000 ml @  400 mls/hr 

Q2H30M PRN IV PATENCY;  Start 2/8/17 at 18:28;  Stop 2/9/17 at 06:27;  Status DC


Info (PHARMACY MONITORING -- do not chart) 1 each PRN DAILY  PRN MC SEE COMMENTS

;  Start 2/8/17 at 18:30


Info (PHARMACY MONITORING -- do not chart) 1 each PRN DAILY  PRN MC SEE COMMENTS

;  Start 2/9/17 at 10:30;  Status UNV


Info (PHARMACY MONITORING -- do not chart) 1 each PRN DAILY  PRN MC SEE COMMENTS

;  Start 2/9/17 at 10:30;  Status Cancel


Darbepoetin Valeriano (Aranesp) 60 mcg WEEKLYHS SQ  Last administered on 2/16/17at 20

:53;  Start 2/9/17 at 21:00


Clopidogrel Bisulfate (Plavix) 75 mg DAILYWBKFT PO ;  Start 2/10/17 at 08:00;  

Stop 2/10/17 at 08:00;  Status DC


Clopidogrel Bisulfate 75 mg 75 mg DAILYWBKFT PO  Last administered on 2/10/17at 

08:25;  Start 2/9/17 at 15:30;  Stop 2/11/17 at 18:54;  Status DC


Sodium Chloride (Iv Sodium Chloride 0.9% 1000ml Bag) 1,000 ml @  1,000 mls/hr 

Q1H PRN IV hypotension;  Start 2/10/17 at 08:11;  Stop 2/10/17 at 15:00;  

Status DC


Diphenhydramine HCl (Benadryl) 25 mg 1X PRN  PRN IV ITCHING;  Start 2/10/17 at 

08:15;  Stop 2/10/17 at 15:00;  Status DC


Diphenhydramine HCl (Benadryl) 25 mg 1X PRN  PRN IV ITCHING;  Start 2/10/17 at 

08:15;  Stop 2/10/17 at 15:00;  Status DC


Sodium Chloride (Normal Saline Flush) 10 ml 1X PRN  PRN IV AP catheter pack;  

Start 2/10/17 at 08:15;  Stop 2/10/17 at 15:00;  Status DC


Sodium Chloride 10 ml 10 ml 1X PRN  PRN IV  catheter pack;  Start 2/10/17 at 

08:15;  Stop 2/10/17 at 15:00;  Status DC


Sodium Chloride (Iv Sodium Chloride 0.9% 1000ml Bag) 1,000 ml @  400 mls/hr 

Q2H30M PRN IV PATENCY;  Start 2/10/17 at 08:11;  Stop 2/10/17 at 21:00;  Status 

DC


Info 1 each 1 each PRN DAILY  PRN MC SEE COMMENTS;  Start 2/10/17 at 08:15;  

Status UNV


Magnesium Sulfate/ Dextrose 100 ml @  100 mls/hr 1X  ONCE IV  Last administered 

on 2/10/17at 12:36;  Start 2/10/17 at 10:30;  Stop 2/10/17 at 11:29;  Status DC


Ceftriaxone Sodium 1 gm/ Sodium Chloride 50 ml @  100 mls/hr Q24H IV  Last 

administered on 2/15/17at 17:35;  Start 2/12/17 at 16:00;  Stop 2/16/17 at 15:02

;  Status DC


Sodium Chloride 1,000 ml @  1,000 mls/hr Q1H PRN IV hypotension;  Start 2/13/17 

at 08:48;  Stop 2/13/17 at 14:47;  Status DC


Sodium Chloride (Iv Sodium Chloride 0.9% 1000ml Bag) 1,000 ml @  400 mls/hr 

Q2H30M PRN IV PATENCY;  Start 2/13/17 at 08:48;  Stop 2/13/17 at 20:47;  Status 

DC


Info (PHARMACY MONITORING -- do not chart) 1 each PRN DAILY  PRN MC SEE COMMENTS

;  Start 2/13/17 at 09:00;  Status UNV


Lisinopril (Prinivil) 40 mg DAILY PO  Last administered on 2/16/17at 11:08;  

Start 2/14/17 at 09:00


Cyanocobalamin (Vitamin B-12) 1,000 mcg DAILY PO  Last administered on 2/16/ 17at 11:03;  Start 2/13/17 at 15:30


Ferrous Sulfate (Feosol) 325 mg BID PO  Last administered on 2/16/17at 20:52;  

Start 2/13/17 at 21:00


Heparin Sodium (Porcine) 10,000 unit STK-MED ONCE .ROUTE ;  Start 2/14/17 at 17:

14;  Stop 2/14/17 at 17:15;  Status DC


Lidocaine/ Epinephrine 20 ml 20 ml STK-MED ONCE .ROUTE ;  Start 2/14/17 at 17:14

;  Stop 2/14/17 at 17:15;  Status DC


Heparin Sodium/ Sodium Chloride 500 ml @  As Directed STK-MED ONCE .ROUTE ;  

Start 2/14/17 at 17:14;  Stop 2/14/17 at 17:15;  Status DC


Fentanyl Citrate (Fentanyl 2ml Vial) 100 mcg STK-MED ONCE .ROUTE ;  Start 2/14/ 17 at 17:35;  Stop 2/14/17 at 17:36;  Status DC


Midazolam HCl 2 mg 2 mg STK-MED ONCE .ROUTE ;  Start 2/14/17 at 17:35;  Stop 2/ 14/17 at 17:36;  Status DC


Cefazolin Sodium (Ancef 1gm Ivpb For Omni) 50 ml @ As Directed STK-MED ONCE IV 

;  Start 2/14/17 at 17:36;  Stop 2/14/17 at 17:37;  Status DC


Heparin Sodium/ Sodium Chloride 1,000 unit 1X  ONCE IART  Last administered on 2 /14/17at 18:04;  Start 2/14/17 at 18:00;  Stop 2/14/17 at 18:02;  Status DC


Midazolam HCl (Versed) 2 mg 1X  ONCE IV  Last administered on 2/14/17at 18:04;  

Start 2/14/17 at 18:00;  Stop 2/14/17 at 18:02;  Status DC


Fentanyl Citrate (Fentanyl 2ml Vial) 100 mcg 1X  ONCE IV  Last administered on 2 /14/17at 18:04;  Start 2/14/17 at 18:00;  Stop 2/14/17 at 18:02;  Status DC


Heparin Sodium (Porcine) 4300 unit 4,300 unit 1X  ONCE IV  Last administered on 

2/14/17at 18:05;  Start 2/14/17 at 18:00;  Stop 2/14/17 at 18:02;  Status DC


Cefazolin Sodium (Ancef 1gm Ivpb For Omni) 50 ml @  100 mls/hr 1X  ONCE IV  

Last administered on 2/14/17at 18:06;  Start 2/14/17 at 18:00;  Stop 2/14/17 at 

18:29;  Status DC


Lidocaine/ Epinephrine 10 ml 10 ml 1X  ONCE IJ  Last administered on 2/14/17at 

18:05;  Start 2/14/17 at 18:00;  Stop 2/14/17 at 18:02;  Status DC


Sodium Chloride 1,000 ml @  1,000 mls/hr Q1H PRN IV hypotension;  Start 2/15/17 

at 14:32;  Stop 2/15/17 at 20:31;  Status DC


Albumin Human (Albuminar) 200 ml @  200 mls/hr 1X PRN  PRN IV Hypotension;  

Start 2/15/17 at 14:45;  Stop 2/15/17 at 20:44;  Status DC


Sodium Chloride (Normal Saline Flush) 10 ml 1X PRN  PRN IV AP catheter pack;  

Start 2/15/17 at 14:45;  Stop 2/16/17 at 14:44;  Status DC


Sodium Chloride 10 ml 10 ml 1X PRN  PRN IV  catheter pack;  Start 2/15/17 at 

14:45;  Stop 2/16/17 at 14:44;  Status DC


Sodium Chloride (Iv Sodium Chloride 0.9% 1000ml Bag) 1,000 ml @  400 mls/hr 

Q2H30M PRN IV PATENCY;  Start 2/15/17 at 14:32;  Stop 2/16/17 at 02:31;  Status 

DC


Glimepiride 1 mg 1 mg DAILY PO  Last administered on 2/17/17at 09:46;  Start 2/ 16/17 at 17:00


Sodium Chloride (Iv Sodium Chloride 0.9% 1000ml Bag) 1,000 ml @  1,000 mls/hr 

Q1H PRN IV hypotension;  Start 2/17/17 at 09:18;  Stop 2/17/17 at 15:17


Acetaminophen (Tylenol) 500 mg 1X PRN  PRN PO MILD PAIN / TEMP;  Start 2/17/17 

at 09:30;  Stop 2/18/17 at 09:29


Diphenhydramine HCl (Benadryl) 25 mg 1X PRN  PRN IV ITCHING;  Start 2/17/17 at 

09:30;  Stop 2/18/17 at 09:29


Diphenhydramine HCl (Benadryl) 25 mg 1X PRN  PRN IV ITCHING;  Start 2/17/17 at 

09:30;  Stop 2/18/17 at 09:29


Sodium Chloride (Normal Saline Flush) 10 ml 1X PRN  PRN IV AP catheter pack;  

Start 2/17/17 at 09:30;  Stop 2/18/17 at 09:29


Sodium Chloride (Normal Saline Flush) 10 ml 1X PRN  PRN IV  catheter pack;  

Start 2/17/17 at 09:30;  Stop 2/18/17 at 09:29


Labetalol HCl 10 mg 10 mg PRN Q1HR  PRN IVP SBP > 180;  Start 2/17/17 at 09:30;

  Stop 2/18/17 at 09:29


Sodium Chloride (Iv Sodium Chloride 0.9% 1000ml Bag) 1,000 ml @  400 mls/hr 

Q2H30M PRN IV PATENCY;  Start 2/17/17 at 09:18;  Stop 2/17/17 at 21:17


Info (PHARMACY MONITORING -- do not chart) 1 each PRN DAILY  PRN MC SEE COMMENTS

;  Start 2/17/17 at 09:30;  Status UNV





Active Scripts


Active


Lisinopril 20 Mg Tablet 20 Mg PO QHS


Isosorbide Mononitrate Er (Isosorbide Mononitrate) 30 Mg Tab.er.24h 30 Mg PO 

DAILY


Hydralazine Hcl 50 Mg Tablet 100 Mg PO TID


Carvedilol 12.5 Mg Tablet 25 Mg PO BIDWMEALS


Atorvastatin Calcium 40 Mg Tablet 80 Mg PO QHS


Aspirin Ec (Aspirin) 81 Mg Tablet.dr 81 Mg PO DAILYWBKFT


Amlodipine Besylate 5 Mg Tablet 5 Mg PO DAILYWLUN


Reported


Clopidogrel (Clopidogrel Bisulfate) 75 Mg Tablet 75 Mg PO DAILY


Glyburide 5 Mg Tablet 1 Tab PO BIDWMEALS


Lisinopril 20 Mg Tablet 1 Tab PO DAILY


Vitals/I & O





 Vital Sign - Last 24 Hours








 2/16/17 2/16/17 2/16/17 2/16/17





 14:51 18:04 19:35 20:00


 


Temp 98.8  99.2 





 98.8  99.2 


 


Pulse 67 72 72 


 


Resp 18  18 


 


B/P 134/47 134/72 130/61 


 


Pulse Ox 96  96 


 


O2 Delivery Nasal Cannula  Room Air Room Air


 


O2 Flow Rate    2.0


 


    





    





 2/16/17 2/17/17 2/17/17 2/17/17





 23:05 03:10 07:30 09:30


 


Temp 98.7 98.1 98.4 98.4





 98.7 98.1 98.4 98.4


 


Pulse 71 70 71 83


 


Resp 16 14 18


 


B/P 157/44 146/46 174/65 174/65


 


Pulse Ox 94 96 92 92


 


O2 Delivery Room Air Room Air Room Air Room Air


 


    





    





 2/17/17   





 09:46   


 


Pulse 70   


 


B/P 146/46   














 Intake and Output   


 


 2/16/17 2/16/17 2/17/17





 15:00 23:00 07:00


 


Intake Total  240 ml 50 ml


 


Output Total   500 ml


 


Balance  240 ml -450 ml














ARTEMIO NUNES MD Feb 17, 2017 13:54

## 2017-02-17 NOTE — PDOC
Renal-Progress Notes


Subjective Notes


Notes


NONE





History of Present Illness


Hx of present illness


STABLE





Vitals


Vitals





 Vital Signs








  Date Time  Temp Pulse Resp B/P Pulse Ox O2 Delivery O2 Flow Rate FiO2


 


2/17/17 09:46  70  146/46    


 


2/17/17 09:30 98.4  18  92 Room Air  





 98.4       


 


2/16/17 20:00       2.0 








Weight


Weight [ ]





I.O.


Intake and Output











 Intake and Output 


 


 2/17/17





 07:00


 


Intake Total 290 ml


 


Output Total 500 ml


 


Balance -210 ml


 


 


 


Intake Oral 240 ml


 


IV Total 50 ml


 


Output Urine Total 500 ml











Labs


Labs





Laboratory Tests








Test


  2/16/17


11:02 2/16/17


17:13 2/16/17


20:44 2/17/17


03:32


 


Glucose (Fingerstick)


  315mg/dL


(70-99) 286mg/dL


(70-99) 276mg/dL


(70-99) 


 


 


Sodium Level


  


  


  


  140mmol/L


(136-145)


 


Potassium Level


  


  


  


  3.8mmol/L


(3.5-5.1)


 


Chloride Level


  


  


  


  100mmol/L


()


 


Carbon Dioxide Level


  


  


  


  33mmol/L


(21-32)


 


Anion Gap    7 (6-14) 


 


Blood Urea Nitrogen    40mg/dL (7-20) 


 


Creatinine


  


  


  


  4.1mg/dL


(0.6-1.0)


 


Estimated GFR


(Cockcroft-Gault) 


  


  


  11.1 


 


 


Glucose Level


  


  


  


  158mg/dL


(70-99)


 


Calcium Level


  


  


  


  7.9mg/dL


(8.5-10.1)














Test


  2/17/17


08:30 


  


  


 


 


Glucose (Fingerstick)


  182mg/dL


(70-99) 


  


  


 











Micro


Micro





Microbiology


2/10/17 Blood Culture - Final, Complete


          NO GROWTH AFTER 5 DAYS


2/12/17 Urine Culture - Final, Complete


          


2/12/17 Urine Culture Result 1 (SEAN) - Final, Complete





Review of Systems


Constitutional:  yes: alert, oriented


Ears/Nose/Throat:  Yes: no symptom reported


Eyes:  Yes: no symptom reported


Pulmonary:  Yes no symptom reported


Cardiovascular:  Yes no symptom reported


Gastrointestional:  Yes: constipation


Genitourinary:  Yes: no symptom reported


Musculoskeletal:  Yes: muscle stiffness





Physical Exam


General Appearance:  no apparent distress


Skin:  warm


Respiratory:  bilateral CTA


Heart:  S1S2, RRR


Abdomen:  soft, bowel sounds present


Neurology:  alert, oriented





Assessment


Assessment


IMP





NEW ESRD


ANEMIA


S/P NEW TUNNELED HD CATHETER





PLAN





HD TODAY 


UF TO DW


WILL SET UP OP HD AT [facility] WHERE HER  DIALYZES


D/W WITH JANE LINARES MD Feb 17, 2017 10:41

## 2017-02-18 NOTE — PDOC
PROGRESS NOTES


Chief Complaint


Chief Complaint


cc: sob 





A//P


1.  Acute on chronic systolic congestive heart failure. improved 


2.  Non-ST-segment elevation myocardial infarction.


3.  WALTER with CKD


4.  Ischemic cardiomyopathy.


5.  Malignant hypertension, Better


6.  Anemia


7.  Hyperlipidemia.


8.  Psoriasis.


9. CAD


10. Anemia


11. UTI not POA


12. DM2





Plan 


dc abx


HD per  nephrology 


add amlodipine 


low hemoglobin but pt declined to have blood products due to Holiness reasons. 

risks and benefits explained. 


check vitb12, fa, add iron po


SSI 


Lipitor 


labs reviewed 


Monitor hemoglobin 


Prognosis guarded. 


talked to daughter at bedside


CHECK hba1c, increase glimepiride 2mg daily


talked to CARD, NO intervention now given low HB, PT WILL get HD perm cath today

, dc in 1-2ds when outpt HD set up





History of Present Illness


History of Present Illness


sob better


no fever


no chills. 


no chest pain 





still dysuria, much better. no frequent urination





Vitals


Vitals





 Vital Signs








  Date Time  Temp Pulse Resp B/P Pulse Ox O2 Delivery O2 Flow Rate FiO2


 


2/18/17 13:25    159/71    


 


2/18/17 10:07 98.8 69 20  94 Room Air  





 98.8       











Physical Exam


General:  Alert, Oriented X3


Heart:  Regular rate, Normal S1, Normal S2, No murmurs, Gallops


Lungs:  Clear, Other


Abdomen:  Normal bowel sounds, Soft, No tenderness, No hepatosplenomegaly, No 

masses


Extremities:  No clubbing, No cyanosis, No edema, Normal pulses, No tenderness/

swelling


Skin:  No breakdown, No significant lesion, Other (psoriasis )





Labs


LABS





Laboratory Tests








Test


  2/17/17


17:27 2/17/17


21:02 2/18/17


07:24 2/18/17


11:17


 


Glucose (Fingerstick)


  250mg/dL


(70-99) 294mg/dL


(70-99) 218mg/dL


(70-99) 323mg/dL


(70-99)











Review of Systems


Review of Systems


no fever, chills, sob or chest pain





Assessment and Plan


Assessmemt and Plan


 Problems


Medical Problems:


(1) Congestive heart failure


Status: Acute  





(2) NSTEMI (non-ST elevated myocardial infarction)


Status: Acute  








Problems:  





Comment


Review of Relevant


I have reviewed the following items lore (where applicable) has been applied.


Labs





Laboratory Tests








Test


  2/16/17


17:13 2/16/17


20:44 2/17/17


03:32 2/17/17


08:30


 


Glucose (Fingerstick)


  286mg/dL


(70-99) 276mg/dL


(70-99) 


  182mg/dL


(70-99)


 


Sodium Level


  


  


  140mmol/L


(136-145) 


 


 


Potassium Level


  


  


  3.8mmol/L


(3.5-5.1) 


 


 


Chloride Level


  


  


  100mmol/L


() 


 


 


Carbon Dioxide Level


  


  


  33mmol/L


(21-32) 


 


 


Anion Gap   7 (6-14)  


 


Blood Urea Nitrogen   40mg/dL (7-20)  


 


Creatinine


  


  


  4.1mg/dL


(0.6-1.0) 


 


 


Estimated GFR


(Cockcroft-Gault) 


  


  11.1 


  


 


 


Glucose Level


  


  


  158mg/dL


(70-99) 


 


 


Hemoglobin A1c   6.9% (4.8-5.6)  


 


Calcium Level


  


  


  7.9mg/dL


(8.5-10.1) 


 














Test


  2/17/17


17:27 2/17/17


21:02 2/18/17


07:24 2/18/17


11:17


 


Glucose (Fingerstick)


  250mg/dL


(70-99) 294mg/dL


(70-99) 218mg/dL


(70-99) 323mg/dL


(70-99)








Laboratory Tests








Test


  2/17/17


17:27 2/17/17


21:02 2/18/17


07:24 2/18/17


11:17


 


Glucose (Fingerstick)


  250mg/dL


(70-99) 294mg/dL


(70-99) 218mg/dL


(70-99) 323mg/dL


(70-99)








Microbiology


2/10/17 Blood Culture - Final, Complete


          NO GROWTH AFTER 5 DAYS


2/12/17 Urine Culture - Final, Complete


          


2/12/17 Urine Culture Result 1 (SEAN) - Final, Complete


Medications





 Current Medications


Aspirin 324 mg 324 mg 1X  ONCE PO  Last administered on 2/7/17at 12:59;  Start 2 /7/17 at 12:45;  Stop 2/7/17 at 12:48;  Status DC


Heparin Sodium/ Dextrose 500 ml @  19 mls/hr CONT  PRN IV SEE I/O RECORD Last 

administered on 2/8/17at 14:45;  Start 2/7/17 at 12:45;  Stop 2/9/17 at 15:30;  

Status DC


Heparin Sodium (Porcine) 1,950 unit PRN Q6HRS  PRN IV FOR UFH LEVEL LESS THAN 

0.2 Last administered on 2/7/17at 13:01;  Start 2/7/17 at 12:45;  Stop 2/9/17 

at 15:30;  Status DC


Ondansetron HCl (Zofran) 4 mg PRN Q8HRS  PRN IV NAUSEA/VOMITING;  Start 2/7/17 

at 12:45;  Stop 2/8/17 at 12:44;  Status DC


Morphine Sulfate 4 mg PRN Q2HR  PRN IV PAIN Last administered on 2/7/17at 14:03

;  Start 2/7/17 at 12:45;  Stop 2/8/17 at 12:44;  Status DC


Nitroglycerin (Nitrostat) 0.4 mg PRN Q5MIN  PRN SL CHEST PAIN Last administered 

on 2/7/17at 14:03;  Start 2/7/17 at 12:45;  Stop 2/8/17 at 12:44;  Status DC


Amlodipine Besylate (Norvasc) 5 mg DAILYWLUN PO  Last administered on 2/8/17at 

08:32;  Start 2/7/17 at 14:00;  Stop 2/8/17 at 16:02;  Status DC


Aspirin (Ecotrin) 81 mg DAILYWBKFT PO  Last administered on 2/18/17at 09:28;  

Start 2/7/17 at 14:00


Atorvastatin Calcium (Lipitor) 80 mg QHS PO  Last administered on 2/17/17at 20:

40;  Start 2/7/17 at 21:00


Carvedilol (Coreg) 25 mg BIDWMEALS PO  Last administered on 2/18/17at 09:28;  

Start 2/7/17 at 17:00


Clopidogrel Bisulfate (Plavix) 75 mg DAILY PO  Last administered on 2/7/17at 14:

28;  Start 2/7/17 at 14:00;  Stop 2/8/17 at 08:41;  Status DC


Isosorbide Mononitrate (Imdur) 30 mg DAILY PO  Last administered on 2/18/17at 09

:27;  Start 2/7/17 at 14:00


Furosemide (Lasix) 40 mg 1X  ONCE IVP  Last administered on 2/7/17at 14:30;  

Start 2/7/17 at 14:00;  Stop 2/7/17 at 14:23;  Status DC


Furosemide 40 mg 40 mg 1X  ONCE IVP  Last administered on 2/7/17at 14:30;  

Start 2/7/17 at 14:30;  Stop 2/7/17 at 14:38;  Status DC


Nitroglycerin/ Dextrose (Nitroglycerin Drip) 250 ml @ 0 mls/hr CONT  PRN IV SEE 

I/O RECORD Last administered on 2/7/17at 15:23;  Start 2/7/17 at 14:30;  Stop 2/ 8/17 at 16:02;  Status DC


Acetaminophen (Tylenol) 325 mg PRN Q6HRS  PRN PO MILD PAIN / TEMP Last 

administered on 2/13/17at 21:59;  Start 2/7/17 at 15:00


Acetaminophen/ Hydrocodone Bitart (Lortab 5/325) 1 tab PRN Q6HRS  PRN PO 

MODERATE TO SEVERE PAIN Last administered on 2/15/17at 21:18;  Start 2/7/17 at 

15:00


Hydralazine HCl (Apresoline) 10 mg PRN Q4HRS  PRN IVP ELEVATED BP, SEE COMMENTS

;  Start 2/7/17 at 15:00


Ondansetron HCl (Zofran) 4 mg PRN Q8HRS  PRN IV NAUSEA/VOMITING;  Start 2/7/17 

at 15:00


Albuterol Sulfate (Ventolin Neb Soln) 2.5 mg PRN Q4HRS  PRN NEB SHORTNESS OF 

BREATH;  Start 2/7/17 at 15:00


Insulin Aspart (Novolog) 0-7 UNITS TIDWMEALS SQ  Last administered on 2/18/17at 

13:30;  Start 2/7/17 at 17:00


Dextrose 12.5 gm PRN Q15MIN  PRN IV SEE COMMENTS;  Start 2/7/17 at 15:00


Info 1 each 1 each PRN DAILY  PRN MC SEE COMMENTS Last administered on 2/9/17at 

09:38;  Start 2/7/17 at 17:00;  Stop 2/10/17 at 08:25;  Status DC


Magnesium Sulfate/ Dextrose (Magnesium Sulfate PREMIX 2GM) 50 ml @ 25 mls/hr 

PRN DAILY  PRN IV for Mag < 1.7 on am labs;  Start 2/8/17 at 11:15


Lidocaine/Sodium Bicarbonate (Buffered Lidocaine 1%) 3 ml 1X  ONCE IJ  Last 

administered on 2/8/17at 14:03;  Start 2/8/17 at 12:30;  Stop 2/8/17 at 12:34;  

Status DC


Heparin Sodium (Porcine) 2,400 unit 1X  ONCE INT CAT  Last administered on 2/8/ 17at 14:04;  Start 2/8/17 at 12:30;  Stop 2/8/17 at 12:34;  Status DC


Heparin Sodium/ Sodium Chloride 60 unit 1X  ONCE IV  Last administered on 2/8/ 17at 14:04;  Start 2/8/17 at 12:30;  Stop 2/8/17 at 12:34;  Status DC


Lisinopril (Prinivil) 20 mg DAILY PO  Last administered on 2/12/17at 09:22;  

Start 2/8/17 at 16:00;  Stop 2/13/17 at 10:31;  Status DC


Hydralazine HCl 50 mg 50 mg BID PO  Last administered on 2/12/17at 20:55;  

Start 2/8/17 at 21:00;  Stop 2/13/17 at 10:31;  Status DC


Sodium Chloride (Iv Sodium Chloride 0.9% 1000ml Bag) 1,000 ml @  1,000 mls/hr 

Q1H PRN IV hypotension;  Start 2/8/17 at 18:28;  Stop 2/9/17 at 00:27;  Status 

DC


Diphenhydramine HCl (Benadryl) 25 mg 1X PRN  PRN IV ITCHING;  Start 2/8/17 at 18

:30;  Stop 2/9/17 at 18:29;  Status DC


Diphenhydramine HCl (Benadryl) 25 mg 1X PRN  PRN IV ITCHING;  Start 2/8/17 at 18

:30;  Stop 2/9/17 at 18:29;  Status DC


Sodium Chloride (Normal Saline Flush) 10 ml 1X PRN  PRN IV AP catheter pack;  

Start 2/8/17 at 18:30;  Stop 2/9/17 at 18:29;  Status DC


Sodium Chloride 10 ml 10 ml 1X PRN  PRN IV  catheter pack;  Start 2/8/17 at 18

:30;  Stop 2/9/17 at 18:29;  Status DC


Sodium Chloride (Iv Sodium Chloride 0.9% 1000ml Bag) 1,000 ml @  400 mls/hr 

Q2H30M PRN IV PATENCY;  Start 2/8/17 at 18:28;  Stop 2/9/17 at 06:27;  Status DC


Info (PHARMACY MONITORING -- do not chart) 1 each PRN DAILY  PRN MC SEE COMMENTS

;  Start 2/8/17 at 18:30


Info (PHARMACY MONITORING -- do not chart) 1 each PRN DAILY  PRN MC SEE COMMENTS

;  Start 2/9/17 at 10:30;  Status UNV


Info (PHARMACY MONITORING -- do not chart) 1 each PRN DAILY  PRN MC SEE COMMENTS

;  Start 2/9/17 at 10:30;  Status Cancel


Darbepoetin Valeriano (Aranesp) 60 mcg WEEKLYHS SQ  Last administered on 2/16/17at 20

:53;  Start 2/9/17 at 21:00


Clopidogrel Bisulfate (Plavix) 75 mg DAILYWBKFT PO ;  Start 2/10/17 at 08:00;  

Stop 2/10/17 at 08:00;  Status DC


Clopidogrel Bisulfate 75 mg 75 mg DAILYWBKFT PO  Last administered on 2/10/17at 

08:25;  Start 2/9/17 at 15:30;  Stop 2/11/17 at 18:54;  Status DC


Sodium Chloride (Iv Sodium Chloride 0.9% 1000ml Bag) 1,000 ml @  1,000 mls/hr 

Q1H PRN IV hypotension;  Start 2/10/17 at 08:11;  Stop 2/10/17 at 15:00;  

Status DC


Diphenhydramine HCl (Benadryl) 25 mg 1X PRN  PRN IV ITCHING;  Start 2/10/17 at 

08:15;  Stop 2/10/17 at 15:00;  Status DC


Diphenhydramine HCl (Benadryl) 25 mg 1X PRN  PRN IV ITCHING;  Start 2/10/17 at 

08:15;  Stop 2/10/17 at 15:00;  Status DC


Sodium Chloride (Normal Saline Flush) 10 ml 1X PRN  PRN IV AP catheter pack;  

Start 2/10/17 at 08:15;  Stop 2/10/17 at 15:00;  Status DC


Sodium Chloride 10 ml 10 ml 1X PRN  PRN IV  catheter pack;  Start 2/10/17 at 

08:15;  Stop 2/10/17 at 15:00;  Status DC


Sodium Chloride (Iv Sodium Chloride 0.9% 1000ml Bag) 1,000 ml @  400 mls/hr 

Q2H30M PRN IV PATENCY;  Start 2/10/17 at 08:11;  Stop 2/10/17 at 21:00;  Status 

DC


Info 1 each 1 each PRN DAILY  PRN MC SEE COMMENTS;  Start 2/10/17 at 08:15;  

Status UNV


Magnesium Sulfate/ Dextrose 100 ml @  100 mls/hr 1X  ONCE IV  Last administered 

on 2/10/17at 12:36;  Start 2/10/17 at 10:30;  Stop 2/10/17 at 11:29;  Status DC


Ceftriaxone Sodium 1 gm/ Sodium Chloride 50 ml @  100 mls/hr Q24H IV  Last 

administered on 2/15/17at 17:35;  Start 2/12/17 at 16:00;  Stop 2/16/17 at 15:02

;  Status DC


Sodium Chloride 1,000 ml @  1,000 mls/hr Q1H PRN IV hypotension;  Start 2/13/17 

at 08:48;  Stop 2/13/17 at 14:47;  Status DC


Sodium Chloride (Iv Sodium Chloride 0.9% 1000ml Bag) 1,000 ml @  400 mls/hr 

Q2H30M PRN IV PATENCY;  Start 2/13/17 at 08:48;  Stop 2/13/17 at 20:47;  Status 

DC


Info (PHARMACY MONITORING -- do not chart) 1 each PRN DAILY  PRN MC SEE COMMENTS

;  Start 2/13/17 at 09:00;  Status UNV


Lisinopril (Prinivil) 40 mg DAILY PO  Last administered on 2/18/17at 09:27;  

Start 2/14/17 at 09:00


Cyanocobalamin (Vitamin B-12) 1,000 mcg DAILY PO  Last administered on 2/18/ 17at 09:26;  Start 2/13/17 at 15:30


Ferrous Sulfate (Feosol) 325 mg BID PO  Last administered on 2/18/17at 09:26;  

Start 2/13/17 at 21:00


Heparin Sodium (Porcine) 10,000 unit STK-MED ONCE .ROUTE ;  Start 2/14/17 at 17:

14;  Stop 2/14/17 at 17:15;  Status DC


Lidocaine/ Epinephrine 20 ml 20 ml STK-MED ONCE .ROUTE ;  Start 2/14/17 at 17:14

;  Stop 2/14/17 at 17:15;  Status DC


Heparin Sodium/ Sodium Chloride 500 ml @  As Directed STK-MED ONCE .ROUTE ;  

Start 2/14/17 at 17:14;  Stop 2/14/17 at 17:15;  Status DC


Fentanyl Citrate (Fentanyl 2ml Vial) 100 mcg STK-MED ONCE .ROUTE ;  Start 2/14/ 17 at 17:35;  Stop 2/14/17 at 17:36;  Status DC


Midazolam HCl 2 mg 2 mg STK-MED ONCE .ROUTE ;  Start 2/14/17 at 17:35;  Stop 2/ 14/17 at 17:36;  Status DC


Cefazolin Sodium (Ancef 1gm Ivpb For Omni) 50 ml @ As Directed STK-MED ONCE IV 

;  Start 2/14/17 at 17:36;  Stop 2/14/17 at 17:37;  Status DC


Heparin Sodium/ Sodium Chloride 1,000 unit 1X  ONCE IART  Last administered on 2 /14/17at 18:04;  Start 2/14/17 at 18:00;  Stop 2/14/17 at 18:02;  Status DC


Midazolam HCl (Versed) 2 mg 1X  ONCE IV  Last administered on 2/14/17at 18:04;  

Start 2/14/17 at 18:00;  Stop 2/14/17 at 18:02;  Status DC


Fentanyl Citrate (Fentanyl 2ml Vial) 100 mcg 1X  ONCE IV  Last administered on 2 /14/17at 18:04;  Start 2/14/17 at 18:00;  Stop 2/14/17 at 18:02;  Status DC


Heparin Sodium (Porcine) 4300 unit 4,300 unit 1X  ONCE IV  Last administered on 

2/14/17at 18:05;  Start 2/14/17 at 18:00;  Stop 2/14/17 at 18:02;  Status DC


Cefazolin Sodium (Ancef 1gm Ivpb For Omni) 50 ml @  100 mls/hr 1X  ONCE IV  

Last administered on 2/14/17at 18:06;  Start 2/14/17 at 18:00;  Stop 2/14/17 at 

18:29;  Status DC


Lidocaine/ Epinephrine 10 ml 10 ml 1X  ONCE IJ  Last administered on 2/14/17at 

18:05;  Start 2/14/17 at 18:00;  Stop 2/14/17 at 18:02;  Status DC


Sodium Chloride 1,000 ml @  1,000 mls/hr Q1H PRN IV hypotension;  Start 2/15/17 

at 14:32;  Stop 2/15/17 at 20:31;  Status DC


Albumin Human (Albuminar) 200 ml @  200 mls/hr 1X PRN  PRN IV Hypotension;  

Start 2/15/17 at 14:45;  Stop 2/15/17 at 20:44;  Status DC


Sodium Chloride (Normal Saline Flush) 10 ml 1X PRN  PRN IV AP catheter pack;  

Start 2/15/17 at 14:45;  Stop 2/16/17 at 14:44;  Status DC


Sodium Chloride 10 ml 10 ml 1X PRN  PRN IV  catheter pack;  Start 2/15/17 at 

14:45;  Stop 2/16/17 at 14:44;  Status DC


Sodium Chloride (Iv Sodium Chloride 0.9% 1000ml Bag) 1,000 ml @  400 mls/hr 

Q2H30M PRN IV PATENCY;  Start 2/15/17 at 14:32;  Stop 2/16/17 at 02:31;  Status 

DC


Glimepiride 1 mg 1 mg DAILY PO  Last administered on 2/18/17at 09:28;  Start 2/ 16/17 at 17:00


Sodium Chloride (Iv Sodium Chloride 0.9% 1000ml Bag) 1,000 ml @  1,000 mls/hr 

Q1H PRN IV hypotension;  Start 2/17/17 at 09:18;  Stop 2/17/17 at 15:17;  

Status DC


Acetaminophen (Tylenol) 500 mg 1X PRN  PRN PO MILD PAIN / TEMP;  Start 2/17/17 

at 09:30;  Stop 2/18/17 at 09:29;  Status DC


Diphenhydramine HCl (Benadryl) 25 mg 1X PRN  PRN IV ITCHING;  Start 2/17/17 at 

09:30;  Stop 2/18/17 at 09:29;  Status DC


Diphenhydramine HCl (Benadryl) 25 mg 1X PRN  PRN IV ITCHING;  Start 2/17/17 at 

09:30;  Stop 2/18/17 at 09:29;  Status DC


Sodium Chloride (Normal Saline Flush) 10 ml 1X PRN  PRN IV AP catheter pack;  

Start 2/17/17 at 09:30;  Stop 2/18/17 at 09:29;  Status DC


Sodium Chloride (Normal Saline Flush) 10 ml 1X PRN  PRN IV  catheter pack;  

Start 2/17/17 at 09:30;  Stop 2/18/17 at 09:29;  Status DC


Labetalol HCl 10 mg 10 mg PRN Q1HR  PRN IVP SBP > 180;  Start 2/17/17 at 09:30;

  Stop 2/18/17 at 09:29;  Status DC


Sodium Chloride (Iv Sodium Chloride 0.9% 1000ml Bag) 1,000 ml @  400 mls/hr 

Q2H30M PRN IV PATENCY;  Start 2/17/17 at 09:18;  Stop 2/17/17 at 21:17;  Status 

DC


Info (PHARMACY MONITORING -- do not chart) 1 each PRN DAILY  PRN MC SEE COMMENTS

;  Start 2/17/17 at 09:30;  Status UNV


Amlodipine Besylate (Norvasc) 5 mg DAILY PO  Last administered on 2/18/17at 13:

25;  Start 2/18/17 at 12:00





Active Scripts


Active


Lisinopril 20 Mg Tablet 20 Mg PO QHS


Isosorbide Mononitrate Er (Isosorbide Mononitrate) 30 Mg Tab.er.24h 30 Mg PO 

DAILY


Hydralazine Hcl 50 Mg Tablet 100 Mg PO TID


Carvedilol 12.5 Mg Tablet 25 Mg PO BIDWMEALS


Atorvastatin Calcium 40 Mg Tablet 80 Mg PO QHS


Aspirin Ec (Aspirin) 81 Mg Tablet.dr 81 Mg PO DAILYWBKFT


Amlodipine Besylate 5 Mg Tablet 5 Mg PO DAILYWLUN


Reported


Clopidogrel (Clopidogrel Bisulfate) 75 Mg Tablet 75 Mg PO DAILY


Glyburide 5 Mg Tablet 1 Tab PO BIDWMEALS


Lisinopril 20 Mg Tablet 1 Tab PO DAILY


Vitals/I & O





 Vital Sign - Last 24 Hours








 2/17/17 2/17/17 2/17/17 2/17/17





 15:14 17:57 19:10 20:00


 


Temp 98.7  98.5 





 98.7  98.5 


 


Pulse 66 66 69 


 


Resp 17  18 


 


B/P 155/54 155/54 169/53 


 


Pulse Ox 94  94 


 


O2 Delivery Room Air  Room Air Room Air


 


    





    





 2/17/17 2/18/17 2/18/17 2/18/17





 23:09 03:30 07:20 08:00


 


Temp 98.8 97.8 98.0 





 98.8 97.8 98.0 


 


Pulse 68 70 67 


 


Resp 21 19 18 


 


B/P 165/52 169/68 166/76 


 


Pulse Ox 97 97 95 


 


O2 Delivery Room Air Room Air  Room Air


 


    





    





 2/18/17 2/18/17 2/18/17 2/18/17





 09:27 09:27 09:28 10:07


 


Temp    98.8





    98.8


 


Pulse 67 67 67 69


 


Resp    20


 


B/P 166/76 166/76 166/76 171/58


 


Pulse Ox    94


 


O2 Delivery    Room Air


 


    





    





 2/18/17   





 13:25   


 


B/P 159/71   














 Intake and Output   


 


 2/17/17 2/17/17 2/18/17





 15:00 23:00 07:00


 


Intake Total   150 ml


 


Output Total   300 ml


 


Balance   -150 ml














ARTEMIO NUNES MD Feb 18, 2017 13:48

## 2017-02-19 NOTE — PDOC
PROGRESS NOTES


Chief Complaint


Chief Complaint


cc: sob 





A//P


1.  Acute on chronic systolic congestive heart failure. improved 


2.  Non-ST-segment elevation myocardial infarction.


3.  WALTER with CKD


4.  Ischemic cardiomyopathy.


5.  Malignant hypertension, Better


6.  Anemia


7.  Hyperlipidemia.


8.  Psoriasis.


9. CAD


10. Anemia


11. UTI not POA


12. DM2





Plan 


dc abx


HD per  nephrology 


add amlodipine 


low hemoglobin but pt declined to have blood products due to [employer] reasons. 

risks and benefits explained. 


check vitb12, fa, add iron po


SSI 


Lipitor 


talked to daughter at bedside


CHECK hba1c, increase glimepiride 2mg daily, levemir 10u qhs


talked to CARD, NO intervention now given low HB, PT WILL get HD perm cath today

, dc when outpt HD set up





History of Present Illness


History of Present Illness


sob better


no fever


no chills. 


no chest pain 





still dysuria, much better. no frequent urination





Vitals


Vitals





 Vital Signs








  Date Time  Temp Pulse Resp B/P Pulse Ox O2 Delivery O2 Flow Rate FiO2


 


2/19/17 10:20 97.8 65 18 150/64 95 Room Air  





 97.8       











Physical Exam


General:  Alert, Oriented X3


Heart:  Regular rate, Normal S1, Normal S2, No murmurs, Gallops


Lungs:  Clear, Other


Abdomen:  Normal bowel sounds, Soft, No tenderness, No hepatosplenomegaly, No 

masses


Extremities:  No clubbing, No cyanosis, No edema, Normal pulses, No tenderness/

swelling


Skin:  No breakdown, No significant lesion, Other (psoriasis )





Labs


LABS





Laboratory Tests








Test


  2/18/17


16:38 2/18/17


20:41 2/19/17


07:34 2/19/17


11:52


 


Glucose (Fingerstick)


  282mg/dL


(70-99) 219mg/dL


(70-99) 214mg/dL


(70-99) 267mg/dL


(70-99)











Review of Systems


Review of Systems


no fever, chills, sob or chest pain





Assessment and Plan


Assessmemt and Plan


 Problems


Medical Problems:


(1) Congestive heart failure


Status: Acute  





(2) NSTEMI (non-ST elevated myocardial infarction)


Status: Acute  








Problems:  





Comment


Review of Relevant


I have reviewed the following items lore (where applicable) has been applied.


Labs





Laboratory Tests








Test


  2/17/17


17:27 2/17/17


21:02 2/18/17


07:24 2/18/17


11:17


 


Glucose (Fingerstick)


  250mg/dL


(70-99) 294mg/dL


(70-99) 218mg/dL


(70-99) 323mg/dL


(70-99)














Test


  2/18/17


16:38 2/18/17


20:41 2/19/17


07:34 2/19/17


11:52


 


Glucose (Fingerstick)


  282mg/dL


(70-99) 219mg/dL


(70-99) 214mg/dL


(70-99) 267mg/dL


(70-99)








Laboratory Tests








Test


  2/18/17


16:38 2/18/17


20:41 2/19/17


07:34 2/19/17


11:52


 


Glucose (Fingerstick)


  282mg/dL


(70-99) 219mg/dL


(70-99) 214mg/dL


(70-99) 267mg/dL


(70-99)








Microbiology


2/10/17 Blood Culture - Final, Complete


          NO GROWTH AFTER 5 DAYS


2/12/17 Urine Culture - Final, Complete


          


2/12/17 Urine Culture Result 1 (SEAN) - Final, Complete


Medications





 Current Medications


Aspirin 324 mg 324 mg 1X  ONCE PO  Last administered on 2/7/17at 12:59;  Start 2 /7/17 at 12:45;  Stop 2/7/17 at 12:48;  Status DC


Heparin Sodium/ Dextrose 500 ml @  19 mls/hr CONT  PRN IV SEE I/O RECORD Last 

administered on 2/8/17at 14:45;  Start 2/7/17 at 12:45;  Stop 2/9/17 at 15:30;  

Status DC


Heparin Sodium (Porcine) 1,950 unit PRN Q6HRS  PRN IV FOR UFH LEVEL LESS THAN 

0.2 Last administered on 2/7/17at 13:01;  Start 2/7/17 at 12:45;  Stop 2/9/17 

at 15:30;  Status DC


Ondansetron HCl (Zofran) 4 mg PRN Q8HRS  PRN IV NAUSEA/VOMITING;  Start 2/7/17 

at 12:45;  Stop 2/8/17 at 12:44;  Status DC


Morphine Sulfate 4 mg PRN Q2HR  PRN IV PAIN Last administered on 2/7/17at 14:03

;  Start 2/7/17 at 12:45;  Stop 2/8/17 at 12:44;  Status DC


Nitroglycerin (Nitrostat) 0.4 mg PRN Q5MIN  PRN SL CHEST PAIN Last administered 

on 2/7/17at 14:03;  Start 2/7/17 at 12:45;  Stop 2/8/17 at 12:44;  Status DC


Amlodipine Besylate (Norvasc) 5 mg DAILYWLUN PO  Last administered on 2/8/17at 

08:32;  Start 2/7/17 at 14:00;  Stop 2/8/17 at 16:02;  Status DC


Aspirin (Ecotrin) 81 mg DAILYWBKFT PO  Last administered on 2/19/17at 09:21;  

Start 2/7/17 at 14:00


Atorvastatin Calcium (Lipitor) 80 mg QHS PO  Last administered on 2/18/17at 20:

36;  Start 2/7/17 at 21:00


Carvedilol (Coreg) 25 mg BIDWMEALS PO  Last administered on 2/19/17at 09:23;  

Start 2/7/17 at 17:00


Clopidogrel Bisulfate (Plavix) 75 mg DAILY PO  Last administered on 2/7/17at 14:

28;  Start 2/7/17 at 14:00;  Stop 2/8/17 at 08:41;  Status DC


Isosorbide Mononitrate (Imdur) 30 mg DAILY PO  Last administered on 2/19/17at 09

:22;  Start 2/7/17 at 14:00


Furosemide (Lasix) 40 mg 1X  ONCE IVP  Last administered on 2/7/17at 14:30;  

Start 2/7/17 at 14:00;  Stop 2/7/17 at 14:23;  Status DC


Furosemide 40 mg 40 mg 1X  ONCE IVP  Last administered on 2/7/17at 14:30;  

Start 2/7/17 at 14:30;  Stop 2/7/17 at 14:38;  Status DC


Nitroglycerin/ Dextrose (Nitroglycerin Drip) 250 ml @ 0 mls/hr CONT  PRN IV SEE 

I/O RECORD Last administered on 2/7/17at 15:23;  Start 2/7/17 at 14:30;  Stop 2/ 8/17 at 16:02;  Status DC


Acetaminophen (Tylenol) 325 mg PRN Q6HRS  PRN PO MILD PAIN / TEMP Last 

administered on 2/13/17at 21:59;  Start 2/7/17 at 15:00


Acetaminophen/ Hydrocodone Bitart (Lortab 5/325) 1 tab PRN Q6HRS  PRN PO 

MODERATE TO SEVERE PAIN Last administered on 2/15/17at 21:18;  Start 2/7/17 at 

15:00


Hydralazine HCl (Apresoline) 10 mg PRN Q4HRS  PRN IVP ELEVATED BP, SEE COMMENTS

;  Start 2/7/17 at 15:00


Ondansetron HCl (Zofran) 4 mg PRN Q8HRS  PRN IV NAUSEA/VOMITING;  Start 2/7/17 

at 15:00


Albuterol Sulfate (Ventolin Neb Soln) 2.5 mg PRN Q4HRS  PRN NEB SHORTNESS OF 

BREATH;  Start 2/7/17 at 15:00


Insulin Aspart (Novolog) 0-7 UNITS TIDWMEALS SQ  Last administered on 2/19/17at 

09:28;  Start 2/7/17 at 17:00


Dextrose 12.5 gm PRN Q15MIN  PRN IV SEE COMMENTS;  Start 2/7/17 at 15:00


Info 1 each 1 each PRN DAILY  PRN MC SEE COMMENTS Last administered on 2/9/17at 

09:38;  Start 2/7/17 at 17:00;  Stop 2/10/17 at 08:25;  Status DC


Magnesium Sulfate/ Dextrose (Magnesium Sulfate PREMIX 2GM) 50 ml @ 25 mls/hr 

PRN DAILY  PRN IV for Mag < 1.7 on am labs;  Start 2/8/17 at 11:15


Lidocaine/Sodium Bicarbonate (Buffered Lidocaine 1%) 3 ml 1X  ONCE IJ  Last 

administered on 2/8/17at 14:03;  Start 2/8/17 at 12:30;  Stop 2/8/17 at 12:34;  

Status DC


Heparin Sodium (Porcine) 2,400 unit 1X  ONCE INT CAT  Last administered on 2/8/ 17at 14:04;  Start 2/8/17 at 12:30;  Stop 2/8/17 at 12:34;  Status DC


Heparin Sodium/ Sodium Chloride 60 unit 1X  ONCE IV  Last administered on 2/8/ 17at 14:04;  Start 2/8/17 at 12:30;  Stop 2/8/17 at 12:34;  Status DC


Lisinopril (Prinivil) 20 mg DAILY PO  Last administered on 2/12/17at 09:22;  

Start 2/8/17 at 16:00;  Stop 2/13/17 at 10:31;  Status DC


Hydralazine HCl 50 mg 50 mg BID PO  Last administered on 2/12/17at 20:55;  

Start 2/8/17 at 21:00;  Stop 2/13/17 at 10:31;  Status DC


Sodium Chloride (Iv Sodium Chloride 0.9% 1000ml Bag) 1,000 ml @  1,000 mls/hr 

Q1H PRN IV hypotension;  Start 2/8/17 at 18:28;  Stop 2/9/17 at 00:27;  Status 

DC


Diphenhydramine HCl (Benadryl) 25 mg 1X PRN  PRN IV ITCHING;  Start 2/8/17 at 18

:30;  Stop 2/9/17 at 18:29;  Status DC


Diphenhydramine HCl (Benadryl) 25 mg 1X PRN  PRN IV ITCHING;  Start 2/8/17 at 18

:30;  Stop 2/9/17 at 18:29;  Status DC


Sodium Chloride (Normal Saline Flush) 10 ml 1X PRN  PRN IV AP catheter pack;  

Start 2/8/17 at 18:30;  Stop 2/9/17 at 18:29;  Status DC


Sodium Chloride 10 ml 10 ml 1X PRN  PRN IV  catheter pack;  Start 2/8/17 at 18

:30;  Stop 2/9/17 at 18:29;  Status DC


Sodium Chloride (Iv Sodium Chloride 0.9% 1000ml Bag) 1,000 ml @  400 mls/hr 

Q2H30M PRN IV PATENCY;  Start 2/8/17 at 18:28;  Stop 2/9/17 at 06:27;  Status DC


Info (PHARMACY MONITORING -- do not chart) 1 each PRN DAILY  PRN MC SEE COMMENTS

;  Start 2/8/17 at 18:30


Info (PHARMACY MONITORING -- do not chart) 1 each PRN DAILY  PRN MC SEE COMMENTS

;  Start 2/9/17 at 10:30;  Status UNV


Info (PHARMACY MONITORING -- do not chart) 1 each PRN DAILY  PRN MC SEE COMMENTS

;  Start 2/9/17 at 10:30;  Status Cancel


Darbepoetin Valeriano (Aranesp) 60 mcg WEEKLYHS SQ  Last administered on 2/16/17at 20

:53;  Start 2/9/17 at 21:00


Clopidogrel Bisulfate (Plavix) 75 mg DAILYWBKFT PO ;  Start 2/10/17 at 08:00;  

Stop 2/10/17 at 08:00;  Status DC


Clopidogrel Bisulfate 75 mg 75 mg DAILYWBKFT PO  Last administered on 2/10/17at 

08:25;  Start 2/9/17 at 15:30;  Stop 2/11/17 at 18:54;  Status DC


Sodium Chloride (Iv Sodium Chloride 0.9% 1000ml Bag) 1,000 ml @  1,000 mls/hr 

Q1H PRN IV hypotension;  Start 2/10/17 at 08:11;  Stop 2/10/17 at 15:00;  

Status DC


Diphenhydramine HCl (Benadryl) 25 mg 1X PRN  PRN IV ITCHING;  Start 2/10/17 at 

08:15;  Stop 2/10/17 at 15:00;  Status DC


Diphenhydramine HCl (Benadryl) 25 mg 1X PRN  PRN IV ITCHING;  Start 2/10/17 at 

08:15;  Stop 2/10/17 at 15:00;  Status DC


Sodium Chloride (Normal Saline Flush) 10 ml 1X PRN  PRN IV AP catheter pack;  

Start 2/10/17 at 08:15;  Stop 2/10/17 at 15:00;  Status DC


Sodium Chloride 10 ml 10 ml 1X PRN  PRN IV  catheter pack;  Start 2/10/17 at 

08:15;  Stop 2/10/17 at 15:00;  Status DC


Sodium Chloride (Iv Sodium Chloride 0.9% 1000ml Bag) 1,000 ml @  400 mls/hr 

Q2H30M PRN IV PATENCY;  Start 2/10/17 at 08:11;  Stop 2/10/17 at 21:00;  Status 

DC


Info 1 each 1 each PRN DAILY  PRN MC SEE COMMENTS;  Start 2/10/17 at 08:15;  

Status UNV


Magnesium Sulfate/ Dextrose 100 ml @  100 mls/hr 1X  ONCE IV  Last administered 

on 2/10/17at 12:36;  Start 2/10/17 at 10:30;  Stop 2/10/17 at 11:29;  Status DC


Ceftriaxone Sodium 1 gm/ Sodium Chloride 50 ml @  100 mls/hr Q24H IV  Last 

administered on 2/15/17at 17:35;  Start 2/12/17 at 16:00;  Stop 2/16/17 at 15:02

;  Status DC


Sodium Chloride 1,000 ml @  1,000 mls/hr Q1H PRN IV hypotension;  Start 2/13/17 

at 08:48;  Stop 2/13/17 at 14:47;  Status DC


Sodium Chloride (Iv Sodium Chloride 0.9% 1000ml Bag) 1,000 ml @  400 mls/hr 

Q2H30M PRN IV PATENCY;  Start 2/13/17 at 08:48;  Stop 2/13/17 at 20:47;  Status 

DC


Info (PHARMACY MONITORING -- do not chart) 1 each PRN DAILY  PRN MC SEE COMMENTS

;  Start 2/13/17 at 09:00;  Status UNV


Lisinopril (Prinivil) 40 mg DAILY PO  Last administered on 2/19/17at 09:23;  

Start 2/14/17 at 09:00


Cyanocobalamin (Vitamin B-12) 1,000 mcg DAILY PO  Last administered on 2/19/ 17at 09:21;  Start 2/13/17 at 15:30


Ferrous Sulfate (Feosol) 325 mg BID PO  Last administered on 2/19/17at 09:21;  

Start 2/13/17 at 21:00


Heparin Sodium (Porcine) 10,000 unit STK-MED ONCE .ROUTE ;  Start 2/14/17 at 17:

14;  Stop 2/14/17 at 17:15;  Status DC


Lidocaine/ Epinephrine 20 ml 20 ml STK-MED ONCE .ROUTE ;  Start 2/14/17 at 17:14

;  Stop 2/14/17 at 17:15;  Status DC


Heparin Sodium/ Sodium Chloride 500 ml @  As Directed STK-MED ONCE .ROUTE ;  

Start 2/14/17 at 17:14;  Stop 2/14/17 at 17:15;  Status DC


Fentanyl Citrate (Fentanyl 2ml Vial) 100 mcg STK-MED ONCE .ROUTE ;  Start 2/14/ 17 at 17:35;  Stop 2/14/17 at 17:36;  Status DC


Midazolam HCl 2 mg 2 mg STK-MED ONCE .ROUTE ;  Start 2/14/17 at 17:35;  Stop 2/ 14/17 at 17:36;  Status DC


Cefazolin Sodium (Ancef 1gm Ivpb For Omni) 50 ml @ As Directed STK-MED ONCE IV 

;  Start 2/14/17 at 17:36;  Stop 2/14/17 at 17:37;  Status DC


Heparin Sodium/ Sodium Chloride 1,000 unit 1X  ONCE IART  Last administered on 2 /14/17at 18:04;  Start 2/14/17 at 18:00;  Stop 2/14/17 at 18:02;  Status DC


Midazolam HCl (Versed) 2 mg 1X  ONCE IV  Last administered on 2/14/17at 18:04;  

Start 2/14/17 at 18:00;  Stop 2/14/17 at 18:02;  Status DC


Fentanyl Citrate (Fentanyl 2ml Vial) 100 mcg 1X  ONCE IV  Last administered on 2 /14/17at 18:04;  Start 2/14/17 at 18:00;  Stop 2/14/17 at 18:02;  Status DC


Heparin Sodium (Porcine) 4300 unit 4,300 unit 1X  ONCE IV  Last administered on 

2/14/17at 18:05;  Start 2/14/17 at 18:00;  Stop 2/14/17 at 18:02;  Status DC


Cefazolin Sodium (Ancef 1gm Ivpb For Omni) 50 ml @  100 mls/hr 1X  ONCE IV  

Last administered on 2/14/17at 18:06;  Start 2/14/17 at 18:00;  Stop 2/14/17 at 

18:29;  Status DC


Lidocaine/ Epinephrine 10 ml 10 ml 1X  ONCE IJ  Last administered on 2/14/17at 

18:05;  Start 2/14/17 at 18:00;  Stop 2/14/17 at 18:02;  Status DC


Sodium Chloride 1,000 ml @  1,000 mls/hr Q1H PRN IV hypotension;  Start 2/15/17 

at 14:32;  Stop 2/15/17 at 20:31;  Status DC


Albumin Human (Albuminar) 200 ml @  200 mls/hr 1X PRN  PRN IV Hypotension;  

Start 2/15/17 at 14:45;  Stop 2/15/17 at 20:44;  Status DC


Sodium Chloride (Normal Saline Flush) 10 ml 1X PRN  PRN IV AP catheter pack;  

Start 2/15/17 at 14:45;  Stop 2/16/17 at 14:44;  Status DC


Sodium Chloride 10 ml 10 ml 1X PRN  PRN IV  catheter pack;  Start 2/15/17 at 

14:45;  Stop 2/16/17 at 14:44;  Status DC


Sodium Chloride (Iv Sodium Chloride 0.9% 1000ml Bag) 1,000 ml @  400 mls/hr 

Q2H30M PRN IV PATENCY;  Start 2/15/17 at 14:32;  Stop 2/16/17 at 02:31;  Status 

DC


Glimepiride 1 mg 1 mg DAILY PO  Last administered on 2/18/17at 09:28;  Start 2/ 16/17 at 17:00;  Stop 2/18/17 at 13:46;  Status DC


Sodium Chloride (Iv Sodium Chloride 0.9% 1000ml Bag) 1,000 ml @  1,000 mls/hr 

Q1H PRN IV hypotension;  Start 2/17/17 at 09:18;  Stop 2/17/17 at 15:17;  

Status DC


Acetaminophen (Tylenol) 500 mg 1X PRN  PRN PO MILD PAIN / TEMP;  Start 2/17/17 

at 09:30;  Stop 2/18/17 at 09:29;  Status DC


Diphenhydramine HCl (Benadryl) 25 mg 1X PRN  PRN IV ITCHING;  Start 2/17/17 at 

09:30;  Stop 2/18/17 at 09:29;  Status DC


Diphenhydramine HCl (Benadryl) 25 mg 1X PRN  PRN IV ITCHING;  Start 2/17/17 at 

09:30;  Stop 2/18/17 at 09:29;  Status DC


Sodium Chloride (Normal Saline Flush) 10 ml 1X PRN  PRN IV AP catheter pack;  

Start 2/17/17 at 09:30;  Stop 2/18/17 at 09:29;  Status DC


Sodium Chloride (Normal Saline Flush) 10 ml 1X PRN  PRN IV  catheter pack;  

Start 2/17/17 at 09:30;  Stop 2/18/17 at 09:29;  Status DC


Labetalol HCl 10 mg 10 mg PRN Q1HR  PRN IVP SBP > 180;  Start 2/17/17 at 09:30;

  Stop 2/18/17 at 09:29;  Status DC


Sodium Chloride (Iv Sodium Chloride 0.9% 1000ml Bag) 1,000 ml @  400 mls/hr 

Q2H30M PRN IV PATENCY;  Start 2/17/17 at 09:18;  Stop 2/17/17 at 21:17;  Status 

DC


Info (PHARMACY MONITORING -- do not chart) 1 each PRN DAILY  PRN MC SEE COMMENTS

;  Start 2/17/17 at 09:30;  Status UNV


Amlodipine Besylate (Norvasc) 5 mg DAILY PO  Last administered on 2/19/17at 09:

22;  Start 2/18/17 at 12:00


Glimepiride (Amaryl) 2 mg DAILY PO  Last administered on 2/19/17at 09:21;  

Start 2/19/17 at 09:00





Active Scripts


Active


Lisinopril 20 Mg Tablet 20 Mg PO QHS


Isosorbide Mononitrate Er (Isosorbide Mononitrate) 30 Mg Tab.er.24h 30 Mg PO 

DAILY


Hydralazine Hcl 50 Mg Tablet 100 Mg PO TID


Carvedilol 12.5 Mg Tablet 25 Mg PO BIDWMEALS


Atorvastatin Calcium 40 Mg Tablet 80 Mg PO QHS


Aspirin Ec (Aspirin) 81 Mg Tablet.dr 81 Mg PO DAILYWBKFT


Amlodipine Besylate 5 Mg Tablet 5 Mg PO DAILYWLUN


Reported


Clopidogrel (Clopidogrel Bisulfate) 75 Mg Tablet 75 Mg PO DAILY


Glyburide 5 Mg Tablet 1 Tab PO BIDWMEALS


Lisinopril 20 Mg Tablet 1 Tab PO DAILY


Vitals/I & O





 Vital Sign - Last 24 Hours








 2/18/17 2/18/17 2/18/17 2/18/17





 13:25 14:08 17:17 19:10


 


Temp  98.7  97.5





  98.7  97.5


 


Pulse  69  70


 


Resp  18  21


 


B/P 159/71 157/70 181/60 156/58


 


Pulse Ox  95  96


 


O2 Delivery  Room Air  Room Air


 


    





    





 2/18/17 2/18/17 2/19/17 2/19/17





 20:00 23:34 03:45 07:30


 


Temp  98.2 98.0 97.7





  98.2 98.0 97.7


 


Pulse  72 73 60


 


Resp  22 18 20


 


B/P  166/70 168/65 158/66


 


Pulse Ox  94 97 94


 


O2 Delivery Room Air Room Air Room Air Room Air


 


    





    





 2/19/17 2/19/17 2/19/17 2/19/17





 09:22 09:22 09:23 09:23


 


Pulse 60 60 60 60


 


B/P 158/66 158/66 158/66 158/66





 2/19/17   





 10:20   


 


Temp 97.8   





 97.8   


 


Pulse 65   


 


Resp 18   


 


B/P 150/64   


 


Pulse Ox 95   


 


O2 Delivery Room Air   














 Intake and Output   


 


 2/18/17 2/18/17 2/19/17





 15:00 23:00 07:00


 


Intake Total  1000 ml 50 ml


 


Output Total  100 ml 300 ml


 


Balance  900 ml -250 ml














ARTEMIO NUNES MD Feb 19, 2017 13:10

## 2017-02-20 NOTE — PDOC
PROGRESS NOTES


Chief Complaint


Chief Complaint


cc: sob 





A//P


1.  Acute on chronic systolic congestive heart failure. Stable


2.  Non-ST-segment elevation myocardial infarction.


3.  WALTER with CKD on HD


4.  Ischemic cardiomyopathy.


5.  Malignant hypertension, Better


6.  Anemia


7.  Hyperlipidemia.


8.  Psoriasis.


9. CAD


10. Anemia


11. UTI not POA


12. DM2





Plan 


HD per nephrology 


monitor hemoglobin 


BP stable 


SSI with levemir 


SW to find place for out HD 


Labs reviewed.





History of Present Illness


History of Present Illness


doing better


no fever


no chills


no acute events





Vitals


Vitals





 Vital Signs








  Date Time  Temp Pulse Resp B/P Pulse Ox O2 Delivery O2 Flow Rate FiO2


 


2/20/17 07:00 98.1 68 17 155/58 98 Room Air  





 98.1       











Physical Exam


General:  Alert, Oriented X3


Heart:  Regular rate, Normal S1, Normal S2, No murmurs, Gallops


Lungs:  Clear, Other


Abdomen:  Normal bowel sounds, Soft, No tenderness, No hepatosplenomegaly, No 

masses


Extremities:  No clubbing, No cyanosis, No edema, Normal pulses, No tenderness/

swelling


Skin:  No breakdown, No significant lesion, Other (psoriasis )





Labs


LABS





Laboratory Tests








Test


  2/19/17


11:52 2/19/17


16:43 2/19/17


20:15 2/20/17


05:45


 


Glucose (Fingerstick)


  267mg/dL


(70-99) 227mg/dL


(70-99) 330mg/dL


(70-99) 


 


 


White Blood Count


  


  


  


  6.9x10^3/uL


(4.0-11.0)


 


Red Blood Count


  


  


  


  2.31x10^6/uL


(3.50-5.40)


 


Hemoglobin


  


  


  


  7.1g/dL


(12.0-15.5)


 


Hematocrit


  


  


  


  21.6%


(36.0-47.0)


 


Mean Corpuscular Volume    94fL () 


 


Mean Corpuscular Hemoglobin    31pg (25-35) 


 


Mean Corpuscular Hemoglobin


Concent 


  


  


  33g/dL (31-37) 


 


 


Red Cell Distribution Width


  


  


  


  14.8%


(11.5-14.5)


 


Platelet Count


  


  


  


  199x10^3/uL


(140-400)


 


Neutrophils (%) (Auto)    61% (31-73) 


 


Lymphocytes (%) (Auto)    26% (24-48) 


 


Monocytes (%) (Auto)    9% (0-9) 


 


Eosinophils (%) (Auto)    4% (0-3) 


 


Basophils (%) (Auto)    1% (0-3) 


 


Neutrophils # (Auto)


  


  


  


  4.2x10^3uL


(1.8-7.7)


 


Lymphocytes # (Auto)


  


  


  


  1.8x10^3/uL


(1.0-4.8)


 


Monocytes # (Auto)


  


  


  


  0.6x10^3/uL


(0.0-1.1)


 


Eosinophils # (Auto)


  


  


  


  0.3x10^3/uL


(0.0-0.7)


 


Basophils # (Auto)


  


  


  


  0.0x10^3/uL


(0.0-0.2)


 


Sodium Level


  


  


  


  139mmol/L


(136-145)


 


Potassium Level


  


  


  


  4.1mmol/L


(3.5-5.1)


 


Chloride Level


  


  


  


  104mmol/L


()


 


Carbon Dioxide Level


  


  


  


  24mmol/L


(21-32)


 


Anion Gap    11 (6-14) 


 


Blood Urea Nitrogen    53mg/dL (7-20) 


 


Creatinine


  


  


  


  4.1mg/dL


(0.6-1.0)


 


Estimated GFR


(Cockcroft-Gault) 


  


  


  11.1 


 


 


Glucose Level


  


  


  


  220mg/dL


(70-99)


 


Calcium Level


  


  


  


  8.1mg/dL


(8.5-10.1)


 


Phosphorus Level


  


  


  


  4.0mg/dL


(2.6-4.7)


 


Albumin


  


  


  


  2.2g/dL


(3.4-5.0)














Test


  2/20/17


07:13 


  


  


 


 


Glucose (Fingerstick)


  197mg/dL


(70-99) 


  


  


 











Assessment and Plan


Assessmemt and Plan


 Problems


Medical Problems:


(1) Congestive heart failure


Status: Acute  





(2) NSTEMI (non-ST elevated myocardial infarction)


Status: Acute  








Problems:  





Comment


Review of Relevant


I have reviewed the following items lore (where applicable) has been applied.


Labs





Laboratory Tests








Test


  2/18/17


11:17 2/18/17


16:38 2/18/17


20:41 2/19/17


07:34


 


Glucose (Fingerstick)


  323mg/dL


(70-99) 282mg/dL


(70-99) 219mg/dL


(70-99) 214mg/dL


(70-99)














Test


  2/19/17


11:52 2/19/17


16:43 2/19/17


20:15 2/20/17


05:45


 


Glucose (Fingerstick)


  267mg/dL


(70-99) 227mg/dL


(70-99) 330mg/dL


(70-99) 


 


 


White Blood Count


  


  


  


  6.9x10^3/uL


(4.0-11.0)


 


Red Blood Count


  


  


  


  2.31x10^6/uL


(3.50-5.40)


 


Hemoglobin


  


  


  


  7.1g/dL


(12.0-15.5)


 


Hematocrit


  


  


  


  21.6%


(36.0-47.0)


 


Mean Corpuscular Volume    94fL () 


 


Mean Corpuscular Hemoglobin    31pg (25-35) 


 


Mean Corpuscular Hemoglobin


Concent 


  


  


  33g/dL (31-37) 


 


 


Red Cell Distribution Width


  


  


  


  14.8%


(11.5-14.5)


 


Platelet Count


  


  


  


  199x10^3/uL


(140-400)


 


Neutrophils (%) (Auto)    61% (31-73) 


 


Lymphocytes (%) (Auto)    26% (24-48) 


 


Monocytes (%) (Auto)    9% (0-9) 


 


Eosinophils (%) (Auto)    4% (0-3) 


 


Basophils (%) (Auto)    1% (0-3) 


 


Neutrophils # (Auto)


  


  


  


  4.2x10^3uL


(1.8-7.7)


 


Lymphocytes # (Auto)


  


  


  


  1.8x10^3/uL


(1.0-4.8)


 


Monocytes # (Auto)


  


  


  


  0.6x10^3/uL


(0.0-1.1)


 


Eosinophils # (Auto)


  


  


  


  0.3x10^3/uL


(0.0-0.7)


 


Basophils # (Auto)


  


  


  


  0.0x10^3/uL


(0.0-0.2)


 


Sodium Level


  


  


  


  139mmol/L


(136-145)


 


Potassium Level


  


  


  


  4.1mmol/L


(3.5-5.1)


 


Chloride Level


  


  


  


  104mmol/L


()


 


Carbon Dioxide Level


  


  


  


  24mmol/L


(21-32)


 


Anion Gap    11 (6-14) 


 


Blood Urea Nitrogen    53mg/dL (7-20) 


 


Creatinine


  


  


  


  4.1mg/dL


(0.6-1.0)


 


Estimated GFR


(Cockcroft-Gault) 


  


  


  11.1 


 


 


Glucose Level


  


  


  


  220mg/dL


(70-99)


 


Calcium Level


  


  


  


  8.1mg/dL


(8.5-10.1)


 


Phosphorus Level


  


  


  


  4.0mg/dL


(2.6-4.7)


 


Albumin


  


  


  


  2.2g/dL


(3.4-5.0)














Test


  2/20/17


07:13 


  


  


 


 


Glucose (Fingerstick)


  197mg/dL


(70-99) 


  


  


 








Laboratory Tests








Test


  2/19/17


11:52 2/19/17


16:43 2/19/17


20:15 2/20/17


05:45


 


Glucose (Fingerstick)


  267mg/dL


(70-99) 227mg/dL


(70-99) 330mg/dL


(70-99) 


 


 


White Blood Count


  


  


  


  6.9x10^3/uL


(4.0-11.0)


 


Red Blood Count


  


  


  


  2.31x10^6/uL


(3.50-5.40)


 


Hemoglobin


  


  


  


  7.1g/dL


(12.0-15.5)


 


Hematocrit


  


  


  


  21.6%


(36.0-47.0)


 


Mean Corpuscular Volume    94fL () 


 


Mean Corpuscular Hemoglobin    31pg (25-35) 


 


Mean Corpuscular Hemoglobin


Concent 


  


  


  33g/dL (31-37) 


 


 


Red Cell Distribution Width


  


  


  


  14.8%


(11.5-14.5)


 


Platelet Count


  


  


  


  199x10^3/uL


(140-400)


 


Neutrophils (%) (Auto)    61% (31-73) 


 


Lymphocytes (%) (Auto)    26% (24-48) 


 


Monocytes (%) (Auto)    9% (0-9) 


 


Eosinophils (%) (Auto)    4% (0-3) 


 


Basophils (%) (Auto)    1% (0-3) 


 


Neutrophils # (Auto)


  


  


  


  4.2x10^3uL


(1.8-7.7)


 


Lymphocytes # (Auto)


  


  


  


  1.8x10^3/uL


(1.0-4.8)


 


Monocytes # (Auto)


  


  


  


  0.6x10^3/uL


(0.0-1.1)


 


Eosinophils # (Auto)


  


  


  


  0.3x10^3/uL


(0.0-0.7)


 


Basophils # (Auto)


  


  


  


  0.0x10^3/uL


(0.0-0.2)


 


Sodium Level


  


  


  


  139mmol/L


(136-145)


 


Potassium Level


  


  


  


  4.1mmol/L


(3.5-5.1)


 


Chloride Level


  


  


  


  104mmol/L


()


 


Carbon Dioxide Level


  


  


  


  24mmol/L


(21-32)


 


Anion Gap    11 (6-14) 


 


Blood Urea Nitrogen    53mg/dL (7-20) 


 


Creatinine


  


  


  


  4.1mg/dL


(0.6-1.0)


 


Estimated GFR


(Cockcroft-Gault) 


  


  


  11.1 


 


 


Glucose Level


  


  


  


  220mg/dL


(70-99)


 


Calcium Level


  


  


  


  8.1mg/dL


(8.5-10.1)


 


Phosphorus Level


  


  


  


  4.0mg/dL


(2.6-4.7)


 


Albumin


  


  


  


  2.2g/dL


(3.4-5.0)














Test


  2/20/17


07:13 


  


  


 


 


Glucose (Fingerstick)


  197mg/dL


(70-99) 


  


  


 








Microbiology


2/10/17 Blood Culture - Final, Complete


          NO GROWTH AFTER 5 DAYS


2/12/17 Urine Culture - Final, Complete


          


2/12/17 Urine Culture Result 1 (SEAN) - Final, Complete


Medications





 Current Medications


Aspirin 324 mg 324 mg 1X  ONCE PO  Last administered on 2/7/17at 12:59;  Start 2 /7/17 at 12:45;  Stop 2/7/17 at 12:48;  Status DC


Heparin Sodium/ Dextrose 500 ml @  19 mls/hr CONT  PRN IV SEE I/O RECORD Last 

administered on 2/8/17at 14:45;  Start 2/7/17 at 12:45;  Stop 2/9/17 at 15:30;  

Status DC


Heparin Sodium (Porcine) 1,950 unit PRN Q6HRS  PRN IV FOR UFH LEVEL LESS THAN 

0.2 Last administered on 2/7/17at 13:01;  Start 2/7/17 at 12:45;  Stop 2/9/17 

at 15:30;  Status DC


Ondansetron HCl (Zofran) 4 mg PRN Q8HRS  PRN IV NAUSEA/VOMITING;  Start 2/7/17 

at 12:45;  Stop 2/8/17 at 12:44;  Status DC


Morphine Sulfate 4 mg PRN Q2HR  PRN IV PAIN Last administered on 2/7/17at 14:03

;  Start 2/7/17 at 12:45;  Stop 2/8/17 at 12:44;  Status DC


Nitroglycerin (Nitrostat) 0.4 mg PRN Q5MIN  PRN SL CHEST PAIN Last administered 

on 2/7/17at 14:03;  Start 2/7/17 at 12:45;  Stop 2/8/17 at 12:44;  Status DC


Amlodipine Besylate (Norvasc) 5 mg DAILYWLUN PO  Last administered on 2/8/17at 

08:32;  Start 2/7/17 at 14:00;  Stop 2/8/17 at 16:02;  Status DC


Aspirin (Ecotrin) 81 mg DAILYWBKFT PO  Last administered on 2/19/17at 09:21;  

Start 2/7/17 at 14:00


Atorvastatin Calcium (Lipitor) 80 mg QHS PO  Last administered on 2/19/17at 21:

04;  Start 2/7/17 at 21:00


Carvedilol (Coreg) 25 mg BIDWMEALS PO  Last administered on 2/19/17at 18:16;  

Start 2/7/17 at 17:00


Clopidogrel Bisulfate (Plavix) 75 mg DAILY PO  Last administered on 2/7/17at 14:

28;  Start 2/7/17 at 14:00;  Stop 2/8/17 at 08:41;  Status DC


Isosorbide Mononitrate (Imdur) 30 mg DAILY PO  Last administered on 2/19/17at 09

:22;  Start 2/7/17 at 14:00


Furosemide (Lasix) 40 mg 1X  ONCE IVP  Last administered on 2/7/17at 14:30;  

Start 2/7/17 at 14:00;  Stop 2/7/17 at 14:23;  Status DC


Furosemide 40 mg 40 mg 1X  ONCE IVP  Last administered on 2/7/17at 14:30;  

Start 2/7/17 at 14:30;  Stop 2/7/17 at 14:38;  Status DC


Nitroglycerin/ Dextrose (Nitroglycerin Drip) 250 ml @ 0 mls/hr CONT  PRN IV SEE 

I/O RECORD Last administered on 2/7/17at 15:23;  Start 2/7/17 at 14:30;  Stop 2/ 8/17 at 16:02;  Status DC


Acetaminophen (Tylenol) 325 mg PRN Q6HRS  PRN PO MILD PAIN / TEMP Last 

administered on 2/13/17at 21:59;  Start 2/7/17 at 15:00


Acetaminophen/ Hydrocodone Bitart (Lortab 5/325) 1 tab PRN Q6HRS  PRN PO 

MODERATE TO SEVERE PAIN Last administered on 2/15/17at 21:18;  Start 2/7/17 at 

15:00


Hydralazine HCl (Apresoline) 10 mg PRN Q4HRS  PRN IVP ELEVATED BP, SEE COMMENTS

;  Start 2/7/17 at 15:00


Ondansetron HCl (Zofran) 4 mg PRN Q8HRS  PRN IV NAUSEA/VOMITING;  Start 2/7/17 

at 15:00


Albuterol Sulfate (Ventolin Neb Soln) 2.5 mg PRN Q4HRS  PRN NEB SHORTNESS OF 

BREATH;  Start 2/7/17 at 15:00


Insulin Aspart (Novolog) 0-7 UNITS TIDWMEALS SQ  Last administered on 2/19/17at 

18:40;  Start 2/7/17 at 17:00


Dextrose 12.5 gm PRN Q15MIN  PRN IV SEE COMMENTS;  Start 2/7/17 at 15:00


Info 1 each 1 each PRN DAILY  PRN MC SEE COMMENTS Last administered on 2/9/17at 

09:38;  Start 2/7/17 at 17:00;  Stop 2/10/17 at 08:25;  Status DC


Magnesium Sulfate/ Dextrose (Magnesium Sulfate PREMIX 2GM) 50 ml @ 25 mls/hr 

PRN DAILY  PRN IV for Mag < 1.7 on am labs;  Start 2/8/17 at 11:15


Lidocaine/Sodium Bicarbonate (Buffered Lidocaine 1%) 3 ml 1X  ONCE IJ  Last 

administered on 2/8/17at 14:03;  Start 2/8/17 at 12:30;  Stop 2/8/17 at 12:34;  

Status DC


Heparin Sodium (Porcine) 2,400 unit 1X  ONCE INT CAT  Last administered on 2/8/ 17at 14:04;  Start 2/8/17 at 12:30;  Stop 2/8/17 at 12:34;  Status DC


Heparin Sodium/ Sodium Chloride 60 unit 1X  ONCE IV  Last administered on 2/8/ 17at 14:04;  Start 2/8/17 at 12:30;  Stop 2/8/17 at 12:34;  Status DC


Lisinopril (Prinivil) 20 mg DAILY PO  Last administered on 2/12/17at 09:22;  

Start 2/8/17 at 16:00;  Stop 2/13/17 at 10:31;  Status DC


Hydralazine HCl 50 mg 50 mg BID PO  Last administered on 2/12/17at 20:55;  

Start 2/8/17 at 21:00;  Stop 2/13/17 at 10:31;  Status DC


Sodium Chloride (Iv Sodium Chloride 0.9% 1000ml Bag) 1,000 ml @  1,000 mls/hr 

Q1H PRN IV hypotension;  Start 2/8/17 at 18:28;  Stop 2/9/17 at 00:27;  Status 

DC


Diphenhydramine HCl (Benadryl) 25 mg 1X PRN  PRN IV ITCHING;  Start 2/8/17 at 18

:30;  Stop 2/9/17 at 18:29;  Status DC


Diphenhydramine HCl (Benadryl) 25 mg 1X PRN  PRN IV ITCHING;  Start 2/8/17 at 18

:30;  Stop 2/9/17 at 18:29;  Status DC


Sodium Chloride (Normal Saline Flush) 10 ml 1X PRN  PRN IV AP catheter pack;  

Start 2/8/17 at 18:30;  Stop 2/9/17 at 18:29;  Status DC


Sodium Chloride 10 ml 10 ml 1X PRN  PRN IV  catheter pack;  Start 2/8/17 at 18

:30;  Stop 2/9/17 at 18:29;  Status DC


Sodium Chloride (Iv Sodium Chloride 0.9% 1000ml Bag) 1,000 ml @  400 mls/hr 

Q2H30M PRN IV PATENCY;  Start 2/8/17 at 18:28;  Stop 2/9/17 at 06:27;  Status DC


Info (PHARMACY MONITORING -- do not chart) 1 each PRN DAILY  PRN MC SEE COMMENTS

;  Start 2/8/17 at 18:30


Info (PHARMACY MONITORING -- do not chart) 1 each PRN DAILY  PRN MC SEE COMMENTS

;  Start 2/9/17 at 10:30;  Status UNV


Info (PHARMACY MONITORING -- do not chart) 1 each PRN DAILY  PRN MC SEE COMMENTS

;  Start 2/9/17 at 10:30;  Status Cancel


Darbepoetin Valeriano (Aranesp) 60 mcg WEEKLYHS SQ  Last administered on 2/16/17at 20

:53;  Start 2/9/17 at 21:00


Clopidogrel Bisulfate (Plavix) 75 mg DAILYWBKFT PO ;  Start 2/10/17 at 08:00;  

Stop 2/10/17 at 08:00;  Status DC


Clopidogrel Bisulfate 75 mg 75 mg DAILYWBKFT PO  Last administered on 2/10/17at 

08:25;  Start 2/9/17 at 15:30;  Stop 2/11/17 at 18:54;  Status DC


Sodium Chloride (Iv Sodium Chloride 0.9% 1000ml Bag) 1,000 ml @  1,000 mls/hr 

Q1H PRN IV hypotension;  Start 2/10/17 at 08:11;  Stop 2/10/17 at 15:00;  

Status DC


Diphenhydramine HCl (Benadryl) 25 mg 1X PRN  PRN IV ITCHING;  Start 2/10/17 at 

08:15;  Stop 2/10/17 at 15:00;  Status DC


Diphenhydramine HCl (Benadryl) 25 mg 1X PRN  PRN IV ITCHING;  Start 2/10/17 at 

08:15;  Stop 2/10/17 at 15:00;  Status DC


Sodium Chloride (Normal Saline Flush) 10 ml 1X PRN  PRN IV AP catheter pack;  

Start 2/10/17 at 08:15;  Stop 2/10/17 at 15:00;  Status DC


Sodium Chloride 10 ml 10 ml 1X PRN  PRN IV  catheter pack;  Start 2/10/17 at 

08:15;  Stop 2/10/17 at 15:00;  Status DC


Sodium Chloride (Iv Sodium Chloride 0.9% 1000ml Bag) 1,000 ml @  400 mls/hr 

Q2H30M PRN IV PATENCY;  Start 2/10/17 at 08:11;  Stop 2/10/17 at 21:00;  Status 

DC


Info 1 each 1 each PRN DAILY  PRN MC SEE COMMENTS;  Start 2/10/17 at 08:15;  

Status UNV


Magnesium Sulfate/ Dextrose 100 ml @  100 mls/hr 1X  ONCE IV  Last administered 

on 2/10/17at 12:36;  Start 2/10/17 at 10:30;  Stop 2/10/17 at 11:29;  Status DC


Ceftriaxone Sodium 1 gm/ Sodium Chloride 50 ml @  100 mls/hr Q24H IV  Last 

administered on 2/15/17at 17:35;  Start 2/12/17 at 16:00;  Stop 2/16/17 at 15:02

;  Status DC


Sodium Chloride 1,000 ml @  1,000 mls/hr Q1H PRN IV hypotension;  Start 2/13/17 

at 08:48;  Stop 2/13/17 at 14:47;  Status DC


Sodium Chloride (Iv Sodium Chloride 0.9% 1000ml Bag) 1,000 ml @  400 mls/hr 

Q2H30M PRN IV PATENCY;  Start 2/13/17 at 08:48;  Stop 2/13/17 at 20:47;  Status 

DC


Info (PHARMACY MONITORING -- do not chart) 1 each PRN DAILY  PRN MC SEE COMMENTS

;  Start 2/13/17 at 09:00;  Status UNV


Lisinopril (Prinivil) 40 mg DAILY PO  Last administered on 2/19/17at 09:23;  

Start 2/14/17 at 09:00


Cyanocobalamin (Vitamin B-12) 1,000 mcg DAILY PO  Last administered on 2/19/ 17at 09:21;  Start 2/13/17 at 15:30


Ferrous Sulfate (Feosol) 325 mg BID PO  Last administered on 2/19/17at 21:04;  

Start 2/13/17 at 21:00


Heparin Sodium (Porcine) 10,000 unit STK-MED ONCE .ROUTE ;  Start 2/14/17 at 17:

14;  Stop 2/14/17 at 17:15;  Status DC


Lidocaine/ Epinephrine 20 ml 20 ml STK-MED ONCE .ROUTE ;  Start 2/14/17 at 17:14

;  Stop 2/14/17 at 17:15;  Status DC


Heparin Sodium/ Sodium Chloride 500 ml @  As Directed STK-MED ONCE .ROUTE ;  

Start 2/14/17 at 17:14;  Stop 2/14/17 at 17:15;  Status DC


Fentanyl Citrate (Fentanyl 2ml Vial) 100 mcg STK-MED ONCE .ROUTE ;  Start 2/14/ 17 at 17:35;  Stop 2/14/17 at 17:36;  Status DC


Midazolam HCl 2 mg 2 mg STK-MED ONCE .ROUTE ;  Start 2/14/17 at 17:35;  Stop 2/ 14/17 at 17:36;  Status DC


Cefazolin Sodium (Ancef 1gm Ivpb For Omni) 50 ml @ As Directed STK-MED ONCE IV 

;  Start 2/14/17 at 17:36;  Stop 2/14/17 at 17:37;  Status DC


Heparin Sodium/ Sodium Chloride 1,000 unit 1X  ONCE IART  Last administered on 2 /14/17at 18:04;  Start 2/14/17 at 18:00;  Stop 2/14/17 at 18:02;  Status DC


Midazolam HCl (Versed) 2 mg 1X  ONCE IV  Last administered on 2/14/17at 18:04;  

Start 2/14/17 at 18:00;  Stop 2/14/17 at 18:02;  Status DC


Fentanyl Citrate (Fentanyl 2ml Vial) 100 mcg 1X  ONCE IV  Last administered on 2 /14/17at 18:04;  Start 2/14/17 at 18:00;  Stop 2/14/17 at 18:02;  Status DC


Heparin Sodium (Porcine) 4300 unit 4,300 unit 1X  ONCE IV  Last administered on 

2/14/17at 18:05;  Start 2/14/17 at 18:00;  Stop 2/14/17 at 18:02;  Status DC


Cefazolin Sodium (Ancef 1gm Ivpb For Omni) 50 ml @  100 mls/hr 1X  ONCE IV  

Last administered on 2/14/17at 18:06;  Start 2/14/17 at 18:00;  Stop 2/14/17 at 

18:29;  Status DC


Lidocaine/ Epinephrine 10 ml 10 ml 1X  ONCE IJ  Last administered on 2/14/17at 

18:05;  Start 2/14/17 at 18:00;  Stop 2/14/17 at 18:02;  Status DC


Sodium Chloride 1,000 ml @  1,000 mls/hr Q1H PRN IV hypotension;  Start 2/15/17 

at 14:32;  Stop 2/15/17 at 20:31;  Status DC


Albumin Human (Albuminar) 200 ml @  200 mls/hr 1X PRN  PRN IV Hypotension;  

Start 2/15/17 at 14:45;  Stop 2/15/17 at 20:44;  Status DC


Sodium Chloride (Normal Saline Flush) 10 ml 1X PRN  PRN IV AP catheter pack;  

Start 2/15/17 at 14:45;  Stop 2/16/17 at 14:44;  Status DC


Sodium Chloride 10 ml 10 ml 1X PRN  PRN IV  catheter pack;  Start 2/15/17 at 

14:45;  Stop 2/16/17 at 14:44;  Status DC


Sodium Chloride (Iv Sodium Chloride 0.9% 1000ml Bag) 1,000 ml @  400 mls/hr 

Q2H30M PRN IV PATENCY;  Start 2/15/17 at 14:32;  Stop 2/16/17 at 02:31;  Status 

DC


Glimepiride 1 mg 1 mg DAILY PO  Last administered on 2/18/17at 09:28;  Start 2/ 16/17 at 17:00;  Stop 2/18/17 at 13:46;  Status DC


Sodium Chloride (Iv Sodium Chloride 0.9% 1000ml Bag) 1,000 ml @  1,000 mls/hr 

Q1H PRN IV hypotension;  Start 2/17/17 at 09:18;  Stop 2/17/17 at 15:17;  

Status DC


Acetaminophen (Tylenol) 500 mg 1X PRN  PRN PO MILD PAIN / TEMP;  Start 2/17/17 

at 09:30;  Stop 2/18/17 at 09:29;  Status DC


Diphenhydramine HCl (Benadryl) 25 mg 1X PRN  PRN IV ITCHING;  Start 2/17/17 at 

09:30;  Stop 2/18/17 at 09:29;  Status DC


Diphenhydramine HCl (Benadryl) 25 mg 1X PRN  PRN IV ITCHING;  Start 2/17/17 at 

09:30;  Stop 2/18/17 at 09:29;  Status DC


Sodium Chloride (Normal Saline Flush) 10 ml 1X PRN  PRN IV AP catheter pack;  

Start 2/17/17 at 09:30;  Stop 2/18/17 at 09:29;  Status DC


Sodium Chloride (Normal Saline Flush) 10 ml 1X PRN  PRN IV  catheter pack;  

Start 2/17/17 at 09:30;  Stop 2/18/17 at 09:29;  Status DC


Labetalol HCl 10 mg 10 mg PRN Q1HR  PRN IVP SBP > 180;  Start 2/17/17 at 09:30;

  Stop 2/18/17 at 09:29;  Status DC


Sodium Chloride (Iv Sodium Chloride 0.9% 1000ml Bag) 1,000 ml @  400 mls/hr 

Q2H30M PRN IV PATENCY;  Start 2/17/17 at 09:18;  Stop 2/17/17 at 21:17;  Status 

DC


Info (PHARMACY MONITORING -- do not chart) 1 each PRN DAILY  PRN MC SEE COMMENTS

;  Start 2/17/17 at 09:30;  Status UNV


Amlodipine Besylate (Norvasc) 5 mg DAILY PO  Last administered on 2/19/17at 09:

22;  Start 2/18/17 at 12:00


Glimepiride (Amaryl) 2 mg DAILY PO  Last administered on 2/19/17at 09:21;  

Start 2/19/17 at 09:00


Insulin Detemir (Levemir) 10 units QHS SQ  Last administered on 2/19/17at 21:12

;  Start 2/19/17 at 21:00





Active Scripts


Active


Lisinopril 20 Mg Tablet 20 Mg PO QHS


Isosorbide Mononitrate Er (Isosorbide Mononitrate) 30 Mg Tab.er.24h 30 Mg PO 

DAILY


Hydralazine Hcl 50 Mg Tablet 100 Mg PO TID


Carvedilol 12.5 Mg Tablet 25 Mg PO BIDWMEALS


Atorvastatin Calcium 40 Mg Tablet 80 Mg PO QHS


Aspirin Ec (Aspirin) 81 Mg Tablet.dr 81 Mg PO DAILYWBKFT


Amlodipine Besylate 5 Mg Tablet 5 Mg PO DAILYWLUN


Reported


Clopidogrel (Clopidogrel Bisulfate) 75 Mg Tablet 75 Mg PO DAILY


Glyburide 5 Mg Tablet 1 Tab PO BIDWMEALS


Lisinopril 20 Mg Tablet 1 Tab PO DAILY


Vitals/I & O





 Vital Sign - Last 24 Hours








 2/19/17 2/19/17 2/19/17 2/19/17





 10:20 15:00 18:16 19:40


 


Temp 97.8 98.1  98.3





 97.8 98.1  98.3


 


Pulse 65 67 67 74


 


Resp 18 20 16


 


B/P 150/64 161/60 161/60 165/61


 


Pulse Ox 95 95  98


 


O2 Delivery Room Air Room Air  Room Air


 


    





    





 2/19/17 2/19/17 2/20/17 2/20/17





 20:00 23:00 03:00 07:00


 


Temp  98.1 98.4 98.1





  98.1 98.4 98.1


 


Pulse  67 69 68


 


Resp  14 16 17


 


B/P  165/60 171/63 155/58


 


Pulse Ox  95 97 98


 


O2 Delivery Room Air Room Air Room Air Room Air














 Intake and Output   


 


 2/19/17 2/19/17 2/20/17





 15:00 23:00 07:00


 


Intake Total   600 ml


 


Output Total  900 ml 251 ml


 


Balance  -900 ml 349 ml














OSMEL VALDEZ MD Feb 20, 2017 09:38

## 2017-02-21 NOTE — PDOC
PROGRESS NOTES


Chief Complaint


Chief Complaint


cc: sob 





A//P


1.  Acute on chronic systolic congestive heart failure. Stable


2.  Non-ST-segment elevation myocardial infarction.


3.  WALTER with CKD on HD


4.  Ischemic cardiomyopathy.


5.  Malignant hypertension, Better


6.  Anemia


7.  Hyperlipidemia.


8.  Psoriasis.


9. CAD


10. Anemia


11. UTI not POA


12. DM2





Plan 


HD per nephrology 


monitor hemoglobin 


BP stable 


SSI with Levemir 


SW to find place for out HD, d/w 


Labs reviewed.





History of Present Illness


History of Present Illness


doing better


no fever


no chills


no acute events





Vitals


Vitals





 Vital Signs








  Date Time  Temp Pulse Resp B/P Pulse Ox O2 Delivery O2 Flow Rate FiO2


 


2/21/17 09:57  68  169/74    


 


2/21/17 08:13      Room Air  


 


2/21/17 07:40 98.4  18  95   





 98.4       











Physical Exam


General:  Alert, Oriented X3


Heart:  Regular rate, Normal S1, Normal S2, No murmurs, Gallops


Lungs:  Clear, Other


Abdomen:  Normal bowel sounds, Soft, No tenderness, No hepatosplenomegaly, No 

masses


Extremities:  No clubbing, No cyanosis, No edema, Normal pulses, No tenderness/

swelling


Skin:  No breakdown, No significant lesion, Other (psoriasis )





Labs


LABS





Laboratory Tests








Test


  2/20/17


11:38 2/20/17


13:08 2/20/17


17:31 2/20/17


20:32


 


Glucose (Fingerstick)


  268mg/dL


(70-99) 235mg/dL


(70-99) 165mg/dL


(70-99) 311mg/dL


(70-99)














Test


  2/21/17


08:11 


  


  


 


 


Glucose (Fingerstick)


  178mg/dL


(70-99) 


  


  


 











Assessment and Plan


Assessmemt and Plan


 Problems


Medical Problems:


(1) Congestive heart failure


Status: Acute  





(2) NSTEMI (non-ST elevated myocardial infarction)


Status: Acute  








Problems:  





Comment


Review of Relevant


I have reviewed the following items lore (where applicable) has been applied.


Labs





Laboratory Tests








Test


  2/19/17


11:52 2/19/17


16:43 2/19/17


20:15 2/20/17


05:45


 


Glucose (Fingerstick)


  267mg/dL


(70-99) 227mg/dL


(70-99) 330mg/dL


(70-99) 


 


 


White Blood Count


  


  


  


  6.9x10^3/uL


(4.0-11.0)


 


Red Blood Count


  


  


  


  2.31x10^6/uL


(3.50-5.40)


 


Hemoglobin


  


  


  


  7.1g/dL


(12.0-15.5)


 


Hematocrit


  


  


  


  21.6%


(36.0-47.0)


 


Mean Corpuscular Volume    94fL () 


 


Mean Corpuscular Hemoglobin    31pg (25-35) 


 


Mean Corpuscular Hemoglobin


Concent 


  


  


  33g/dL (31-37) 


 


 


Red Cell Distribution Width


  


  


  


  14.8%


(11.5-14.5)


 


Platelet Count


  


  


  


  199x10^3/uL


(140-400)


 


Neutrophils (%) (Auto)    61% (31-73) 


 


Lymphocytes (%) (Auto)    26% (24-48) 


 


Monocytes (%) (Auto)    9% (0-9) 


 


Eosinophils (%) (Auto)    4% (0-3) 


 


Basophils (%) (Auto)    1% (0-3) 


 


Neutrophils # (Auto)


  


  


  


  4.2x10^3uL


(1.8-7.7)


 


Lymphocytes # (Auto)


  


  


  


  1.8x10^3/uL


(1.0-4.8)


 


Monocytes # (Auto)


  


  


  


  0.6x10^3/uL


(0.0-1.1)


 


Eosinophils # (Auto)


  


  


  


  0.3x10^3/uL


(0.0-0.7)


 


Basophils # (Auto)


  


  


  


  0.0x10^3/uL


(0.0-0.2)


 


Sodium Level


  


  


  


  139mmol/L


(136-145)


 


Potassium Level


  


  


  


  4.1mmol/L


(3.5-5.1)


 


Chloride Level


  


  


  


  104mmol/L


()


 


Carbon Dioxide Level


  


  


  


  24mmol/L


(21-32)


 


Anion Gap    11 (6-14) 


 


Blood Urea Nitrogen    53mg/dL (7-20) 


 


Creatinine


  


  


  


  4.1mg/dL


(0.6-1.0)


 


Estimated GFR


(Cockcroft-Gault) 


  


  


  11.1 


 


 


Glucose Level


  


  


  


  220mg/dL


(70-99)


 


Calcium Level


  


  


  


  8.1mg/dL


(8.5-10.1)


 


Phosphorus Level


  


  


  


  4.0mg/dL


(2.6-4.7)


 


Albumin


  


  


  


  2.2g/dL


(3.4-5.0)














Test


  2/20/17


07:13 2/20/17


11:38 2/20/17


13:08 2/20/17


17:31


 


Glucose (Fingerstick)


  197mg/dL


(70-99) 268mg/dL


(70-99) 235mg/dL


(70-99) 165mg/dL


(70-99)














Test


  2/20/17


20:32 2/21/17


08:11 


  


 


 


Glucose (Fingerstick)


  311mg/dL


(70-99) 178mg/dL


(70-99) 


  


 








Laboratory Tests








Test


  2/20/17


11:38 2/20/17


13:08 2/20/17


17:31 2/20/17


20:32


 


Glucose (Fingerstick)


  268mg/dL


(70-99) 235mg/dL


(70-99) 165mg/dL


(70-99) 311mg/dL


(70-99)














Test


  2/21/17


08:11 


  


  


 


 


Glucose (Fingerstick)


  178mg/dL


(70-99) 


  


  


 








Microbiology


2/10/17 Blood Culture - Final, Complete


          NO GROWTH AFTER 5 DAYS


2/12/17 Urine Culture - Final, Complete


          


2/12/17 Urine Culture Result 1 (SEAN) - Final, Complete


Medications





 Current Medications


Aspirin 324 mg 324 mg 1X  ONCE PO  Last administered on 2/7/17at 12:59;  Start 2 /7/17 at 12:45;  Stop 2/7/17 at 12:48;  Status DC


Heparin Sodium/ Dextrose 500 ml @  19 mls/hr CONT  PRN IV SEE I/O RECORD Last 

administered on 2/8/17at 14:45;  Start 2/7/17 at 12:45;  Stop 2/9/17 at 15:30;  

Status DC


Heparin Sodium (Porcine) 1,950 unit PRN Q6HRS  PRN IV FOR UFH LEVEL LESS THAN 

0.2 Last administered on 2/7/17at 13:01;  Start 2/7/17 at 12:45;  Stop 2/9/17 

at 15:30;  Status DC


Ondansetron HCl (Zofran) 4 mg PRN Q8HRS  PRN IV NAUSEA/VOMITING;  Start 2/7/17 

at 12:45;  Stop 2/8/17 at 12:44;  Status DC


Morphine Sulfate 4 mg PRN Q2HR  PRN IV PAIN Last administered on 2/7/17at 14:03

;  Start 2/7/17 at 12:45;  Stop 2/8/17 at 12:44;  Status DC


Nitroglycerin (Nitrostat) 0.4 mg PRN Q5MIN  PRN SL CHEST PAIN Last administered 

on 2/7/17at 14:03;  Start 2/7/17 at 12:45;  Stop 2/8/17 at 12:44;  Status DC


Amlodipine Besylate (Norvasc) 5 mg DAILYWLUN PO  Last administered on 2/8/17at 

08:32;  Start 2/7/17 at 14:00;  Stop 2/8/17 at 16:02;  Status DC


Aspirin (Ecotrin) 81 mg DAILYWBKFT PO  Last administered on 2/21/17at 09:51;  

Start 2/7/17 at 14:00


Atorvastatin Calcium (Lipitor) 80 mg QHS PO  Last administered on 2/20/17at 20:

52;  Start 2/7/17 at 21:00


Carvedilol (Coreg) 25 mg BIDWMEALS PO  Last administered on 2/21/17at 09:57;  

Start 2/7/17 at 17:00


Clopidogrel Bisulfate (Plavix) 75 mg DAILY PO  Last administered on 2/7/17at 14:

28;  Start 2/7/17 at 14:00;  Stop 2/8/17 at 08:41;  Status DC


Isosorbide Mononitrate (Imdur) 30 mg DAILY PO  Last administered on 2/21/17at 09

:56;  Start 2/7/17 at 14:00


Furosemide (Lasix) 40 mg 1X  ONCE IVP  Last administered on 2/7/17at 14:30;  

Start 2/7/17 at 14:00;  Stop 2/7/17 at 14:23;  Status DC


Furosemide 40 mg 40 mg 1X  ONCE IVP  Last administered on 2/7/17at 14:30;  

Start 2/7/17 at 14:30;  Stop 2/7/17 at 14:38;  Status DC


Nitroglycerin/ Dextrose (Nitroglycerin Drip) 250 ml @ 0 mls/hr CONT  PRN IV SEE 

I/O RECORD Last administered on 2/7/17at 15:23;  Start 2/7/17 at 14:30;  Stop 2/ 8/17 at 16:02;  Status DC


Acetaminophen (Tylenol) 325 mg PRN Q6HRS  PRN PO MILD PAIN / TEMP Last 

administered on 2/13/17at 21:59;  Start 2/7/17 at 15:00


Acetaminophen/ Hydrocodone Bitart (Lortab 5/325) 1 tab PRN Q6HRS  PRN PO 

MODERATE TO SEVERE PAIN Last administered on 2/15/17at 21:18;  Start 2/7/17 at 

15:00


Hydralazine HCl (Apresoline) 10 mg PRN Q4HRS  PRN IVP ELEVATED BP, SEE COMMENTS 

Last administered on 2/20/17at 11:16;  Start 2/7/17 at 15:00


Ondansetron HCl (Zofran) 4 mg PRN Q8HRS  PRN IV NAUSEA/VOMITING;  Start 2/7/17 

at 15:00


Albuterol Sulfate (Ventolin Neb Soln) 2.5 mg PRN Q4HRS  PRN NEB SHORTNESS OF 

BREATH;  Start 2/7/17 at 15:00


Insulin Aspart (Novolog) 0-7 UNITS TIDWMEALS SQ  Last administered on 2/21/17at 

10:06;  Start 2/7/17 at 17:00


Dextrose 12.5 gm PRN Q15MIN  PRN IV SEE COMMENTS;  Start 2/7/17 at 15:00


Info 1 each 1 each PRN DAILY  PRN MC SEE COMMENTS Last administered on 2/9/17at 

09:38;  Start 2/7/17 at 17:00;  Stop 2/10/17 at 08:25;  Status DC


Magnesium Sulfate/ Dextrose (Magnesium Sulfate PREMIX 2GM) 50 ml @ 25 mls/hr 

PRN DAILY  PRN IV for Mag < 1.7 on am labs;  Start 2/8/17 at 11:15


Lidocaine/Sodium Bicarbonate (Buffered Lidocaine 1%) 3 ml 1X  ONCE IJ  Last 

administered on 2/8/17at 14:03;  Start 2/8/17 at 12:30;  Stop 2/8/17 at 12:34;  

Status DC


Heparin Sodium (Porcine) 2,400 unit 1X  ONCE INT CAT  Last administered on 2/8/ 17at 14:04;  Start 2/8/17 at 12:30;  Stop 2/8/17 at 12:34;  Status DC


Heparin Sodium/ Sodium Chloride 60 unit 1X  ONCE IV  Last administered on 2/8/ 17at 14:04;  Start 2/8/17 at 12:30;  Stop 2/8/17 at 12:34;  Status DC


Lisinopril (Prinivil) 20 mg DAILY PO  Last administered on 2/12/17at 09:22;  

Start 2/8/17 at 16:00;  Stop 2/13/17 at 10:31;  Status DC


Hydralazine HCl 50 mg 50 mg BID PO  Last administered on 2/12/17at 20:55;  

Start 2/8/17 at 21:00;  Stop 2/13/17 at 10:31;  Status DC


Sodium Chloride (Iv Sodium Chloride 0.9% 1000ml Bag) 1,000 ml @  1,000 mls/hr 

Q1H PRN IV hypotension;  Start 2/8/17 at 18:28;  Stop 2/9/17 at 00:27;  Status 

DC


Diphenhydramine HCl (Benadryl) 25 mg 1X PRN  PRN IV ITCHING;  Start 2/8/17 at 18

:30;  Stop 2/9/17 at 18:29;  Status DC


Diphenhydramine HCl (Benadryl) 25 mg 1X PRN  PRN IV ITCHING;  Start 2/8/17 at 18

:30;  Stop 2/9/17 at 18:29;  Status DC


Sodium Chloride (Normal Saline Flush) 10 ml 1X PRN  PRN IV AP catheter pack;  

Start 2/8/17 at 18:30;  Stop 2/9/17 at 18:29;  Status DC


Sodium Chloride 10 ml 10 ml 1X PRN  PRN IV  catheter pack;  Start 2/8/17 at 18

:30;  Stop 2/9/17 at 18:29;  Status DC


Sodium Chloride (Iv Sodium Chloride 0.9% 1000ml Bag) 1,000 ml @  400 mls/hr 

Q2H30M PRN IV PATENCY;  Start 2/8/17 at 18:28;  Stop 2/9/17 at 06:27;  Status DC


Info (PHARMACY MONITORING -- do not chart) 1 each PRN DAILY  PRN MC SEE COMMENTS

;  Start 2/8/17 at 18:30


Info (PHARMACY MONITORING -- do not chart) 1 each PRN DAILY  PRN MC SEE COMMENTS

;  Start 2/9/17 at 10:30;  Status UNV


Info (PHARMACY MONITORING -- do not chart) 1 each PRN DAILY  PRN MC SEE COMMENTS

;  Start 2/9/17 at 10:30;  Status Cancel


Darbepoetin Valeriano (Aranesp) 60 mcg WEEKLYHS SQ  Last administered on 2/16/17at 20

:53;  Start 2/9/17 at 21:00


Clopidogrel Bisulfate (Plavix) 75 mg DAILYWBKFT PO ;  Start 2/10/17 at 08:00;  

Stop 2/10/17 at 08:00;  Status DC


Clopidogrel Bisulfate 75 mg 75 mg DAILYWBKFT PO  Last administered on 2/10/17at 

08:25;  Start 2/9/17 at 15:30;  Stop 2/11/17 at 18:54;  Status DC


Sodium Chloride (Iv Sodium Chloride 0.9% 1000ml Bag) 1,000 ml @  1,000 mls/hr 

Q1H PRN IV hypotension;  Start 2/10/17 at 08:11;  Stop 2/10/17 at 15:00;  

Status DC


Diphenhydramine HCl (Benadryl) 25 mg 1X PRN  PRN IV ITCHING;  Start 2/10/17 at 

08:15;  Stop 2/10/17 at 15:00;  Status DC


Diphenhydramine HCl (Benadryl) 25 mg 1X PRN  PRN IV ITCHING;  Start 2/10/17 at 

08:15;  Stop 2/10/17 at 15:00;  Status DC


Sodium Chloride (Normal Saline Flush) 10 ml 1X PRN  PRN IV AP catheter pack;  

Start 2/10/17 at 08:15;  Stop 2/10/17 at 15:00;  Status DC


Sodium Chloride 10 ml 10 ml 1X PRN  PRN IV  catheter pack;  Start 2/10/17 at 

08:15;  Stop 2/10/17 at 15:00;  Status DC


Sodium Chloride (Iv Sodium Chloride 0.9% 1000ml Bag) 1,000 ml @  400 mls/hr 

Q2H30M PRN IV PATENCY;  Start 2/10/17 at 08:11;  Stop 2/10/17 at 21:00;  Status 

DC


Info 1 each 1 each PRN DAILY  PRN MC SEE COMMENTS;  Start 2/10/17 at 08:15;  

Status UNV


Magnesium Sulfate/ Dextrose 100 ml @  100 mls/hr 1X  ONCE IV  Last administered 

on 2/10/17at 12:36;  Start 2/10/17 at 10:30;  Stop 2/10/17 at 11:29;  Status DC


Ceftriaxone Sodium 1 gm/ Sodium Chloride 50 ml @  100 mls/hr Q24H IV  Last 

administered on 2/15/17at 17:35;  Start 2/12/17 at 16:00;  Stop 2/16/17 at 15:02

;  Status DC


Sodium Chloride 1,000 ml @  1,000 mls/hr Q1H PRN IV hypotension;  Start 2/13/17 

at 08:48;  Stop 2/13/17 at 14:47;  Status DC


Sodium Chloride (Iv Sodium Chloride 0.9% 1000ml Bag) 1,000 ml @  400 mls/hr 

Q2H30M PRN IV PATENCY;  Start 2/13/17 at 08:48;  Stop 2/13/17 at 20:47;  Status 

DC


Info (PHARMACY MONITORING -- do not chart) 1 each PRN DAILY  PRN MC SEE COMMENTS

;  Start 2/13/17 at 09:00;  Status UNV


Lisinopril (Prinivil) 40 mg DAILY PO  Last administered on 2/21/17at 09:55;  

Start 2/14/17 at 09:00


Cyanocobalamin (Vitamin B-12) 1,000 mcg DAILY PO  Last administered on 2/21/ 17at 09:53;  Start 2/13/17 at 15:30


Ferrous Sulfate (Feosol) 325 mg BID PO  Last administered on 2/21/17at 09:58;  

Start 2/13/17 at 21:00


Heparin Sodium (Porcine) 10,000 unit STK-MED ONCE .ROUTE ;  Start 2/14/17 at 17:

14;  Stop 2/14/17 at 17:15;  Status DC


Lidocaine/ Epinephrine 20 ml 20 ml STK-MED ONCE .ROUTE ;  Start 2/14/17 at 17:14

;  Stop 2/14/17 at 17:15;  Status DC


Heparin Sodium/ Sodium Chloride 500 ml @  As Directed STK-MED ONCE .ROUTE ;  

Start 2/14/17 at 17:14;  Stop 2/14/17 at 17:15;  Status DC


Fentanyl Citrate (Fentanyl 2ml Vial) 100 mcg STK-MED ONCE .ROUTE ;  Start 2/14/ 17 at 17:35;  Stop 2/14/17 at 17:36;  Status DC


Midazolam HCl 2 mg 2 mg STK-MED ONCE .ROUTE ;  Start 2/14/17 at 17:35;  Stop 2/ 14/17 at 17:36;  Status DC


Cefazolin Sodium (Ancef 1gm Ivpb For Omni) 50 ml @ As Directed STK-MED ONCE IV 

;  Start 2/14/17 at 17:36;  Stop 2/14/17 at 17:37;  Status DC


Heparin Sodium/ Sodium Chloride 1,000 unit 1X  ONCE IART  Last administered on 2 /14/17at 18:04;  Start 2/14/17 at 18:00;  Stop 2/14/17 at 18:02;  Status DC


Midazolam HCl (Versed) 2 mg 1X  ONCE IV  Last administered on 2/14/17at 18:04;  

Start 2/14/17 at 18:00;  Stop 2/14/17 at 18:02;  Status DC


Fentanyl Citrate (Fentanyl 2ml Vial) 100 mcg 1X  ONCE IV  Last administered on 2 /14/17at 18:04;  Start 2/14/17 at 18:00;  Stop 2/14/17 at 18:02;  Status DC


Heparin Sodium (Porcine) 4300 unit 4,300 unit 1X  ONCE IV  Last administered on 

2/14/17at 18:05;  Start 2/14/17 at 18:00;  Stop 2/14/17 at 18:02;  Status DC


Cefazolin Sodium (Ancef 1gm Ivpb For Omni) 50 ml @  100 mls/hr 1X  ONCE IV  

Last administered on 2/14/17at 18:06;  Start 2/14/17 at 18:00;  Stop 2/14/17 at 

18:29;  Status DC


Lidocaine/ Epinephrine 10 ml 10 ml 1X  ONCE IJ  Last administered on 2/14/17at 

18:05;  Start 2/14/17 at 18:00;  Stop 2/14/17 at 18:02;  Status DC


Sodium Chloride 1,000 ml @  1,000 mls/hr Q1H PRN IV hypotension;  Start 2/15/17 

at 14:32;  Stop 2/15/17 at 20:31;  Status DC


Albumin Human (Albuminar) 200 ml @  200 mls/hr 1X PRN  PRN IV Hypotension;  

Start 2/15/17 at 14:45;  Stop 2/15/17 at 20:44;  Status DC


Sodium Chloride (Normal Saline Flush) 10 ml 1X PRN  PRN IV AP catheter pack;  

Start 2/15/17 at 14:45;  Stop 2/16/17 at 14:44;  Status DC


Sodium Chloride 10 ml 10 ml 1X PRN  PRN IV  catheter pack;  Start 2/15/17 at 

14:45;  Stop 2/16/17 at 14:44;  Status DC


Sodium Chloride (Iv Sodium Chloride 0.9% 1000ml Bag) 1,000 ml @  400 mls/hr 

Q2H30M PRN IV PATENCY;  Start 2/15/17 at 14:32;  Stop 2/16/17 at 02:31;  Status 

DC


Glimepiride 1 mg 1 mg DAILY PO  Last administered on 2/18/17at 09:28;  Start 2/ 16/17 at 17:00;  Stop 2/18/17 at 13:46;  Status DC


Sodium Chloride (Iv Sodium Chloride 0.9% 1000ml Bag) 1,000 ml @  1,000 mls/hr 

Q1H PRN IV hypotension;  Start 2/17/17 at 09:18;  Stop 2/17/17 at 15:17;  

Status DC


Acetaminophen (Tylenol) 500 mg 1X PRN  PRN PO MILD PAIN / TEMP;  Start 2/17/17 

at 09:30;  Stop 2/18/17 at 09:29;  Status DC


Diphenhydramine HCl (Benadryl) 25 mg 1X PRN  PRN IV ITCHING;  Start 2/17/17 at 

09:30;  Stop 2/18/17 at 09:29;  Status DC


Diphenhydramine HCl (Benadryl) 25 mg 1X PRN  PRN IV ITCHING;  Start 2/17/17 at 

09:30;  Stop 2/18/17 at 09:29;  Status DC


Sodium Chloride (Normal Saline Flush) 10 ml 1X PRN  PRN IV AP catheter pack;  

Start 2/17/17 at 09:30;  Stop 2/18/17 at 09:29;  Status DC


Sodium Chloride (Normal Saline Flush) 10 ml 1X PRN  PRN IV  catheter pack;  

Start 2/17/17 at 09:30;  Stop 2/18/17 at 09:29;  Status DC


Labetalol HCl 10 mg 10 mg PRN Q1HR  PRN IVP SBP > 180;  Start 2/17/17 at 09:30;

  Stop 2/18/17 at 09:29;  Status DC


Sodium Chloride (Iv Sodium Chloride 0.9% 1000ml Bag) 1,000 ml @  400 mls/hr 

Q2H30M PRN IV PATENCY;  Start 2/17/17 at 09:18;  Stop 2/17/17 at 21:17;  Status 

DC


Info (PHARMACY MONITORING -- do not chart) 1 each PRN DAILY  PRN MC SEE COMMENTS

;  Start 2/17/17 at 09:30;  Status UNV


Amlodipine Besylate (Norvasc) 5 mg DAILY PO  Last administered on 2/21/17at 09:

54;  Start 2/18/17 at 12:00


Glimepiride (Amaryl) 2 mg DAILY PO  Last administered on 2/21/17at 09:52;  

Start 2/19/17 at 09:00


Insulin Detemir 10 units 10 units QHS SQ  Last administered on 2/20/17at 20:56;

  Start 2/19/17 at 21:00


Sodium Chloride (Iv Sodium Chloride 0.9% 1000ml Bag) 1,000 ml @  1,000 mls/hr 

Q1H PRN IV hypotension;  Start 2/20/17 at 13:02;  Stop 2/20/17 at 19:01;  

Status DC


Sodium Chloride (Normal Saline Flush) 10 ml 1X PRN  PRN IV AP catheter pack;  

Start 2/20/17 at 13:15;  Stop 2/21/17 at 13:14


Sodium Chloride (Normal Saline Flush) 10 ml 1X PRN  PRN IV  catheter pack;  

Start 2/20/17 at 13:15;  Stop 2/21/17 at 13:14


Info (PHARMACY MONITORING -- do not chart) 1 each PRN DAILY  PRN MC SEE COMMENTS

;  Start 2/20/17 at 13:15;  Status UNV


Info (PHARMACY MONITORING -- do not chart) 1 each PRN DAILY  PRN MC SEE COMMENTS

;  Start 2/20/17 at 13:15;  Status UNV





Active Scripts


Active


Lisinopril 20 Mg Tablet 20 Mg PO QHS


Isosorbide Mononitrate Er (Isosorbide Mononitrate) 30 Mg Tab.er.24h 30 Mg PO 

DAILY


Hydralazine Hcl 50 Mg Tablet 100 Mg PO TID


Carvedilol 12.5 Mg Tablet 25 Mg PO BIDWMEALS


Atorvastatin Calcium 40 Mg Tablet 80 Mg PO QHS


Aspirin Ec (Aspirin) 81 Mg Tablet.dr 81 Mg PO DAILYWBKFT


Amlodipine Besylate 5 Mg Tablet 5 Mg PO DAILYWLUN


Reported


Clopidogrel (Clopidogrel Bisulfate) 75 Mg Tablet 75 Mg PO DAILY


Glyburide 5 Mg Tablet 1 Tab PO BIDWMEALS


Lisinopril 20 Mg Tablet 1 Tab PO DAILY


Vitals/I & O





 Vital Sign - Last 24 Hours








 2/20/17 2/20/17 2/20/17 2/20/17





 11:00 11:14 11:16 17:34


 


Temp 97.9   





 97.9   


 


Pulse 70 70 70 70


 


Resp 18   


 


B/P 185/79 185/79 185/79 176/75


 


Pulse Ox 97   


 


O2 Delivery Room Air   


 


    





    





 2/20/17 2/20/17 2/20/17 2/20/17





 17:34 17:35 17:36 19:50


 


Temp    98.7





    98.7


 


Pulse 70 70 70 79


 


Resp    18


 


B/P 175/75 175/75 175/75 172/61


 


Pulse Ox    96


 


O2 Delivery    Room Air


 


    





    





 2/20/17 2/20/17 2/21/17 2/21/17





 20:00 22:55 03:00 07:40


 


Temp  98.6 98.5 98.4





  98.6 98.5 98.4


 


Pulse  76 70 68


 


Resp  18 18 18


 


B/P  188/69 154/61 156/62


 


Pulse Ox  98 98 95


 


O2 Delivery Room Air Room Air Room Air Room Air


 


    





    





 2/21/17 2/21/17 2/21/17 2/21/17





 08:13 09:54 09:55 09:56


 


Pulse  68 68 68


 


B/P  169/74 169/74 169/74


 


O2 Delivery Room Air   





 2/21/17   





 09:57   


 


Pulse 68   


 


B/P 169/74   














 Intake and Output   


 


 2/20/17 2/20/17 2/21/17





 15:00 23:00 07:00


 


Intake Total  580 ml 840 ml


 


Output Total  1200 ml 200 ml


 


Balance  -620 ml 640 ml














OSMEL VALDEZ MD Feb 21, 2017 10:20

## 2017-02-22 NOTE — PDOC
PROGRESS NOTES


Chief Complaint


Chief Complaint


cc: sob 





A//P


1.  Acute on chronic systolic congestive heart failure. Stable


2.  Non-ST-segment elevation myocardial infarction.


3.  WALTER with CKD on HD


4.  Ischemic cardiomyopathy.


5.  Malignant hypertension, Better


6.  Anemia


7.  Hyperlipidemia.


8.  Psoriasis.


9. CAD


10. Anemia


11. UTI not POA


12. DM2





Plan 


HD per nephrology 


monitor hemoglobin 


BP stable 


SSI with Levemir 


SW to find place for out HD, 


Labs reviewed.





History of Present Illness


History of Present Illness


doing better


no fever


no chills


no acute events





Vitals


Vitals





 Vital Signs








  Date Time  Temp Pulse Resp B/P Pulse Ox O2 Delivery O2 Flow Rate FiO2


 


2/22/17 08:00      Room Air 2.0 


 


2/22/17 07:00 98.4 63 18 174/64 94   





 98.4       











Physical Exam


General:  Alert, Oriented X3


Heart:  Regular rate, Normal S1, Normal S2, No murmurs, Gallops


Lungs:  Clear, Other


Abdomen:  Normal bowel sounds, Soft, No tenderness, No hepatosplenomegaly, No 

masses


Extremities:  No clubbing, No cyanosis, No edema, Normal pulses, No tenderness/

swelling


Skin:  No breakdown, No significant lesion, Other (psoriasis )





Labs


LABS





Laboratory Tests








Test


  2/21/17


10:45 2/21/17


16:44 2/21/17


20:35 2/22/17


08:20


 


Glucose (Fingerstick)


  310mg/dL


(70-99) 264mg/dL


(70-99) 276mg/dL


(70-99) 


 


 


Sodium Level


  


  


  


  142mmol/L


(136-145)


 


Potassium Level


  


  


  


  4.3mmol/L


(3.5-5.1)


 


Chloride Level


  


  


  


  105mmol/L


()


 


Carbon Dioxide Level


  


  


  


  28mmol/L


(21-32)


 


Anion Gap    9 (6-14) 


 


Blood Urea Nitrogen    37mg/dL (7-20) 


 


Creatinine


  


  


  


  3.2mg/dL


(0.6-1.0)


 


Estimated GFR


(Cockcroft-Gault) 


  


  


  14.8 


 


 


Glucose Level


  


  


  


  165mg/dL


(70-99)


 


Calcium Level


  


  


  


  8.0mg/dL


(8.5-10.1)











Assessment and Plan


Assessmemt and Plan


 Problems


Medical Problems:


(1) Congestive heart failure


Status: Acute  





(2) NSTEMI (non-ST elevated myocardial infarction)


Status: Acute  








Problems:  





Comment


Review of Relevant


I have reviewed the following items lore (where applicable) has been applied.


Labs





Laboratory Tests








Test


  2/20/17


11:38 2/20/17


13:08 2/20/17


17:31 2/20/17


20:32


 


Glucose (Fingerstick)


  268mg/dL


(70-99) 235mg/dL


(70-99) 165mg/dL


(70-99) 311mg/dL


(70-99)














Test


  2/21/17


08:11 2/21/17


10:45 2/21/17


16:44 2/21/17


20:35


 


Glucose (Fingerstick)


  178mg/dL


(70-99) 310mg/dL


(70-99) 264mg/dL


(70-99) 276mg/dL


(70-99)














Test


  2/22/17


08:20 


  


  


 


 


Sodium Level


  142mmol/L


(136-145) 


  


  


 


 


Potassium Level


  4.3mmol/L


(3.5-5.1) 


  


  


 


 


Chloride Level


  105mmol/L


() 


  


  


 


 


Carbon Dioxide Level


  28mmol/L


(21-32) 


  


  


 


 


Anion Gap 9 (6-14)    


 


Blood Urea Nitrogen 37mg/dL (7-20)    


 


Creatinine


  3.2mg/dL


(0.6-1.0) 


  


  


 


 


Estimated GFR


(Cockcroft-Gault) 14.8 


  


  


  


 


 


Glucose Level


  165mg/dL


(70-99) 


  


  


 


 


Calcium Level


  8.0mg/dL


(8.5-10.1) 


  


  


 








Laboratory Tests








Test


  2/21/17


10:45 2/21/17


16:44 2/21/17


20:35 2/22/17


08:20


 


Glucose (Fingerstick)


  310mg/dL


(70-99) 264mg/dL


(70-99) 276mg/dL


(70-99) 


 


 


Sodium Level


  


  


  


  142mmol/L


(136-145)


 


Potassium Level


  


  


  


  4.3mmol/L


(3.5-5.1)


 


Chloride Level


  


  


  


  105mmol/L


()


 


Carbon Dioxide Level


  


  


  


  28mmol/L


(21-32)


 


Anion Gap    9 (6-14) 


 


Blood Urea Nitrogen    37mg/dL (7-20) 


 


Creatinine


  


  


  


  3.2mg/dL


(0.6-1.0)


 


Estimated GFR


(Cockcroft-Gault) 


  


  


  14.8 


 


 


Glucose Level


  


  


  


  165mg/dL


(70-99)


 


Calcium Level


  


  


  


  8.0mg/dL


(8.5-10.1)








Microbiology


2/10/17 Blood Culture - Final, Complete


          NO GROWTH AFTER 5 DAYS


2/12/17 Urine Culture - Final, Complete


          


2/12/17 Urine Culture Result 1 (SEAN) - Final, Complete


Medications





 Current Medications


Aspirin 324 mg 324 mg 1X  ONCE PO  Last administered on 2/7/17at 12:59;  Start 2 /7/17 at 12:45;  Stop 2/7/17 at 12:48;  Status DC


Heparin Sodium/ Dextrose 500 ml @  19 mls/hr CONT  PRN IV SEE I/O RECORD Last 

administered on 2/8/17at 14:45;  Start 2/7/17 at 12:45;  Stop 2/9/17 at 15:30;  

Status DC


Heparin Sodium (Porcine) 1,950 unit PRN Q6HRS  PRN IV FOR UFH LEVEL LESS THAN 

0.2 Last administered on 2/7/17at 13:01;  Start 2/7/17 at 12:45;  Stop 2/9/17 

at 15:30;  Status DC


Ondansetron HCl (Zofran) 4 mg PRN Q8HRS  PRN IV NAUSEA/VOMITING;  Start 2/7/17 

at 12:45;  Stop 2/8/17 at 12:44;  Status DC


Morphine Sulfate 4 mg PRN Q2HR  PRN IV PAIN Last administered on 2/7/17at 14:03

;  Start 2/7/17 at 12:45;  Stop 2/8/17 at 12:44;  Status DC


Nitroglycerin (Nitrostat) 0.4 mg PRN Q5MIN  PRN SL CHEST PAIN Last administered 

on 2/7/17at 14:03;  Start 2/7/17 at 12:45;  Stop 2/8/17 at 12:44;  Status DC


Amlodipine Besylate (Norvasc) 5 mg DAILYWLUN PO  Last administered on 2/8/17at 

08:32;  Start 2/7/17 at 14:00;  Stop 2/8/17 at 16:02;  Status DC


Aspirin (Ecotrin) 81 mg DAILYWBKFT PO  Last administered on 2/21/17at 09:51;  

Start 2/7/17 at 14:00


Atorvastatin Calcium (Lipitor) 80 mg QHS PO  Last administered on 2/21/17at 21:

41;  Start 2/7/17 at 21:00


Carvedilol (Coreg) 25 mg BIDWMEALS PO  Last administered on 2/21/17at 17:09;  

Start 2/7/17 at 17:00


Clopidogrel Bisulfate (Plavix) 75 mg DAILY PO  Last administered on 2/7/17at 14:

28;  Start 2/7/17 at 14:00;  Stop 2/8/17 at 08:41;  Status DC


Isosorbide Mononitrate (Imdur) 30 mg DAILY PO  Last administered on 2/21/17at 09

:56;  Start 2/7/17 at 14:00


Furosemide (Lasix) 40 mg 1X  ONCE IVP  Last administered on 2/7/17at 14:30;  

Start 2/7/17 at 14:00;  Stop 2/7/17 at 14:23;  Status DC


Furosemide 40 mg 40 mg 1X  ONCE IVP  Last administered on 2/7/17at 14:30;  

Start 2/7/17 at 14:30;  Stop 2/7/17 at 14:38;  Status DC


Nitroglycerin/ Dextrose (Nitroglycerin Drip) 250 ml @ 0 mls/hr CONT  PRN IV SEE 

I/O RECORD Last administered on 2/7/17at 15:23;  Start 2/7/17 at 14:30;  Stop 2/ 8/17 at 16:02;  Status DC


Acetaminophen (Tylenol) 325 mg PRN Q6HRS  PRN PO MILD PAIN / TEMP Last 

administered on 2/13/17at 21:59;  Start 2/7/17 at 15:00


Acetaminophen/ Hydrocodone Bitart (Lortab 5/325) 1 tab PRN Q6HRS  PRN PO 

MODERATE TO SEVERE PAIN Last administered on 2/15/17at 21:18;  Start 2/7/17 at 

15:00


Hydralazine HCl (Apresoline) 10 mg PRN Q4HRS  PRN IVP ELEVATED BP, SEE COMMENTS 

Last administered on 2/20/17at 11:16;  Start 2/7/17 at 15:00


Ondansetron HCl (Zofran) 4 mg PRN Q8HRS  PRN IV NAUSEA/VOMITING;  Start 2/7/17 

at 15:00


Albuterol Sulfate (Ventolin Neb Soln) 2.5 mg PRN Q4HRS  PRN NEB SHORTNESS OF 

BREATH;  Start 2/7/17 at 15:00


Insulin Aspart (Novolog) 0-7 UNITS TIDWMEALS SQ  Last administered on 2/21/17at 

17:12;  Start 2/7/17 at 17:00


Dextrose 12.5 gm PRN Q15MIN  PRN IV SEE COMMENTS;  Start 2/7/17 at 15:00


Info 1 each 1 each PRN DAILY  PRN MC SEE COMMENTS Last administered on 2/9/17at 

09:38;  Start 2/7/17 at 17:00;  Stop 2/10/17 at 08:25;  Status DC


Magnesium Sulfate/ Dextrose (Magnesium Sulfate PREMIX 2GM) 50 ml @ 25 mls/hr 

PRN DAILY  PRN IV for Mag < 1.7 on am labs;  Start 2/8/17 at 11:15


Lidocaine/Sodium Bicarbonate (Buffered Lidocaine 1%) 3 ml 1X  ONCE IJ  Last 

administered on 2/8/17at 14:03;  Start 2/8/17 at 12:30;  Stop 2/8/17 at 12:34;  

Status DC


Heparin Sodium (Porcine) 2,400 unit 1X  ONCE INT CAT  Last administered on 2/8/ 17at 14:04;  Start 2/8/17 at 12:30;  Stop 2/8/17 at 12:34;  Status DC


Heparin Sodium/ Sodium Chloride 60 unit 1X  ONCE IV  Last administered on 2/8/ 17at 14:04;  Start 2/8/17 at 12:30;  Stop 2/8/17 at 12:34;  Status DC


Lisinopril (Prinivil) 20 mg DAILY PO  Last administered on 2/12/17at 09:22;  

Start 2/8/17 at 16:00;  Stop 2/13/17 at 10:31;  Status DC


Hydralazine HCl 50 mg 50 mg BID PO  Last administered on 2/12/17at 20:55;  

Start 2/8/17 at 21:00;  Stop 2/13/17 at 10:31;  Status DC


Sodium Chloride (Iv Sodium Chloride 0.9% 1000ml Bag) 1,000 ml @  1,000 mls/hr 

Q1H PRN IV hypotension;  Start 2/8/17 at 18:28;  Stop 2/9/17 at 00:27;  Status 

DC


Diphenhydramine HCl (Benadryl) 25 mg 1X PRN  PRN IV ITCHING;  Start 2/8/17 at 18

:30;  Stop 2/9/17 at 18:29;  Status DC


Diphenhydramine HCl (Benadryl) 25 mg 1X PRN  PRN IV ITCHING;  Start 2/8/17 at 18

:30;  Stop 2/9/17 at 18:29;  Status DC


Sodium Chloride (Normal Saline Flush) 10 ml 1X PRN  PRN IV AP catheter pack;  

Start 2/8/17 at 18:30;  Stop 2/9/17 at 18:29;  Status DC


Sodium Chloride 10 ml 10 ml 1X PRN  PRN IV  catheter pack;  Start 2/8/17 at 18

:30;  Stop 2/9/17 at 18:29;  Status DC


Sodium Chloride (Iv Sodium Chloride 0.9% 1000ml Bag) 1,000 ml @  400 mls/hr 

Q2H30M PRN IV PATENCY;  Start 2/8/17 at 18:28;  Stop 2/9/17 at 06:27;  Status DC


Info (PHARMACY MONITORING -- do not chart) 1 each PRN DAILY  PRN MC SEE COMMENTS

;  Start 2/8/17 at 18:30


Info (PHARMACY MONITORING -- do not chart) 1 each PRN DAILY  PRN MC SEE COMMENTS

;  Start 2/9/17 at 10:30;  Status UNV


Info (PHARMACY MONITORING -- do not chart) 1 each PRN DAILY  PRN MC SEE COMMENTS

;  Start 2/9/17 at 10:30;  Status Cancel


Darbepoetin Valeriano (Aranesp) 60 mcg WEEKLYHS SQ  Last administered on 2/16/17at 20

:53;  Start 2/9/17 at 21:00


Clopidogrel Bisulfate (Plavix) 75 mg DAILYWBKFT PO ;  Start 2/10/17 at 08:00;  

Stop 2/10/17 at 08:00;  Status DC


Clopidogrel Bisulfate 75 mg 75 mg DAILYWBKFT PO  Last administered on 2/10/17at 

08:25;  Start 2/9/17 at 15:30;  Stop 2/11/17 at 18:54;  Status DC


Sodium Chloride (Iv Sodium Chloride 0.9% 1000ml Bag) 1,000 ml @  1,000 mls/hr 

Q1H PRN IV hypotension;  Start 2/10/17 at 08:11;  Stop 2/10/17 at 15:00;  

Status DC


Diphenhydramine HCl (Benadryl) 25 mg 1X PRN  PRN IV ITCHING;  Start 2/10/17 at 

08:15;  Stop 2/10/17 at 15:00;  Status DC


Diphenhydramine HCl (Benadryl) 25 mg 1X PRN  PRN IV ITCHING;  Start 2/10/17 at 

08:15;  Stop 2/10/17 at 15:00;  Status DC


Sodium Chloride (Normal Saline Flush) 10 ml 1X PRN  PRN IV AP catheter pack;  

Start 2/10/17 at 08:15;  Stop 2/10/17 at 15:00;  Status DC


Sodium Chloride 10 ml 10 ml 1X PRN  PRN IV  catheter pack;  Start 2/10/17 at 

08:15;  Stop 2/10/17 at 15:00;  Status DC


Sodium Chloride (Iv Sodium Chloride 0.9% 1000ml Bag) 1,000 ml @  400 mls/hr 

Q2H30M PRN IV PATENCY;  Start 2/10/17 at 08:11;  Stop 2/10/17 at 21:00;  Status 

DC


Info 1 each 1 each PRN DAILY  PRN MC SEE COMMENTS;  Start 2/10/17 at 08:15;  

Status UNV


Magnesium Sulfate/ Dextrose 100 ml @  100 mls/hr 1X  ONCE IV  Last administered 

on 2/10/17at 12:36;  Start 2/10/17 at 10:30;  Stop 2/10/17 at 11:29;  Status DC


Ceftriaxone Sodium 1 gm/ Sodium Chloride 50 ml @  100 mls/hr Q24H IV  Last 

administered on 2/15/17at 17:35;  Start 2/12/17 at 16:00;  Stop 2/16/17 at 15:02

;  Status DC


Sodium Chloride 1,000 ml @  1,000 mls/hr Q1H PRN IV hypotension;  Start 2/13/17 

at 08:48;  Stop 2/13/17 at 14:47;  Status DC


Sodium Chloride (Iv Sodium Chloride 0.9% 1000ml Bag) 1,000 ml @  400 mls/hr 

Q2H30M PRN IV PATENCY;  Start 2/13/17 at 08:48;  Stop 2/13/17 at 20:47;  Status 

DC


Info (PHARMACY MONITORING -- do not chart) 1 each PRN DAILY  PRN MC SEE COMMENTS

;  Start 2/13/17 at 09:00;  Status UNV


Lisinopril (Prinivil) 40 mg DAILY PO  Last administered on 2/21/17at 09:55;  

Start 2/14/17 at 09:00


Cyanocobalamin (Vitamin B-12) 1,000 mcg DAILY PO  Last administered on 2/21/ 17at 09:53;  Start 2/13/17 at 15:30


Ferrous Sulfate (Feosol) 325 mg BID PO  Last administered on 2/21/17at 21:41;  

Start 2/13/17 at 21:00


Heparin Sodium (Porcine) 10,000 unit STK-MED ONCE .ROUTE ;  Start 2/14/17 at 17:

14;  Stop 2/14/17 at 17:15;  Status DC


Lidocaine/ Epinephrine 20 ml 20 ml STK-MED ONCE .ROUTE ;  Start 2/14/17 at 17:14

;  Stop 2/14/17 at 17:15;  Status DC


Heparin Sodium/ Sodium Chloride 500 ml @  As Directed STK-MED ONCE .ROUTE ;  

Start 2/14/17 at 17:14;  Stop 2/14/17 at 17:15;  Status DC


Fentanyl Citrate (Fentanyl 2ml Vial) 100 mcg STK-MED ONCE .ROUTE ;  Start 2/14/ 17 at 17:35;  Stop 2/14/17 at 17:36;  Status DC


Midazolam HCl 2 mg 2 mg STK-MED ONCE .ROUTE ;  Start 2/14/17 at 17:35;  Stop 2/ 14/17 at 17:36;  Status DC


Cefazolin Sodium (Ancef 1gm Ivpb For Omni) 50 ml @ As Directed STK-MED ONCE IV 

;  Start 2/14/17 at 17:36;  Stop 2/14/17 at 17:37;  Status DC


Heparin Sodium/ Sodium Chloride 1,000 unit 1X  ONCE IART  Last administered on 2 /14/17at 18:04;  Start 2/14/17 at 18:00;  Stop 2/14/17 at 18:02;  Status DC


Midazolam HCl (Versed) 2 mg 1X  ONCE IV  Last administered on 2/14/17at 18:04;  

Start 2/14/17 at 18:00;  Stop 2/14/17 at 18:02;  Status DC


Fentanyl Citrate (Fentanyl 2ml Vial) 100 mcg 1X  ONCE IV  Last administered on 2 /14/17at 18:04;  Start 2/14/17 at 18:00;  Stop 2/14/17 at 18:02;  Status DC


Heparin Sodium (Porcine) 4300 unit 4,300 unit 1X  ONCE IV  Last administered on 

2/14/17at 18:05;  Start 2/14/17 at 18:00;  Stop 2/14/17 at 18:02;  Status DC


Cefazolin Sodium (Ancef 1gm Ivpb For Omni) 50 ml @  100 mls/hr 1X  ONCE IV  

Last administered on 2/14/17at 18:06;  Start 2/14/17 at 18:00;  Stop 2/14/17 at 

18:29;  Status DC


Lidocaine/ Epinephrine 10 ml 10 ml 1X  ONCE IJ  Last administered on 2/14/17at 

18:05;  Start 2/14/17 at 18:00;  Stop 2/14/17 at 18:02;  Status DC


Sodium Chloride 1,000 ml @  1,000 mls/hr Q1H PRN IV hypotension;  Start 2/15/17 

at 14:32;  Stop 2/15/17 at 20:31;  Status DC


Albumin Human (Albuminar) 200 ml @  200 mls/hr 1X PRN  PRN IV Hypotension;  

Start 2/15/17 at 14:45;  Stop 2/15/17 at 20:44;  Status DC


Sodium Chloride (Normal Saline Flush) 10 ml 1X PRN  PRN IV AP catheter pack;  

Start 2/15/17 at 14:45;  Stop 2/16/17 at 14:44;  Status DC


Sodium Chloride 10 ml 10 ml 1X PRN  PRN IV  catheter pack;  Start 2/15/17 at 

14:45;  Stop 2/16/17 at 14:44;  Status DC


Sodium Chloride (Iv Sodium Chloride 0.9% 1000ml Bag) 1,000 ml @  400 mls/hr 

Q2H30M PRN IV PATENCY;  Start 2/15/17 at 14:32;  Stop 2/16/17 at 02:31;  Status 

DC


Glimepiride 1 mg 1 mg DAILY PO  Last administered on 2/18/17at 09:28;  Start 2/ 16/17 at 17:00;  Stop 2/18/17 at 13:46;  Status DC


Sodium Chloride (Iv Sodium Chloride 0.9% 1000ml Bag) 1,000 ml @  1,000 mls/hr 

Q1H PRN IV hypotension;  Start 2/17/17 at 09:18;  Stop 2/17/17 at 15:17;  

Status DC


Acetaminophen (Tylenol) 500 mg 1X PRN  PRN PO MILD PAIN / TEMP;  Start 2/17/17 

at 09:30;  Stop 2/18/17 at 09:29;  Status DC


Diphenhydramine HCl (Benadryl) 25 mg 1X PRN  PRN IV ITCHING;  Start 2/17/17 at 

09:30;  Stop 2/18/17 at 09:29;  Status DC


Diphenhydramine HCl (Benadryl) 25 mg 1X PRN  PRN IV ITCHING;  Start 2/17/17 at 

09:30;  Stop 2/18/17 at 09:29;  Status DC


Sodium Chloride (Normal Saline Flush) 10 ml 1X PRN  PRN IV AP catheter pack;  

Start 2/17/17 at 09:30;  Stop 2/18/17 at 09:29;  Status DC


Sodium Chloride (Normal Saline Flush) 10 ml 1X PRN  PRN IV  catheter pack;  

Start 2/17/17 at 09:30;  Stop 2/18/17 at 09:29;  Status DC


Labetalol HCl 10 mg 10 mg PRN Q1HR  PRN IVP SBP > 180;  Start 2/17/17 at 09:30;

  Stop 2/18/17 at 09:29;  Status DC


Sodium Chloride (Iv Sodium Chloride 0.9% 1000ml Bag) 1,000 ml @  400 mls/hr 

Q2H30M PRN IV PATENCY;  Start 2/17/17 at 09:18;  Stop 2/17/17 at 21:17;  Status 

DC


Info (PHARMACY MONITORING -- do not chart) 1 each PRN DAILY  PRN MC SEE COMMENTS

;  Start 2/17/17 at 09:30;  Status UNV


Amlodipine Besylate (Norvasc) 5 mg DAILY PO  Last administered on 2/21/17at 09:

54;  Start 2/18/17 at 12:00


Glimepiride (Amaryl) 2 mg DAILY PO  Last administered on 2/21/17at 09:52;  

Start 2/19/17 at 09:00


Insulin Detemir 10 units 10 units QHS SQ  Last administered on 2/21/17at 21:00;

  Start 2/19/17 at 21:00


Sodium Chloride (Iv Sodium Chloride 0.9% 1000ml Bag) 1,000 ml @  1,000 mls/hr 

Q1H PRN IV hypotension;  Start 2/20/17 at 13:02;  Stop 2/20/17 at 19:01;  

Status DC


Sodium Chloride (Normal Saline Flush) 10 ml 1X PRN  PRN IV AP catheter pack;  

Start 2/20/17 at 13:15;  Stop 2/21/17 at 13:14;  Status DC


Sodium Chloride (Normal Saline Flush) 10 ml 1X PRN  PRN IV  catheter pack;  

Start 2/20/17 at 13:15;  Stop 2/21/17 at 13:14;  Status DC


Info (PHARMACY MONITORING -- do not chart) 1 each PRN DAILY  PRN MC SEE COMMENTS

;  Start 2/20/17 at 13:15;  Status UNV


Info 1 each 1 each PRN DAILY  PRN MC SEE COMMENTS;  Start 2/20/17 at 13:15;  

Status UNV


Sodium Chloride (Iv Sodium Chloride 0.9% 1000ml Bag) 1,000 ml @  1,000 mls/hr 

Q1H PRN IV hypotension;  Start 2/22/17 at 07:54;  Stop 2/22/17 at 13:53


Diphenhydramine HCl (Benadryl) 25 mg 1X PRN  PRN IV ITCHING;  Start 2/22/17 at 

08:00;  Stop 2/23/17 at 07:59


Diphenhydramine HCl (Benadryl) 25 mg 1X PRN  PRN IV ITCHING;  Start 2/22/17 at 

08:00;  Stop 2/23/17 at 07:59


Sodium Chloride (Normal Saline Flush) 10 ml 1X PRN  PRN IV AP catheter pack;  

Start 2/22/17 at 08:00;  Stop 2/23/17 at 07:59


Sodium Chloride (Normal Saline Flush) 10 ml 1X PRN  PRN IV  catheter pack;  

Start 2/22/17 at 08:00;  Stop 2/23/17 at 07:59


Labetalol HCl 10 mg 10 mg PRN Q1HR  PRN IVP SBP > 180;  Start 2/22/17 at 08:00;

  Stop 2/23/17 at 07:59


Sodium Chloride (Iv Sodium Chloride 0.9% 1000ml Bag) 1,000 ml @  400 mls/hr 

Q2H30M PRN IV PATENCY;  Start 2/22/17 at 07:54;  Stop 2/22/17 at 19:53


Info (PHARMACY MONITORING -- do not chart) 1 each PRN DAILY  PRN MC SEE COMMENTS

;  Start 2/22/17 at 08:00;  Status UNV


Info (PHARMACY MONITORING -- do not chart) 1 each PRN DAILY  PRN MC SEE COMMENTS

;  Start 2/22/17 at 08:00;  Status UNV





Active Scripts


Active


Lisinopril 20 Mg Tablet 20 Mg PO QHS


Isosorbide Mononitrate Er (Isosorbide Mononitrate) 30 Mg Tab.er.24h 30 Mg PO 

DAILY


Hydralazine Hcl 50 Mg Tablet 100 Mg PO TID


Carvedilol 12.5 Mg Tablet 25 Mg PO BIDWMEALS


Atorvastatin Calcium 40 Mg Tablet 80 Mg PO QHS


Aspirin Ec (Aspirin) 81 Mg Tablet.dr 81 Mg PO DAILYWBKFT


Amlodipine Besylate 5 Mg Tablet 5 Mg PO DAILYWLUN


Reported


Clopidogrel (Clopidogrel Bisulfate) 75 Mg Tablet 75 Mg PO DAILY


Glyburide 5 Mg Tablet 1 Tab PO BIDWMEALS


Lisinopril 20 Mg Tablet 1 Tab PO DAILY


Vitals/I & O





 Vital Sign - Last 24 Hours








 2/21/17 2/21/17 2/21/17 2/21/17





 11:01 15:02 17:09 19:05


 


Temp 98.1 98.6  98.5





 98.1 98.6  98.5


 


Pulse 67 68 68 70


 


Resp 20 20  16


 


B/P 178/76 142/52 142/52 148/49


 


Pulse Ox 98 96  99


 


O2 Delivery Room Air Room Air  Room Air


 


    





    





 2/21/17 2/21/17 2/22/17 2/22/17





 20:00 23:00 03:05 07:00


 


Temp  99.7 98.7 98.4





  99.7 98.7 98.4


 


Pulse  72 67 63


 


Resp  16 14 18


 


B/P  142/61 165/56 174/64


 


Pulse Ox  95 98 94


 


O2 Delivery Room Air Room Air Nasal Cannula Nasal Cannula


 


    





    





 2/22/17   





 08:00   


 


O2 Delivery Room Air   


 


O2 Flow Rate 2.0   














 Intake and Output   


 


 2/21/17 2/21/17 2/22/17





 15:00 23:00 07:00


 


Intake Total 480 ml 300 ml 


 


Output Total 300 ml 300 ml 750 ml


 


Balance 180 ml 0 ml -750 ml














OSMEL VALDEZ MD Feb 22, 2017 10:05

## 2017-02-22 NOTE — PDOC
Dialysis Progress Note


Dialysis Note


Dialysis Note


Seen on Hemodialysis, tolerating treatment        Okay     so far





Vitals on Hemodialysis at time of my visit:  191/65   63   16   afeb





 





 


General Appearance:          


Awake:          


Alert Oriented  x      2-3 


Neck:    No JVD or JVP


Chest:    CTA Roberto


Heart:    S1 S2


Abdomen -    Soft NTND


Extremities -    No Edema











ESRD:   Dialysis as below





F 180 NR  3.0  Hrs





3 K 2.5 Ca 140 Na  35   HC03





Qb 350 + Qd 500+





Heparin   on gtt Units





Uf 0-1 Kgs or to dry weight as tolerated





May give 25-50 gms of 25% Albumin or NS if needed to maintain Hemodynamic 

stability





Treatment plan reviewed and discussed with Dialysis RN





Vitals


Vital Signs





 Vital Signs








  Date Time  Temp Pulse Resp B/P Pulse Ox O2 Delivery O2 Flow Rate FiO2


 


2/22/17 08:00      Room Air 2.0 


 


2/22/17 07:00 98.4 63 18 174/64 94   





 98.4       











Labs


Last Labs





Laboratory Tests








Test


  2/20/17


11:38 2/20/17


13:08 2/20/17


17:31 2/20/17


20:32


 


Glucose (Fingerstick)


  268mg/dL


(70-99) 235mg/dL


(70-99) 165mg/dL


(70-99) 311mg/dL


(70-99)














Test


  2/21/17


08:11 2/21/17


10:45 2/21/17


16:44 2/21/17


20:35


 


Glucose (Fingerstick)


  178mg/dL


(70-99) 310mg/dL


(70-99) 264mg/dL


(70-99) 276mg/dL


(70-99)














Test


  2/22/17


08:20 


  


  


 


 


Sodium Level


  142mmol/L


(136-145) 


  


  


 


 


Potassium Level


  4.3mmol/L


(3.5-5.1) 


  


  


 


 


Chloride Level


  105mmol/L


() 


  


  


 


 


Carbon Dioxide Level


  28mmol/L


(21-32) 


  


  


 


 


Anion Gap 9 (6-14)    


 


Blood Urea Nitrogen 37mg/dL (7-20)    


 


Creatinine


  3.2mg/dL


(0.6-1.0) 


  


  


 


 


Estimated GFR


(Cockcroft-Gault) 14.8 


  


  


  


 


 


Glucose Level


  165mg/dL


(70-99) 


  


  


 


 


Calcium Level


  8.0mg/dL


(8.5-10.1) 


  


  


 








Laboratory Tests








Test


  2/21/17


10:45 2/21/17


16:44 2/21/17


20:35 2/22/17


08:20


 


Glucose (Fingerstick)


  310mg/dL


(70-99) 264mg/dL


(70-99) 276mg/dL


(70-99) 


 


 


Sodium Level


  


  


  


  142mmol/L


(136-145)


 


Potassium Level


  


  


  


  4.3mmol/L


(3.5-5.1)


 


Chloride Level


  


  


  


  105mmol/L


()


 


Carbon Dioxide Level


  


  


  


  28mmol/L


(21-32)


 


Anion Gap    9 (6-14) 


 


Blood Urea Nitrogen    37mg/dL (7-20) 


 


Creatinine


  


  


  


  3.2mg/dL


(0.6-1.0)


 


Estimated GFR


(Cockcroft-Gault) 


  


  


  14.8 


 


 


Glucose Level


  


  


  


  165mg/dL


(70-99)


 


Calcium Level


  


  


  


  8.0mg/dL


(8.5-10.1)











Assessment


Assessment


 Problems


Medical Problems:


(1) Congestive heart failure


Status: Acute  





(2) NSTEMI (non-ST elevated myocardial infarction)


Status: Acute  








Problems:  





Plan


Plan of Care


 Problems


Medical Problems:


(1) Congestive heart failure


Status: Acute  





(2) NSTEMI (non-ST elevated myocardial infarction)


Status: Acute  











RENAN DODD MD Feb 22, 2017 10:29

## 2017-02-23 NOTE — PDOC
PROGRESS NOTES


Chief Complaint


Chief Complaint


cc: sob 





A//P


1.  Acute on chronic systolic congestive heart failure. Stable


2.  Non-ST-segment elevation myocardial infarction.


3.  WALTER with CKD on HD


4.  Ischemic cardiomyopathy.


5.  Malignant hypertension, Better


6.  Anemia


7.  Hyperlipidemia.


8.  Psoriasis.


9. CAD


10. Anemia


11. UTI not POA


12. DM2





Plan 


HD per nephrology 


monitor hemoglobin 


BP stable 


SSI with Levemir 


SW to find place for out HD, 


Labs reviewed.





History of Present Illness


History of Present Illness


doing better


no fever


no chills


no acute events





Vitals


Vitals





 Vital Signs








  Date Time  Temp Pulse Resp B/P Pulse Ox O2 Delivery O2 Flow Rate FiO2


 


2/23/17 09:40  69  168/49    


 


2/23/17 07:50 98.6  17  98 Room Air  





 98.6       


 


2/22/17 08:00       2.0 











Physical Exam


General:  Alert, Oriented X3


Heart:  Regular rate, Normal S1, Normal S2, No murmurs, Gallops


Lungs:  Clear, Other


Abdomen:  Normal bowel sounds, Soft, No tenderness, No hepatosplenomegaly, No 

masses


Extremities:  No clubbing, No cyanosis, No edema, Normal pulses, No tenderness/

swelling


Skin:  No breakdown, No significant lesion, Other (psoriasis )





Labs


LABS





Laboratory Tests








Test


  2/22/17


12:05 2/22/17


16:48 2/22/17


21:00 2/23/17


07:59


 


Glucose (Fingerstick)


  124mg/dL


(70-99) 283mg/dL


(70-99) 203mg/dL


(70-99) 227mg/dL


(70-99)











Assessment and Plan


Assessmemt and Plan


 Problems


Medical Problems:


(1) Congestive heart failure


Status: Acute  





(2) NSTEMI (non-ST elevated myocardial infarction)


Status: Acute  








Problems:  





Comment


Review of Relevant


I have reviewed the following items lore (where applicable) has been applied.


Labs





Laboratory Tests








Test


  2/21/17


10:45 2/21/17


16:44 2/21/17


20:35 2/22/17


07:51


 


Glucose (Fingerstick)


  310mg/dL


(70-99) 264mg/dL


(70-99) 276mg/dL


(70-99) 176mg/dL


(70-99)














Test


  2/22/17


08:20 2/22/17


12:05 2/22/17


16:48 2/22/17


21:00


 


Sodium Level


  142mmol/L


(136-145) 


  


  


 


 


Potassium Level


  4.3mmol/L


(3.5-5.1) 


  


  


 


 


Chloride Level


  105mmol/L


() 


  


  


 


 


Carbon Dioxide Level


  28mmol/L


(21-32) 


  


  


 


 


Anion Gap 9 (6-14)    


 


Blood Urea Nitrogen 37mg/dL (7-20)    


 


Creatinine


  3.2mg/dL


(0.6-1.0) 


  


  


 


 


Estimated GFR


(Cockcroft-Gault) 14.8 


  


  


  


 


 


Glucose Level


  165mg/dL


(70-99) 


  


  


 


 


Calcium Level


  8.0mg/dL


(8.5-10.1) 


  


  


 


 


Glucose (Fingerstick)


  


  124mg/dL


(70-99) 283mg/dL


(70-99) 203mg/dL


(70-99)














Test


  2/23/17


07:59 


  


  


 


 


Glucose (Fingerstick)


  227mg/dL


(70-99) 


  


  


 








Laboratory Tests








Test


  2/22/17


12:05 2/22/17


16:48 2/22/17


21:00 2/23/17


07:59


 


Glucose (Fingerstick)


  124mg/dL


(70-99) 283mg/dL


(70-99) 203mg/dL


(70-99) 227mg/dL


(70-99)








Microbiology


2/10/17 Blood Culture - Final, Complete


          NO GROWTH AFTER 5 DAYS


2/12/17 Urine Culture - Final, Complete


          


2/12/17 Urine Culture Result 1 (SEAN) - Final, Complete


Medications





 Current Medications


Aspirin 324 mg 324 mg 1X  ONCE PO  Last administered on 2/7/17at 12:59;  Start 2 /7/17 at 12:45;  Stop 2/7/17 at 12:48;  Status DC


Heparin Sodium/ Dextrose 500 ml @  19 mls/hr CONT  PRN IV SEE I/O RECORD Last 

administered on 2/8/17at 14:45;  Start 2/7/17 at 12:45;  Stop 2/9/17 at 15:30;  

Status DC


Heparin Sodium (Porcine) 1,950 unit PRN Q6HRS  PRN IV FOR UFH LEVEL LESS THAN 

0.2 Last administered on 2/7/17at 13:01;  Start 2/7/17 at 12:45;  Stop 2/9/17 

at 15:30;  Status DC


Ondansetron HCl (Zofran) 4 mg PRN Q8HRS  PRN IV NAUSEA/VOMITING;  Start 2/7/17 

at 12:45;  Stop 2/8/17 at 12:44;  Status DC


Morphine Sulfate 4 mg PRN Q2HR  PRN IV PAIN Last administered on 2/7/17at 14:03

;  Start 2/7/17 at 12:45;  Stop 2/8/17 at 12:44;  Status DC


Nitroglycerin (Nitrostat) 0.4 mg PRN Q5MIN  PRN SL CHEST PAIN Last administered 

on 2/7/17at 14:03;  Start 2/7/17 at 12:45;  Stop 2/8/17 at 12:44;  Status DC


Amlodipine Besylate (Norvasc) 5 mg DAILYWLUN PO  Last administered on 2/8/17at 

08:32;  Start 2/7/17 at 14:00;  Stop 2/8/17 at 16:02;  Status DC


Aspirin (Ecotrin) 81 mg DAILYWBKFT PO  Last administered on 2/23/17at 09:34;  

Start 2/7/17 at 14:00


Atorvastatin Calcium (Lipitor) 80 mg QHS PO  Last administered on 2/22/17at 21:

34;  Start 2/7/17 at 21:00


Carvedilol (Coreg) 25 mg BIDWMEALS PO  Last administered on 2/23/17at 09:36;  

Start 2/7/17 at 17:00


Clopidogrel Bisulfate (Plavix) 75 mg DAILY PO  Last administered on 2/7/17at 14:

28;  Start 2/7/17 at 14:00;  Stop 2/8/17 at 08:41;  Status DC


Isosorbide Mononitrate (Imdur) 30 mg DAILY PO  Last administered on 2/23/17at 09

:40;  Start 2/7/17 at 14:00


Furosemide (Lasix) 40 mg 1X  ONCE IVP  Last administered on 2/7/17at 14:30;  

Start 2/7/17 at 14:00;  Stop 2/7/17 at 14:23;  Status DC


Furosemide 40 mg 40 mg 1X  ONCE IVP  Last administered on 2/7/17at 14:30;  

Start 2/7/17 at 14:30;  Stop 2/7/17 at 14:38;  Status DC


Nitroglycerin/ Dextrose (Nitroglycerin Drip) 250 ml @ 0 mls/hr CONT  PRN IV SEE 

I/O RECORD Last administered on 2/7/17at 15:23;  Start 2/7/17 at 14:30;  Stop 2/ 8/17 at 16:02;  Status DC


Acetaminophen (Tylenol) 325 mg PRN Q6HRS  PRN PO MILD PAIN / TEMP Last 

administered on 2/13/17at 21:59;  Start 2/7/17 at 15:00


Acetaminophen/ Hydrocodone Bitart (Lortab 5/325) 1 tab PRN Q6HRS  PRN PO 

MODERATE TO SEVERE PAIN Last administered on 2/15/17at 21:18;  Start 2/7/17 at 

15:00


Hydralazine HCl (Apresoline) 10 mg PRN Q4HRS  PRN IVP ELEVATED BP, SEE COMMENTS 

Last administered on 2/20/17at 11:16;  Start 2/7/17 at 15:00


Ondansetron HCl (Zofran) 4 mg PRN Q8HRS  PRN IV NAUSEA/VOMITING;  Start 2/7/17 

at 15:00


Albuterol Sulfate (Ventolin Neb Soln) 2.5 mg PRN Q4HRS  PRN NEB SHORTNESS OF 

BREATH;  Start 2/7/17 at 15:00


Insulin Aspart (Novolog) 0-7 UNITS TIDWMEALS SQ  Last administered on 2/23/17at 

09:43;  Start 2/7/17 at 17:00


Dextrose 12.5 gm PRN Q15MIN  PRN IV SEE COMMENTS;  Start 2/7/17 at 15:00


Info 1 each 1 each PRN DAILY  PRN MC SEE COMMENTS Last administered on 2/9/17at 

09:38;  Start 2/7/17 at 17:00;  Stop 2/10/17 at 08:25;  Status DC


Magnesium Sulfate/ Dextrose (Magnesium Sulfate PREMIX 2GM) 50 ml @ 25 mls/hr 

PRN DAILY  PRN IV for Mag < 1.7 on am labs;  Start 2/8/17 at 11:15


Lidocaine/Sodium Bicarbonate (Buffered Lidocaine 1%) 3 ml 1X  ONCE IJ  Last 

administered on 2/8/17at 14:03;  Start 2/8/17 at 12:30;  Stop 2/8/17 at 12:34;  

Status DC


Heparin Sodium (Porcine) 2,400 unit 1X  ONCE INT CAT  Last administered on 2/8/ 17at 14:04;  Start 2/8/17 at 12:30;  Stop 2/8/17 at 12:34;  Status DC


Heparin Sodium/ Sodium Chloride 60 unit 1X  ONCE IV  Last administered on 2/8/ 17at 14:04;  Start 2/8/17 at 12:30;  Stop 2/8/17 at 12:34;  Status DC


Lisinopril (Prinivil) 20 mg DAILY PO  Last administered on 2/12/17at 09:22;  

Start 2/8/17 at 16:00;  Stop 2/13/17 at 10:31;  Status DC


Hydralazine HCl 50 mg 50 mg BID PO  Last administered on 2/12/17at 20:55;  

Start 2/8/17 at 21:00;  Stop 2/13/17 at 10:31;  Status DC


Sodium Chloride (Iv Sodium Chloride 0.9% 1000ml Bag) 1,000 ml @  1,000 mls/hr 

Q1H PRN IV hypotension;  Start 2/8/17 at 18:28;  Stop 2/9/17 at 00:27;  Status 

DC


Diphenhydramine HCl (Benadryl) 25 mg 1X PRN  PRN IV ITCHING;  Start 2/8/17 at 18

:30;  Stop 2/9/17 at 18:29;  Status DC


Diphenhydramine HCl (Benadryl) 25 mg 1X PRN  PRN IV ITCHING;  Start 2/8/17 at 18

:30;  Stop 2/9/17 at 18:29;  Status DC


Sodium Chloride (Normal Saline Flush) 10 ml 1X PRN  PRN IV AP catheter pack;  

Start 2/8/17 at 18:30;  Stop 2/9/17 at 18:29;  Status DC


Sodium Chloride 10 ml 10 ml 1X PRN  PRN IV  catheter pack;  Start 2/8/17 at 18

:30;  Stop 2/9/17 at 18:29;  Status DC


Sodium Chloride (Iv Sodium Chloride 0.9% 1000ml Bag) 1,000 ml @  400 mls/hr 

Q2H30M PRN IV PATENCY;  Start 2/8/17 at 18:28;  Stop 2/9/17 at 06:27;  Status DC


Info (PHARMACY MONITORING -- do not chart) 1 each PRN DAILY  PRN MC SEE COMMENTS

;  Start 2/8/17 at 18:30


Info (PHARMACY MONITORING -- do not chart) 1 each PRN DAILY  PRN MC SEE COMMENTS

;  Start 2/9/17 at 10:30;  Status UNV


Info (PHARMACY MONITORING -- do not chart) 1 each PRN DAILY  PRN MC SEE COMMENTS

;  Start 2/9/17 at 10:30;  Status Cancel


Darbepoetin Valeriano (Aranesp) 60 mcg WEEKLYHS SQ  Last administered on 2/16/17at 20

:53;  Start 2/9/17 at 21:00


Clopidogrel Bisulfate (Plavix) 75 mg DAILYWBKFT PO ;  Start 2/10/17 at 08:00;  

Stop 2/10/17 at 08:00;  Status DC


Clopidogrel Bisulfate 75 mg 75 mg DAILYWBKFT PO  Last administered on 2/10/17at 

08:25;  Start 2/9/17 at 15:30;  Stop 2/11/17 at 18:54;  Status DC


Sodium Chloride (Iv Sodium Chloride 0.9% 1000ml Bag) 1,000 ml @  1,000 mls/hr 

Q1H PRN IV hypotension;  Start 2/10/17 at 08:11;  Stop 2/10/17 at 15:00;  

Status DC


Diphenhydramine HCl (Benadryl) 25 mg 1X PRN  PRN IV ITCHING;  Start 2/10/17 at 

08:15;  Stop 2/10/17 at 15:00;  Status DC


Diphenhydramine HCl (Benadryl) 25 mg 1X PRN  PRN IV ITCHING;  Start 2/10/17 at 

08:15;  Stop 2/10/17 at 15:00;  Status DC


Sodium Chloride (Normal Saline Flush) 10 ml 1X PRN  PRN IV AP catheter pack;  

Start 2/10/17 at 08:15;  Stop 2/10/17 at 15:00;  Status DC


Sodium Chloride 10 ml 10 ml 1X PRN  PRN IV  catheter pack;  Start 2/10/17 at 

08:15;  Stop 2/10/17 at 15:00;  Status DC


Sodium Chloride (Iv Sodium Chloride 0.9% 1000ml Bag) 1,000 ml @  400 mls/hr 

Q2H30M PRN IV PATENCY;  Start 2/10/17 at 08:11;  Stop 2/10/17 at 21:00;  Status 

DC


Info 1 each 1 each PRN DAILY  PRN MC SEE COMMENTS;  Start 2/10/17 at 08:15;  

Status UNV


Magnesium Sulfate/ Dextrose 100 ml @  100 mls/hr 1X  ONCE IV  Last administered 

on 2/10/17at 12:36;  Start 2/10/17 at 10:30;  Stop 2/10/17 at 11:29;  Status DC


Ceftriaxone Sodium 1 gm/ Sodium Chloride 50 ml @  100 mls/hr Q24H IV  Last 

administered on 2/15/17at 17:35;  Start 2/12/17 at 16:00;  Stop 2/16/17 at 15:02

;  Status DC


Sodium Chloride 1,000 ml @  1,000 mls/hr Q1H PRN IV hypotension;  Start 2/13/17 

at 08:48;  Stop 2/13/17 at 14:47;  Status DC


Sodium Chloride (Iv Sodium Chloride 0.9% 1000ml Bag) 1,000 ml @  400 mls/hr 

Q2H30M PRN IV PATENCY;  Start 2/13/17 at 08:48;  Stop 2/13/17 at 20:47;  Status 

DC


Info (PHARMACY MONITORING -- do not chart) 1 each PRN DAILY  PRN MC SEE COMMENTS

;  Start 2/13/17 at 09:00;  Status UNV


Lisinopril (Prinivil) 40 mg DAILY PO  Last administered on 2/23/17at 09:35;  

Start 2/14/17 at 09:00


Cyanocobalamin (Vitamin B-12) 1,000 mcg DAILY PO  Last administered on 2/23/ 17at 09:35;  Start 2/13/17 at 15:30


Ferrous Sulfate (Feosol) 325 mg BID PO  Last administered on 2/23/17at 09:35;  

Start 2/13/17 at 21:00


Heparin Sodium (Porcine) 10,000 unit STK-MED ONCE .ROUTE ;  Start 2/14/17 at 17:

14;  Stop 2/14/17 at 17:15;  Status DC


Lidocaine/ Epinephrine 20 ml 20 ml STK-MED ONCE .ROUTE ;  Start 2/14/17 at 17:14

;  Stop 2/14/17 at 17:15;  Status DC


Heparin Sodium/ Sodium Chloride 500 ml @  As Directed STK-MED ONCE .ROUTE ;  

Start 2/14/17 at 17:14;  Stop 2/14/17 at 17:15;  Status DC


Fentanyl Citrate (Fentanyl 2ml Vial) 100 mcg STK-MED ONCE .ROUTE ;  Start 2/14/ 17 at 17:35;  Stop 2/14/17 at 17:36;  Status DC


Midazolam HCl 2 mg 2 mg STK-MED ONCE .ROUTE ;  Start 2/14/17 at 17:35;  Stop 2/ 14/17 at 17:36;  Status DC


Cefazolin Sodium (Ancef 1gm Ivpb For Omni) 50 ml @ As Directed STK-MED ONCE IV 

;  Start 2/14/17 at 17:36;  Stop 2/14/17 at 17:37;  Status DC


Heparin Sodium/ Sodium Chloride 1,000 unit 1X  ONCE IART  Last administered on 2 /14/17at 18:04;  Start 2/14/17 at 18:00;  Stop 2/14/17 at 18:02;  Status DC


Midazolam HCl (Versed) 2 mg 1X  ONCE IV  Last administered on 2/14/17at 18:04;  

Start 2/14/17 at 18:00;  Stop 2/14/17 at 18:02;  Status DC


Fentanyl Citrate (Fentanyl 2ml Vial) 100 mcg 1X  ONCE IV  Last administered on 2 /14/17at 18:04;  Start 2/14/17 at 18:00;  Stop 2/14/17 at 18:02;  Status DC


Heparin Sodium (Porcine) 4300 unit 4,300 unit 1X  ONCE IV  Last administered on 

2/14/17at 18:05;  Start 2/14/17 at 18:00;  Stop 2/14/17 at 18:02;  Status DC


Cefazolin Sodium (Ancef 1gm Ivpb For Omni) 50 ml @  100 mls/hr 1X  ONCE IV  

Last administered on 2/14/17at 18:06;  Start 2/14/17 at 18:00;  Stop 2/14/17 at 

18:29;  Status DC


Lidocaine/ Epinephrine 10 ml 10 ml 1X  ONCE IJ  Last administered on 2/14/17at 

18:05;  Start 2/14/17 at 18:00;  Stop 2/14/17 at 18:02;  Status DC


Sodium Chloride 1,000 ml @  1,000 mls/hr Q1H PRN IV hypotension;  Start 2/15/17 

at 14:32;  Stop 2/15/17 at 20:31;  Status DC


Albumin Human (Albuminar) 200 ml @  200 mls/hr 1X PRN  PRN IV Hypotension;  

Start 2/15/17 at 14:45;  Stop 2/15/17 at 20:44;  Status DC


Sodium Chloride (Normal Saline Flush) 10 ml 1X PRN  PRN IV AP catheter pack;  

Start 2/15/17 at 14:45;  Stop 2/16/17 at 14:44;  Status DC


Sodium Chloride 10 ml 10 ml 1X PRN  PRN IV  catheter pack;  Start 2/15/17 at 

14:45;  Stop 2/16/17 at 14:44;  Status DC


Sodium Chloride (Iv Sodium Chloride 0.9% 1000ml Bag) 1,000 ml @  400 mls/hr 

Q2H30M PRN IV PATENCY;  Start 2/15/17 at 14:32;  Stop 2/16/17 at 02:31;  Status 

DC


Glimepiride 1 mg 1 mg DAILY PO  Last administered on 2/18/17at 09:28;  Start 2/ 16/17 at 17:00;  Stop 2/18/17 at 13:46;  Status DC


Sodium Chloride (Iv Sodium Chloride 0.9% 1000ml Bag) 1,000 ml @  1,000 mls/hr 

Q1H PRN IV hypotension;  Start 2/17/17 at 09:18;  Stop 2/17/17 at 15:17;  

Status DC


Acetaminophen (Tylenol) 500 mg 1X PRN  PRN PO MILD PAIN / TEMP;  Start 2/17/17 

at 09:30;  Stop 2/18/17 at 09:29;  Status DC


Diphenhydramine HCl (Benadryl) 25 mg 1X PRN  PRN IV ITCHING;  Start 2/17/17 at 

09:30;  Stop 2/18/17 at 09:29;  Status DC


Diphenhydramine HCl (Benadryl) 25 mg 1X PRN  PRN IV ITCHING;  Start 2/17/17 at 

09:30;  Stop 2/18/17 at 09:29;  Status DC


Sodium Chloride (Normal Saline Flush) 10 ml 1X PRN  PRN IV AP catheter pack;  

Start 2/17/17 at 09:30;  Stop 2/18/17 at 09:29;  Status DC


Sodium Chloride (Normal Saline Flush) 10 ml 1X PRN  PRN IV  catheter pack;  

Start 2/17/17 at 09:30;  Stop 2/18/17 at 09:29;  Status DC


Labetalol HCl 10 mg 10 mg PRN Q1HR  PRN IVP SBP > 180;  Start 2/17/17 at 09:30;

  Stop 2/18/17 at 09:29;  Status DC


Sodium Chloride (Iv Sodium Chloride 0.9% 1000ml Bag) 1,000 ml @  400 mls/hr 

Q2H30M PRN IV PATENCY;  Start 2/17/17 at 09:18;  Stop 2/17/17 at 21:17;  Status 

DC


Info (PHARMACY MONITORING -- do not chart) 1 each PRN DAILY  PRN MC SEE COMMENTS

;  Start 2/17/17 at 09:30;  Status UNV


Amlodipine Besylate (Norvasc) 5 mg DAILY PO  Last administered on 2/23/17at 09:

37;  Start 2/18/17 at 12:00


Glimepiride (Amaryl) 2 mg DAILY PO  Last administered on 2/23/17at 09:35;  

Start 2/19/17 at 09:00


Insulin Detemir 10 units 10 units QHS SQ  Last administered on 2/22/17at 21:40;

  Start 2/19/17 at 21:00


Sodium Chloride (Iv Sodium Chloride 0.9% 1000ml Bag) 1,000 ml @  1,000 mls/hr 

Q1H PRN IV hypotension;  Start 2/20/17 at 13:02;  Stop 2/20/17 at 19:01;  

Status DC


Sodium Chloride (Normal Saline Flush) 10 ml 1X PRN  PRN IV AP catheter pack;  

Start 2/20/17 at 13:15;  Stop 2/21/17 at 13:14;  Status DC


Sodium Chloride (Normal Saline Flush) 10 ml 1X PRN  PRN IV  catheter pack;  

Start 2/20/17 at 13:15;  Stop 2/21/17 at 13:14;  Status DC


Info (PHARMACY MONITORING -- do not chart) 1 each PRN DAILY  PRN MC SEE COMMENTS

;  Start 2/20/17 at 13:15;  Status UNV


Info 1 each 1 each PRN DAILY  PRN MC SEE COMMENTS;  Start 2/20/17 at 13:15;  

Status UNV


Sodium Chloride (Iv Sodium Chloride 0.9% 1000ml Bag) 1,000 ml @  1,000 mls/hr 

Q1H PRN IV hypotension;  Start 2/22/17 at 07:54;  Stop 2/22/17 at 13:53;  

Status DC


Diphenhydramine HCl (Benadryl) 25 mg 1X PRN  PRN IV ITCHING;  Start 2/22/17 at 

08:00;  Stop 2/23/17 at 07:59;  Status DC


Diphenhydramine HCl (Benadryl) 25 mg 1X PRN  PRN IV ITCHING;  Start 2/22/17 at 

08:00;  Stop 2/23/17 at 07:59;  Status DC


Sodium Chloride (Normal Saline Flush) 10 ml 1X PRN  PRN IV AP catheter pack;  

Start 2/22/17 at 08:00;  Stop 2/23/17 at 07:59;  Status DC


Sodium Chloride (Normal Saline Flush) 10 ml 1X PRN  PRN IV  catheter pack;  

Start 2/22/17 at 08:00;  Stop 2/23/17 at 07:59;  Status DC


Labetalol HCl 10 mg 10 mg PRN Q1HR  PRN IVP SBP > 180 Last administered on 2/22/ 17at 11:04;  Start 2/22/17 at 08:00;  Stop 2/23/17 at 07:59;  Status DC


Sodium Chloride (Iv Sodium Chloride 0.9% 1000ml Bag) 1,000 ml @  400 mls/hr 

Q2H30M PRN IV PATENCY;  Start 2/22/17 at 07:54;  Stop 2/22/17 at 19:53;  Status 

DC


Info (PHARMACY MONITORING -- do not chart) 1 each PRN DAILY  PRN MC SEE COMMENTS

;  Start 2/22/17 at 08:00;  Status UNV


Info (PHARMACY MONITORING -- do not chart) 1 each PRN DAILY  PRN MC SEE COMMENTS

;  Start 2/22/17 at 08:00;  Status UNV





Active Scripts


Active


Lisinopril 20 Mg Tablet 20 Mg PO QHS


Isosorbide Mononitrate Er (Isosorbide Mononitrate) 30 Mg Tab.er.24h 30 Mg PO 

DAILY


Hydralazine Hcl 50 Mg Tablet 100 Mg PO TID


Carvedilol 12.5 Mg Tablet 25 Mg PO BIDWMEALS


Atorvastatin Calcium 40 Mg Tablet 80 Mg PO QHS


Aspirin Ec (Aspirin) 81 Mg Tablet.dr 81 Mg PO DAILYWBKFT


Amlodipine Besylate 5 Mg Tablet 5 Mg PO DAILYWLUN


Reported


Clopidogrel (Clopidogrel Bisulfate) 75 Mg Tablet 75 Mg PO DAILY


Glyburide 5 Mg Tablet 1 Tab PO BIDWMEALS


Lisinopril 20 Mg Tablet 1 Tab PO DAILY


Vitals/I & O





 Vital Sign - Last 24 Hours








 2/22/17 2/22/17 2/22/17 2/22/17





 11:04 12:07 12:08 12:08


 


Pulse 62 62 62 62


 


B/P 180/72 180/72 180/72 180/72





 2/22/17 2/22/17 2/22/17 2/22/17





 12:09 15:35 17:40 19:47


 


Temp  98.8  99.4





  98.8  99.4


 


Pulse 62 68 68 75


 


Resp  20  20


 


B/P 180/72 148/59 148/59 158/50


 


Pulse Ox  96  99


 


O2 Delivery  Room Air  Room Air


 


    





    





 2/22/17 2/22/17 2/23/17 2/23/17





 20:15 23:20 03:21 07:50


 


Temp  98.7 98.7 98.6





  98.7 98.7 98.6


 


Pulse  69 69 69


 


Resp  18 18 17


 


B/P  149/50 149/82 168/49


 


Pulse Ox  98 98 98


 


O2 Delivery Room Air Room Air Room Air Room Air


 


    





    





 2/23/17 2/23/17 2/23/17 2/23/17





 09:35 09:36 09:37 09:40


 


Pulse 69 69 69 69


 


B/P 168/49 168/49 168/49 168/49














 Intake and Output   


 


 2/22/17 2/22/17 2/23/17





 15:00 23:00 07:00


 


Intake Total  620 ml 


 


Output Total  550 ml 450 ml


 


Balance  70 ml -450 ml














OSMEL VALDEZ MD Feb 23, 2017 10:21

## 2017-02-24 NOTE — PDOC
Dialysis Progress Note


Dialysis Note


Dialysis Note


Seen on Hemodialysis, tolerating treatment        Okay     so far





Vitals on Hemodialysis at time of my visit:  188/76 (just got labetalol)      76

   99.8 





 





 


General Appearance:          


Awake:          


Alert Oriented  x      2-3 


Neck:    No JVD or JVP


Chest:    CTA Roberto


Heart:    S1 S2


Abdomen -    Soft NTND


Extremities -    No Edema











ESRD:   Dialysis as below





F 180 NR  3.0  Hrs





3 K 2.5 Ca 140 Na  35   HC03





Qb 350 + Qd 500+





Heparin   on gtt Units





Uf 0-1 Kgs or to dry weight as tolerated





May give 25-50 gms of 25% Albumin or NS if needed to maintain Hemodynamic 

stability





Treatment plan reviewed and discussed with Dialysis RN





Vitals


Vital Signs





 Vital Signs








  Date Time  Temp Pulse Resp B/P Pulse Ox O2 Delivery O2 Flow Rate FiO2


 


2/24/17 07:30      Room Air 2.0 


 


2/24/17 03:42 99.2 72 16 139/60 97   





 99.2       











Labs


Last Labs





Laboratory Tests








Test


  2/22/17


12:05 2/22/17


16:48 2/22/17


21:00 2/23/17


07:59


 


Glucose (Fingerstick)


  124mg/dL


(70-99) 283mg/dL


(70-99) 203mg/dL


(70-99) 227mg/dL


(70-99)














Test


  2/23/17


11:30 2/23/17


11:31 2/23/17


16:53 2/23/17


20:32


 


Group A Streptococcus Rapid


  Negative


(NEGATIVE) 


  


  


 


 


Glucose (Fingerstick)


  


  248mg/dL


(70-99) 286mg/dL


(70-99) 269mg/dL


(70-99)








Laboratory Tests








Test


  2/23/17


11:30 2/23/17


11:31 2/23/17


16:53 2/23/17


20:32


 


Group A Streptococcus Rapid


  Negative


(NEGATIVE) 


  


  


 


 


Glucose (Fingerstick)


  


  248mg/dL


(70-99) 286mg/dL


(70-99) 269mg/dL


(70-99)











Assessment


Assessment


 Problems


Medical Problems:


(1) Congestive heart failure


Status: Acute  





(2) NSTEMI (non-ST elevated myocardial infarction)


Status: Acute  








Problems:  





Plan


Plan of Care


 Problems


Medical Problems:


(1) Congestive heart failure


Status: Acute  





(2) NSTEMI (non-ST elevated myocardial infarction)


Status: Acute  











DODD,ACHAL K MD Feb 24, 2017 09:40

## 2017-02-24 NOTE — RAD
Chest, 2 views, 2/24/2017:



History: Fever, congestive heart failure, stent placement



Comparison is made to a study from 2/8/2017. A right jugular dialysis type

catheter extends into the superior aspect of the right atrium. The heart is

mildly enlarged. The pulmonary vascularity is normal. No pulmonary infiltrates

are seen. There is no evidence of pleural fluid.



IMPRESSION:

1. Mild cardiomegaly.

2. No acute abnormality is detected.

## 2017-02-24 NOTE — PDOC
PROGRESS NOTES


Chief Complaint


Chief Complaint


cc: sob 





A//P


1.  Acute on chronic systolic congestive heart failure. Stable


2.  Non-ST-segment elevation myocardial infarction.


3.  WALTER with CKD on HD


4.  Ischemic cardiomyopathy.


5.  Malignant hypertension, Better


6.  Anemia


7.  Hyperlipidemia.


8.  Psoriasis.


9. CAD


10. Anemia


11. UTI not POA


12. DM2


13. Fevers. 





Plan 


Blood cx now , 


WBC stable.


CXR


HD per nephrology 


monitor hemoglobin 


BP stable 


SSI with Levemir 


anticipated DC home once vitals are stable and infection is ruled out.





History of Present Illness


History of Present Illness


doing better


no fever


no chills


no acute events





Vitals


Vitals





 Vital Signs








  Date Time  Temp Pulse Resp B/P Pulse Ox O2 Delivery O2 Flow Rate FiO2


 


2/24/17 11:54  78  155/53    


 


2/24/17 07:30      Room Air 2.0 


 


2/24/17 03:42 99.2  16  97   





 99.2       











Physical Exam


General:  Alert, Oriented X3


Heart:  Regular rate, Normal S1, Normal S2, No murmurs, Gallops


Lungs:  Clear, Other


Abdomen:  Normal bowel sounds, Soft, No tenderness, No hepatosplenomegaly, No 

masses


Extremities:  No clubbing, No cyanosis, No edema, Normal pulses, No tenderness/

swelling


Skin:  No breakdown, No significant lesion, Other (psoriasis )





Labs


LABS





Laboratory Tests








Test


  2/23/17


16:53 2/23/17


20:32


 


Glucose (Fingerstick)


  286mg/dL


(70-99) 269mg/dL


(70-99)











Assessment and Plan


Assessmemt and Plan


 Problems


Medical Problems:


(1) Congestive heart failure


Status: Acute  





(2) NSTEMI (non-ST elevated myocardial infarction)


Status: Acute  








Problems:  





Comment


Review of Relevant


I have reviewed the following items lore (where applicable) has been applied.


Labs





Laboratory Tests








Test


  2/22/17


12:05 2/22/17


16:48 2/22/17


21:00 2/23/17


07:59


 


Glucose (Fingerstick)


  124mg/dL


(70-99) 283mg/dL


(70-99) 203mg/dL


(70-99) 227mg/dL


(70-99)














Test


  2/23/17


11:30 2/23/17


11:31 2/23/17


16:53 2/23/17


20:32


 


Group A Streptococcus Rapid


  Negative


(NEGATIVE) 


  


  


 


 


Glucose (Fingerstick)


  


  248mg/dL


(70-99) 286mg/dL


(70-99) 269mg/dL


(70-99)








Laboratory Tests








Test


  2/23/17


16:53 2/23/17


20:32


 


Glucose (Fingerstick)


  286mg/dL


(70-99) 269mg/dL


(70-99)








Microbiology


2/10/17 Blood Culture - Final, Complete


          NO GROWTH AFTER 5 DAYS


2/12/17 Urine Culture - Final, Complete


          


2/12/17 Urine Culture Result 1 (SEAN) - Final, Complete


Medications





 Current Medications


Aspirin 324 mg 324 mg 1X  ONCE PO  Last administered on 2/7/17at 12:59;  Start 2 /7/17 at 12:45;  Stop 2/7/17 at 12:48;  Status DC


Heparin Sodium/ Dextrose 500 ml @  19 mls/hr CONT  PRN IV SEE I/O RECORD Last 

administered on 2/8/17at 14:45;  Start 2/7/17 at 12:45;  Stop 2/9/17 at 15:30;  

Status DC


Heparin Sodium (Porcine) 1,950 unit PRN Q6HRS  PRN IV FOR UFH LEVEL LESS THAN 

0.2 Last administered on 2/7/17at 13:01;  Start 2/7/17 at 12:45;  Stop 2/9/17 

at 15:30;  Status DC


Ondansetron HCl (Zofran) 4 mg PRN Q8HRS  PRN IV NAUSEA/VOMITING;  Start 2/7/17 

at 12:45;  Stop 2/8/17 at 12:44;  Status DC


Morphine Sulfate 4 mg PRN Q2HR  PRN IV PAIN Last administered on 2/7/17at 14:03

;  Start 2/7/17 at 12:45;  Stop 2/8/17 at 12:44;  Status DC


Nitroglycerin (Nitrostat) 0.4 mg PRN Q5MIN  PRN SL CHEST PAIN Last administered 

on 2/7/17at 14:03;  Start 2/7/17 at 12:45;  Stop 2/8/17 at 12:44;  Status DC


Amlodipine Besylate (Norvasc) 5 mg DAILYWLUN PO  Last administered on 2/8/17at 

08:32;  Start 2/7/17 at 14:00;  Stop 2/8/17 at 16:02;  Status DC


Aspirin (Ecotrin) 81 mg DAILYWBKFT PO  Last administered on 2/24/17at 11:53;  

Start 2/7/17 at 14:00


Atorvastatin Calcium (Lipitor) 80 mg QHS PO  Last administered on 2/23/17at 21:

01;  Start 2/7/17 at 21:00


Carvedilol (Coreg) 25 mg BIDWMEALS PO  Last administered on 2/24/17at 11:54;  

Start 2/7/17 at 17:00


Clopidogrel Bisulfate (Plavix) 75 mg DAILY PO  Last administered on 2/7/17at 14:

28;  Start 2/7/17 at 14:00;  Stop 2/8/17 at 08:41;  Status DC


Isosorbide Mononitrate (Imdur) 30 mg DAILY PO  Last administered on 2/24/17at 11

:52;  Start 2/7/17 at 14:00


Furosemide (Lasix) 40 mg 1X  ONCE IVP  Last administered on 2/7/17at 14:30;  

Start 2/7/17 at 14:00;  Stop 2/7/17 at 14:23;  Status DC


Furosemide 40 mg 40 mg 1X  ONCE IVP  Last administered on 2/7/17at 14:30;  

Start 2/7/17 at 14:30;  Stop 2/7/17 at 14:38;  Status DC


Nitroglycerin/ Dextrose (Nitroglycerin Drip) 250 ml @ 0 mls/hr CONT  PRN IV SEE 

I/O RECORD Last administered on 2/7/17at 15:23;  Start 2/7/17 at 14:30;  Stop 2/ 8/17 at 16:02;  Status DC


Acetaminophen (Tylenol) 325 mg PRN Q6HRS  PRN PO MILD PAIN / TEMP Last 

administered on 2/23/17at 21:00;  Start 2/7/17 at 15:00


Acetaminophen/ Hydrocodone Bitart (Lortab 5/325) 1 tab PRN Q6HRS  PRN PO 

MODERATE TO SEVERE PAIN Last administered on 2/15/17at 21:18;  Start 2/7/17 at 

15:00


Hydralazine HCl (Apresoline) 10 mg PRN Q4HRS  PRN IVP ELEVATED BP, SEE COMMENTS 

Last administered on 2/20/17at 11:16;  Start 2/7/17 at 15:00


Ondansetron HCl (Zofran) 4 mg PRN Q8HRS  PRN IV NAUSEA/VOMITING;  Start 2/7/17 

at 15:00


Albuterol Sulfate (Ventolin Neb Soln) 2.5 mg PRN Q4HRS  PRN NEB SHORTNESS OF 

BREATH;  Start 2/7/17 at 15:00


Insulin Aspart (Novolog) 0-7 UNITS TIDWMEALS SQ  Last administered on 2/23/17at 

18:17;  Start 2/7/17 at 17:00


Dextrose 12.5 gm PRN Q15MIN  PRN IV SEE COMMENTS;  Start 2/7/17 at 15:00


Info 1 each 1 each PRN DAILY  PRN MC SEE COMMENTS Last administered on 2/9/17at 

09:38;  Start 2/7/17 at 17:00;  Stop 2/10/17 at 08:25;  Status DC


Magnesium Sulfate/ Dextrose (Magnesium Sulfate PREMIX 2GM) 50 ml @ 25 mls/hr 

PRN DAILY  PRN IV for Mag < 1.7 on am labs;  Start 2/8/17 at 11:15


Lidocaine/Sodium Bicarbonate (Buffered Lidocaine 1%) 3 ml 1X  ONCE IJ  Last 

administered on 2/8/17at 14:03;  Start 2/8/17 at 12:30;  Stop 2/8/17 at 12:34;  

Status DC


Heparin Sodium (Porcine) 2,400 unit 1X  ONCE INT CAT  Last administered on 2/8/ 17at 14:04;  Start 2/8/17 at 12:30;  Stop 2/8/17 at 12:34;  Status DC


Heparin Sodium/ Sodium Chloride 60 unit 1X  ONCE IV  Last administered on 2/8/ 17at 14:04;  Start 2/8/17 at 12:30;  Stop 2/8/17 at 12:34;  Status DC


Lisinopril (Prinivil) 20 mg DAILY PO  Last administered on 2/12/17at 09:22;  

Start 2/8/17 at 16:00;  Stop 2/13/17 at 10:31;  Status DC


Hydralazine HCl 50 mg 50 mg BID PO  Last administered on 2/12/17at 20:55;  

Start 2/8/17 at 21:00;  Stop 2/13/17 at 10:31;  Status DC


Sodium Chloride (Iv Sodium Chloride 0.9% 1000ml Bag) 1,000 ml @  1,000 mls/hr 

Q1H PRN IV hypotension;  Start 2/8/17 at 18:28;  Stop 2/9/17 at 00:27;  Status 

DC


Diphenhydramine HCl (Benadryl) 25 mg 1X PRN  PRN IV ITCHING;  Start 2/8/17 at 18

:30;  Stop 2/9/17 at 18:29;  Status DC


Diphenhydramine HCl (Benadryl) 25 mg 1X PRN  PRN IV ITCHING;  Start 2/8/17 at 18

:30;  Stop 2/9/17 at 18:29;  Status DC


Sodium Chloride (Normal Saline Flush) 10 ml 1X PRN  PRN IV AP catheter pack;  

Start 2/8/17 at 18:30;  Stop 2/9/17 at 18:29;  Status DC


Sodium Chloride 10 ml 10 ml 1X PRN  PRN IV  catheter pack;  Start 2/8/17 at 18

:30;  Stop 2/9/17 at 18:29;  Status DC


Sodium Chloride (Iv Sodium Chloride 0.9% 1000ml Bag) 1,000 ml @  400 mls/hr 

Q2H30M PRN IV PATENCY;  Start 2/8/17 at 18:28;  Stop 2/9/17 at 06:27;  Status DC


Info (PHARMACY MONITORING -- do not chart) 1 each PRN DAILY  PRN MC SEE COMMENTS

;  Start 2/8/17 at 18:30


Info (PHARMACY MONITORING -- do not chart) 1 each PRN DAILY  PRN MC SEE COMMENTS

;  Start 2/9/17 at 10:30;  Status UNV


Info (PHARMACY MONITORING -- do not chart) 1 each PRN DAILY  PRN MC SEE COMMENTS

;  Start 2/9/17 at 10:30;  Status Cancel


Darbepoetin Valeriano (Aranesp) 60 mcg WEEKLYHS SQ  Last administered on 2/23/17at 21

:04;  Start 2/9/17 at 21:00


Clopidogrel Bisulfate (Plavix) 75 mg DAILYWBKFT PO ;  Start 2/10/17 at 08:00;  

Stop 2/10/17 at 08:00;  Status DC


Clopidogrel Bisulfate 75 mg 75 mg DAILYWBKFT PO  Last administered on 2/10/17at 

08:25;  Start 2/9/17 at 15:30;  Stop 2/11/17 at 18:54;  Status DC


Sodium Chloride (Iv Sodium Chloride 0.9% 1000ml Bag) 1,000 ml @  1,000 mls/hr 

Q1H PRN IV hypotension;  Start 2/10/17 at 08:11;  Stop 2/10/17 at 15:00;  

Status DC


Diphenhydramine HCl (Benadryl) 25 mg 1X PRN  PRN IV ITCHING;  Start 2/10/17 at 

08:15;  Stop 2/10/17 at 15:00;  Status DC


Diphenhydramine HCl (Benadryl) 25 mg 1X PRN  PRN IV ITCHING;  Start 2/10/17 at 

08:15;  Stop 2/10/17 at 15:00;  Status DC


Sodium Chloride (Normal Saline Flush) 10 ml 1X PRN  PRN IV AP catheter pack;  

Start 2/10/17 at 08:15;  Stop 2/10/17 at 15:00;  Status DC


Sodium Chloride 10 ml 10 ml 1X PRN  PRN IV  catheter pack;  Start 2/10/17 at 

08:15;  Stop 2/10/17 at 15:00;  Status DC


Sodium Chloride (Iv Sodium Chloride 0.9% 1000ml Bag) 1,000 ml @  400 mls/hr 

Q2H30M PRN IV PATENCY;  Start 2/10/17 at 08:11;  Stop 2/10/17 at 21:00;  Status 

DC


Info 1 each 1 each PRN DAILY  PRN MC SEE COMMENTS;  Start 2/10/17 at 08:15;  

Status UNV


Magnesium Sulfate/ Dextrose 100 ml @  100 mls/hr 1X  ONCE IV  Last administered 

on 2/10/17at 12:36;  Start 2/10/17 at 10:30;  Stop 2/10/17 at 11:29;  Status DC


Ceftriaxone Sodium 1 gm/ Sodium Chloride 50 ml @  100 mls/hr Q24H IV  Last 

administered on 2/15/17at 17:35;  Start 2/12/17 at 16:00;  Stop 2/16/17 at 15:02

;  Status DC


Sodium Chloride 1,000 ml @  1,000 mls/hr Q1H PRN IV hypotension;  Start 2/13/17 

at 08:48;  Stop 2/13/17 at 14:47;  Status DC


Sodium Chloride (Iv Sodium Chloride 0.9% 1000ml Bag) 1,000 ml @  400 mls/hr 

Q2H30M PRN IV PATENCY;  Start 2/13/17 at 08:48;  Stop 2/13/17 at 20:47;  Status 

DC


Info (PHARMACY MONITORING -- do not chart) 1 each PRN DAILY  PRN MC SEE COMMENTS

;  Start 2/13/17 at 09:00;  Status UNV


Lisinopril (Prinivil) 40 mg DAILY PO  Last administered on 2/24/17at 11:53;  

Start 2/14/17 at 09:00


Cyanocobalamin (Vitamin B-12) 1,000 mcg DAILY PO  Last administered on 2/24/ 17at 11:51;  Start 2/13/17 at 15:30


Ferrous Sulfate (Feosol) 325 mg BID PO  Last administered on 2/24/17at 11:53;  

Start 2/13/17 at 21:00


Heparin Sodium (Porcine) 10,000 unit STK-MED ONCE .ROUTE ;  Start 2/14/17 at 17:

14;  Stop 2/14/17 at 17:15;  Status DC


Lidocaine/ Epinephrine 20 ml 20 ml STK-MED ONCE .ROUTE ;  Start 2/14/17 at 17:14

;  Stop 2/14/17 at 17:15;  Status DC


Heparin Sodium/ Sodium Chloride 500 ml @  As Directed STK-MED ONCE .ROUTE ;  

Start 2/14/17 at 17:14;  Stop 2/14/17 at 17:15;  Status DC


Fentanyl Citrate (Fentanyl 2ml Vial) 100 mcg STK-MED ONCE .ROUTE ;  Start 2/14/ 17 at 17:35;  Stop 2/14/17 at 17:36;  Status DC


Midazolam HCl 2 mg 2 mg STK-MED ONCE .ROUTE ;  Start 2/14/17 at 17:35;  Stop 2/ 14/17 at 17:36;  Status DC


Cefazolin Sodium (Ancef 1gm Ivpb For Omni) 50 ml @ As Directed STK-MED ONCE IV 

;  Start 2/14/17 at 17:36;  Stop 2/14/17 at 17:37;  Status DC


Heparin Sodium/ Sodium Chloride 1,000 unit 1X  ONCE IART  Last administered on 2 /14/17at 18:04;  Start 2/14/17 at 18:00;  Stop 2/14/17 at 18:02;  Status DC


Midazolam HCl (Versed) 2 mg 1X  ONCE IV  Last administered on 2/14/17at 18:04;  

Start 2/14/17 at 18:00;  Stop 2/14/17 at 18:02;  Status DC


Fentanyl Citrate (Fentanyl 2ml Vial) 100 mcg 1X  ONCE IV  Last administered on 2 /14/17at 18:04;  Start 2/14/17 at 18:00;  Stop 2/14/17 at 18:02;  Status DC


Heparin Sodium (Porcine) 4300 unit 4,300 unit 1X  ONCE IV  Last administered on 

2/14/17at 18:05;  Start 2/14/17 at 18:00;  Stop 2/14/17 at 18:02;  Status DC


Cefazolin Sodium (Ancef 1gm Ivpb For Omni) 50 ml @  100 mls/hr 1X  ONCE IV  

Last administered on 2/14/17at 18:06;  Start 2/14/17 at 18:00;  Stop 2/14/17 at 

18:29;  Status DC


Lidocaine/ Epinephrine 10 ml 10 ml 1X  ONCE IJ  Last administered on 2/14/17at 

18:05;  Start 2/14/17 at 18:00;  Stop 2/14/17 at 18:02;  Status DC


Sodium Chloride 1,000 ml @  1,000 mls/hr Q1H PRN IV hypotension;  Start 2/15/17 

at 14:32;  Stop 2/15/17 at 20:31;  Status DC


Albumin Human (Albuminar) 200 ml @  200 mls/hr 1X PRN  PRN IV Hypotension;  

Start 2/15/17 at 14:45;  Stop 2/15/17 at 20:44;  Status DC


Sodium Chloride (Normal Saline Flush) 10 ml 1X PRN  PRN IV AP catheter pack;  

Start 2/15/17 at 14:45;  Stop 2/16/17 at 14:44;  Status DC


Sodium Chloride 10 ml 10 ml 1X PRN  PRN IV  catheter pack;  Start 2/15/17 at 

14:45;  Stop 2/16/17 at 14:44;  Status DC


Sodium Chloride (Iv Sodium Chloride 0.9% 1000ml Bag) 1,000 ml @  400 mls/hr 

Q2H30M PRN IV PATENCY;  Start 2/15/17 at 14:32;  Stop 2/16/17 at 02:31;  Status 

DC


Glimepiride 1 mg 1 mg DAILY PO  Last administered on 2/18/17at 09:28;  Start 2/ 16/17 at 17:00;  Stop 2/18/17 at 13:46;  Status DC


Sodium Chloride (Iv Sodium Chloride 0.9% 1000ml Bag) 1,000 ml @  1,000 mls/hr 

Q1H PRN IV hypotension;  Start 2/17/17 at 09:18;  Stop 2/17/17 at 15:17;  

Status DC


Acetaminophen (Tylenol) 500 mg 1X PRN  PRN PO MILD PAIN / TEMP;  Start 2/17/17 

at 09:30;  Stop 2/18/17 at 09:29;  Status DC


Diphenhydramine HCl (Benadryl) 25 mg 1X PRN  PRN IV ITCHING;  Start 2/17/17 at 

09:30;  Stop 2/18/17 at 09:29;  Status DC


Diphenhydramine HCl (Benadryl) 25 mg 1X PRN  PRN IV ITCHING;  Start 2/17/17 at 

09:30;  Stop 2/18/17 at 09:29;  Status DC


Sodium Chloride (Normal Saline Flush) 10 ml 1X PRN  PRN IV AP catheter pack;  

Start 2/17/17 at 09:30;  Stop 2/18/17 at 09:29;  Status DC


Sodium Chloride (Normal Saline Flush) 10 ml 1X PRN  PRN IV  catheter pack;  

Start 2/17/17 at 09:30;  Stop 2/18/17 at 09:29;  Status DC


Labetalol HCl 10 mg 10 mg PRN Q1HR  PRN IVP SBP > 180;  Start 2/17/17 at 09:30;

  Stop 2/18/17 at 09:29;  Status DC


Sodium Chloride (Iv Sodium Chloride 0.9% 1000ml Bag) 1,000 ml @  400 mls/hr 

Q2H30M PRN IV PATENCY;  Start 2/17/17 at 09:18;  Stop 2/17/17 at 21:17;  Status 

DC


Info (PHARMACY MONITORING -- do not chart) 1 each PRN DAILY  PRN MC SEE COMMENTS

;  Start 2/17/17 at 09:30;  Status UNV


Amlodipine Besylate (Norvasc) 5 mg DAILY PO  Last administered on 2/23/17at 09:

37;  Start 2/18/17 at 12:00;  Stop 2/23/17 at 10:23;  Status DC


Glimepiride (Amaryl) 2 mg DAILY PO  Last administered on 2/24/17at 11:52;  

Start 2/19/17 at 09:00


Insulin Detemir 10 units 10 units QHS SQ  Last administered on 2/23/17at 21:07;

  Start 2/19/17 at 21:00


Sodium Chloride (Iv Sodium Chloride 0.9% 1000ml Bag) 1,000 ml @  1,000 mls/hr 

Q1H PRN IV hypotension;  Start 2/20/17 at 13:02;  Stop 2/20/17 at 19:01;  

Status DC


Sodium Chloride (Normal Saline Flush) 10 ml 1X PRN  PRN IV AP catheter pack;  

Start 2/20/17 at 13:15;  Stop 2/21/17 at 13:14;  Status DC


Sodium Chloride (Normal Saline Flush) 10 ml 1X PRN  PRN IV  catheter pack;  

Start 2/20/17 at 13:15;  Stop 2/21/17 at 13:14;  Status DC


Info (PHARMACY MONITORING -- do not chart) 1 each PRN DAILY  PRN MC SEE COMMENTS

;  Start 2/20/17 at 13:15;  Status UNV


Info 1 each 1 each PRN DAILY  PRN MC SEE COMMENTS;  Start 2/20/17 at 13:15;  

Status UNV


Sodium Chloride (Iv Sodium Chloride 0.9% 1000ml Bag) 1,000 ml @  1,000 mls/hr 

Q1H PRN IV hypotension;  Start 2/22/17 at 07:54;  Stop 2/22/17 at 13:53;  

Status DC


Diphenhydramine HCl (Benadryl) 25 mg 1X PRN  PRN IV ITCHING;  Start 2/22/17 at 

08:00;  Stop 2/23/17 at 07:59;  Status DC


Diphenhydramine HCl (Benadryl) 25 mg 1X PRN  PRN IV ITCHING;  Start 2/22/17 at 

08:00;  Stop 2/23/17 at 07:59;  Status DC


Sodium Chloride (Normal Saline Flush) 10 ml 1X PRN  PRN IV AP catheter pack;  

Start 2/22/17 at 08:00;  Stop 2/23/17 at 07:59;  Status DC


Sodium Chloride (Normal Saline Flush) 10 ml 1X PRN  PRN IV  catheter pack;  

Start 2/22/17 at 08:00;  Stop 2/23/17 at 07:59;  Status DC


Labetalol HCl 10 mg 10 mg PRN Q1HR  PRN IVP SBP > 180 Last administered on 2/22/ 17at 11:04;  Start 2/22/17 at 08:00;  Stop 2/23/17 at 07:59;  Status DC


Sodium Chloride (Iv Sodium Chloride 0.9% 1000ml Bag) 1,000 ml @  400 mls/hr 

Q2H30M PRN IV PATENCY;  Start 2/22/17 at 07:54;  Stop 2/22/17 at 19:53;  Status 

DC


Info (PHARMACY MONITORING -- do not chart) 1 each PRN DAILY  PRN MC SEE COMMENTS

;  Start 2/22/17 at 08:00;  Status UNV


Info (PHARMACY MONITORING -- do not chart) 1 each PRN DAILY  PRN MC SEE COMMENTS

;  Start 2/22/17 at 08:00;  Status UNV


Amlodipine Besylate (Norvasc) 10 mg DAILY PO  Last administered on 2/24/17at 11:

53;  Start 2/23/17 at 11:00


Throat Lozenges 1 shahida 1 shahida PRN Q2HRS  PRN PO SORE THROAT Last administered on 2 /23/17at 21:01;  Start 2/23/17 at 11:15


Sodium Chloride (Iv Sodium Chloride 0.9% 1000ml Bag) 1,000 ml @  1,000 mls/hr 

Q1H PRN IV hypotension;  Start 2/24/17 at 07:44;  Stop 2/24/17 at 13:43


Diphenhydramine HCl (Benadryl) 25 mg 1X PRN  PRN IV ITCHING;  Start 2/24/17 at 

07:45;  Stop 2/25/17 at 07:44


Diphenhydramine HCl (Benadryl) 25 mg 1X PRN  PRN IV ITCHING;  Start 2/24/17 at 

07:45;  Stop 2/25/17 at 07:44


Sodium Chloride (Normal Saline Flush) 10 ml 1X PRN  PRN IV AP catheter pack;  

Start 2/24/17 at 07:45;  Stop 2/25/17 at 07:44


Sodium Chloride (Normal Saline Flush) 10 ml 1X PRN  PRN IV  catheter pack;  

Start 2/24/17 at 07:45;  Stop 2/25/17 at 07:44


Labetalol HCl (Normodyne) 10 mg PRN Q1HR  PRN IVP SBP > 180 Last administered 

on 2/24/17at 10:56;  Start 2/24/17 at 07:45;  Stop 2/25/17 at 07:44


Clonidine HCl 0.1 mg 0.1 mg 1X PRN  PRN PO SBP > 180 Last administered on 2/24/ 17at 11:03;  Start 2/24/17 at 07:45;  Stop 2/25/17 at 07:44


Sodium Chloride (Iv Sodium Chloride 0.9% 1000ml Bag) 1,000 ml @  400 mls/hr 

Q2H30M PRN IV PATENCY;  Start 2/24/17 at 07:44;  Stop 2/24/17 at 19:43


Info (PHARMACY MONITORING -- do not chart) 1 each PRN DAILY  PRN MC SEE COMMENTS

;  Start 2/24/17 at 07:45;  Status UNV





Active Scripts


Active


Lisinopril 20 Mg Tablet 20 Mg PO QHS


Isosorbide Mononitrate Er (Isosorbide Mononitrate) 30 Mg Tab.er.24h 30 Mg PO 

DAILY


Hydralazine Hcl 50 Mg Tablet 100 Mg PO TID


Carvedilol 12.5 Mg Tablet 25 Mg PO BIDWMEALS


Atorvastatin Calcium 40 Mg Tablet 80 Mg PO QHS


Aspirin Ec (Aspirin) 81 Mg Tablet. 81 Mg PO DAILYWBKFT


Amlodipine Besylate 5 Mg Tablet 5 Mg PO DAILYWLUN


Reported


Clopidogrel (Clopidogrel Bisulfate) 75 Mg Tablet 75 Mg PO DAILY


Glyburide 5 Mg Tablet 1 Tab PO BIDWMEALS


Lisinopril 20 Mg Tablet 1 Tab PO DAILY


Vitals/I & O





 Vital Sign - Last 24 Hours








 2/23/17 2/23/17 2/23/17 2/23/17





 14:26 18:14 19:40 20:00


 


Temp 98.7  100.1 





 98.7  100.1 


 


Pulse 73 73 82 


 


Resp 18  18 


 


B/P 150/53 150/53 141/50 


 


Pulse Ox 98  97 


 


O2 Delivery Room Air  Room Air Room Air


 


    





    





 2/23/17 2/24/17 2/24/17 2/24/17





 23:05 03:42 07:30 09:43


 


Temp 99.0 99.2  





 99.0 99.2  


 


Pulse 85 72  76


 


Resp 16 16  


 


B/P 146/53 139/60  188/76


 


Pulse Ox 96 97  


 


O2 Delivery Room Air Room Air Room Air 


 


O2 Flow Rate   2.0 


 


    





    





 2/24/17 2/24/17 2/24/17 2/24/17





 10:56 11:03 11:52 11:53


 


Pulse 76 72 79 72


 


B/P 197/78 177/70 155/53 155/53





 2/24/17 2/24/17  





 11:53 11:54  


 


Pulse 72 78  


 


B/P 155/53 155/53  














 Intake and Output   


 


 2/23/17 2/23/17 2/24/17





 15:00 23:00 07:00


 


Intake Total  200 ml 


 


Output Total  200 ml 200 ml


 


Balance  0 ml -200 ml














OSMEL VALDEZ MD Feb 24, 2017 12:03

## 2017-02-25 NOTE — PDOC
PROGRESS NOTES


Chief Complaint


Chief Complaint


1.  Acute on chronic systolic congestive heart failure. Stable


2.  Non-ST-segment elevation myocardial infarction.


3.  WALTER with CKD on HD


4.  Ischemic cardiomyopathy.


5.  Malignant hypertension, Better


6.  Anemia


7.  Hyperlipidemia.


8.  Psoriasis.


9. CAD


10. Anemia


11. UTI not POA


12. DM2


13. Fevers.





History of Present Illness


History of Present Illness


Patient awake, alert, and oriented when seen and examined this AM.  Pt states 

that she is doing "better".  Family member present and at bedside.  Pt denies 

any current


CP or SOA. 





VSS- All questions and concerns addressed and answered.





Vitals


Vitals





 Vital Signs








  Date Time  Temp Pulse Resp B/P Pulse Ox O2 Delivery O2 Flow Rate FiO2


 


2/25/17 10:56 99.9 70 18 132/44 93 Room Air  





 99.9       


 


2/24/17 07:30       2.0 











Physical Exam


General:  Alert, Oriented X3, Cooperative, No acute distress


Heart:  Regular rate, Normal S1, Normal S2, No murmurs, Gallops


Lungs:  Clear, Other


Abdomen:  Normal bowel sounds, Soft, No tenderness, No hepatosplenomegaly, No 

masses


Extremities:  No clubbing, No cyanosis, No edema, Normal pulses, No tenderness/

swelling


Skin:  No rashes, No breakdown, No significant lesion, Other (psoriasis )





Labs


LABS





Laboratory Tests








Test


  2/24/17


11:58 2/24/17


16:35 2/24/17


18:36 2/24/17


21:29


 


Glucose (Fingerstick)


  122mg/dL


(70-99) 253mg/dL


(70-99) 


  264mg/dL


(70-99)


 


Influenza Type A Antigen


  


  


  Negative


(NEGATIVE) 


 


 


Influenza Type B Antigen


  


  


  Negative


(NEGATIVE) 


 














Test


  2/25/17


07:20 


  


  


 


 


Glucose (Fingerstick)


  142mg/dL


(70-99) 


  


  


 











Review of Systems


Review of Systems


Patient complaint of fatigue


Patient complaint of hunger





Assessment and Plan


Assessmemt and Plan


 Problems


Medical Problems:


(1) Congestive heart failure


Status: Acute  





(2) NSTEMI (non-ST elevated myocardial infarction)


Status: Acute  





Assessment:





1.  Acute on chronic systolic congestive heart failure. Stable


2.  Non-ST-segment elevation myocardial infarction.


3.  WALTER with CKD on HD


4.  Ischemic cardiomyopathy.


5.  Malignant hypertension, Better


6.  Anemia


7.  Hyperlipidemia.


8.  Psoriasis.


9. CAD


10. Anemia


11. UTI not POA


12. DM2


13. Fevers.  





Plan:





Continue to monitor the patient per floor protocol


Continue daily labs- CBC, BMP, BUN, and Cr


Daily PTOT


Continue HD





Appreciate all subspecialty input and recommendations





Discussed with family present


DW RN


Problems:  





Comment


Review of Relevant


I have reviewed the following items lore (where applicable) has been applied.


Labs





Laboratory Tests








Test


  2/23/17


11:30 2/23/17


11:31 2/23/17


16:53 2/23/17


20:32


 


Group A Streptococcus Rapid


  Negative


(NEGATIVE) 


  


  


 


 


Glucose (Fingerstick)


  


  248mg/dL


(70-99) 286mg/dL


(70-99) 269mg/dL


(70-99)














Test


  2/24/17


11:58 2/24/17


16:35 2/24/17


18:36 2/24/17


21:29


 


Glucose (Fingerstick)


  122mg/dL


(70-99) 253mg/dL


(70-99) 


  264mg/dL


(70-99)


 


Influenza Type A Antigen


  


  


  Negative


(NEGATIVE) 


 


 


Influenza Type B Antigen


  


  


  Negative


(NEGATIVE) 


 














Test


  2/25/17


07:20 


  


  


 


 


Glucose (Fingerstick)


  142mg/dL


(70-99) 


  


  


 








Laboratory Tests








Test


  2/24/17


11:58 2/24/17


16:35 2/24/17


18:36 2/24/17


21:29


 


Glucose (Fingerstick)


  122mg/dL


(70-99) 253mg/dL


(70-99) 


  264mg/dL


(70-99)


 


Influenza Type A Antigen


  


  


  Negative


(NEGATIVE) 


 


 


Influenza Type B Antigen


  


  


  Negative


(NEGATIVE) 


 














Test


  2/25/17


07:20 


  


  


 


 


Glucose (Fingerstick)


  142mg/dL


(70-99) 


  


  


 








Microbiology


2/10/17 Blood Culture - Final, Complete


          NO GROWTH AFTER 5 DAYS


2/23/17 Throat Culture - Preliminary, Resulted


          


2/23/17 - Preliminary, Resulted


          


2/12/17 Urine Culture - Final, Complete


          


2/12/17 Urine Culture Result 1 (SEAN) - Final, Complete


Medications





 Current Medications


Aspirin 324 mg 324 mg 1X  ONCE PO  Last administered on 2/7/17at 12:59;  Start 2 /7/17 at 12:45;  Stop 2/7/17 at 12:48;  Status DC


Heparin Sodium/ Dextrose 500 ml @  19 mls/hr CONT  PRN IV SEE I/O RECORD Last 

administered on 2/8/17at 14:45;  Start 2/7/17 at 12:45;  Stop 2/9/17 at 15:30;  

Status DC


Heparin Sodium (Porcine) 1,950 unit PRN Q6HRS  PRN IV FOR UFH LEVEL LESS THAN 

0.2 Last administered on 2/7/17at 13:01;  Start 2/7/17 at 12:45;  Stop 2/9/17 

at 15:30;  Status DC


Ondansetron HCl (Zofran) 4 mg PRN Q8HRS  PRN IV NAUSEA/VOMITING;  Start 2/7/17 

at 12:45;  Stop 2/8/17 at 12:44;  Status DC


Morphine Sulfate 4 mg PRN Q2HR  PRN IV PAIN Last administered on 2/7/17at 14:03

;  Start 2/7/17 at 12:45;  Stop 2/8/17 at 12:44;  Status DC


Nitroglycerin (Nitrostat) 0.4 mg PRN Q5MIN  PRN SL CHEST PAIN Last administered 

on 2/7/17at 14:03;  Start 2/7/17 at 12:45;  Stop 2/8/17 at 12:44;  Status DC


Amlodipine Besylate (Norvasc) 5 mg DAILYWLUN PO  Last administered on 2/8/17at 

08:32;  Start 2/7/17 at 14:00;  Stop 2/8/17 at 16:02;  Status DC


Aspirin (Ecotrin) 81 mg DAILYWBKFT PO  Last administered on 2/25/17at 08:53;  

Start 2/7/17 at 14:00


Atorvastatin Calcium (Lipitor) 80 mg QHS PO  Last administered on 2/24/17at 21:

26;  Start 2/7/17 at 21:00


Carvedilol (Coreg) 25 mg BIDWMEALS PO  Last administered on 2/25/17at 08:54;  

Start 2/7/17 at 17:00


Clopidogrel Bisulfate (Plavix) 75 mg DAILY PO  Last administered on 2/7/17at 14:

28;  Start 2/7/17 at 14:00;  Stop 2/8/17 at 08:41;  Status DC


Isosorbide Mononitrate (Imdur) 30 mg DAILY PO  Last administered on 2/25/17at 08

:53;  Start 2/7/17 at 14:00


Furosemide (Lasix) 40 mg 1X  ONCE IVP  Last administered on 2/7/17at 14:30;  

Start 2/7/17 at 14:00;  Stop 2/7/17 at 14:23;  Status DC


Furosemide 40 mg 40 mg 1X  ONCE IVP  Last administered on 2/7/17at 14:30;  

Start 2/7/17 at 14:30;  Stop 2/7/17 at 14:38;  Status DC


Nitroglycerin/ Dextrose (Nitroglycerin Drip) 250 ml @ 0 mls/hr CONT  PRN IV SEE 

I/O RECORD Last administered on 2/7/17at 15:23;  Start 2/7/17 at 14:30;  Stop 2/ 8/17 at 16:02;  Status DC


Acetaminophen (Tylenol) 325 mg PRN Q6HRS  PRN PO MILD PAIN / TEMP Last 

administered on 2/25/17at 08:54;  Start 2/7/17 at 15:00


Acetaminophen/ Hydrocodone Bitart (Lortab 5/325) 1 tab PRN Q6HRS  PRN PO 

MODERATE TO SEVERE PAIN Last administered on 2/15/17at 21:18;  Start 2/7/17 at 

15:00


Hydralazine HCl (Apresoline) 10 mg PRN Q4HRS  PRN IVP ELEVATED BP, SEE COMMENTS 

Last administered on 2/20/17at 11:16;  Start 2/7/17 at 15:00


Ondansetron HCl (Zofran) 4 mg PRN Q8HRS  PRN IV NAUSEA/VOMITING;  Start 2/7/17 

at 15:00


Albuterol Sulfate (Ventolin Neb Soln) 2.5 mg PRN Q4HRS  PRN NEB SHORTNESS OF 

BREATH;  Start 2/7/17 at 15:00


Insulin Aspart (Novolog) 0-7 UNITS TIDWMEALS SQ  Last administered on 2/23/17at 

18:17;  Start 2/7/17 at 17:00


Dextrose 12.5 gm PRN Q15MIN  PRN IV SEE COMMENTS;  Start 2/7/17 at 15:00


Info 1 each 1 each PRN DAILY  PRN MC SEE COMMENTS Last administered on 2/9/17at 

09:38;  Start 2/7/17 at 17:00;  Stop 2/10/17 at 08:25;  Status DC


Magnesium Sulfate/ Dextrose (Magnesium Sulfate PREMIX 2GM) 50 ml @ 25 mls/hr 

PRN DAILY  PRN IV for Mag < 1.7 on am labs;  Start 2/8/17 at 11:15


Lidocaine/Sodium Bicarbonate (Buffered Lidocaine 1%) 3 ml 1X  ONCE IJ  Last 

administered on 2/8/17at 14:03;  Start 2/8/17 at 12:30;  Stop 2/8/17 at 12:34;  

Status DC


Heparin Sodium (Porcine) 2,400 unit 1X  ONCE INT CAT  Last administered on 2/8/ 17at 14:04;  Start 2/8/17 at 12:30;  Stop 2/8/17 at 12:34;  Status DC


Heparin Sodium/ Sodium Chloride 60 unit 1X  ONCE IV  Last administered on 2/8/ 17at 14:04;  Start 2/8/17 at 12:30;  Stop 2/8/17 at 12:34;  Status DC


Lisinopril (Prinivil) 20 mg DAILY PO  Last administered on 2/12/17at 09:22;  

Start 2/8/17 at 16:00;  Stop 2/13/17 at 10:31;  Status DC


Hydralazine HCl 50 mg 50 mg BID PO  Last administered on 2/12/17at 20:55;  

Start 2/8/17 at 21:00;  Stop 2/13/17 at 10:31;  Status DC


Sodium Chloride (Iv Sodium Chloride 0.9% 1000ml Bag) 1,000 ml @  1,000 mls/hr 

Q1H PRN IV hypotension;  Start 2/8/17 at 18:28;  Stop 2/9/17 at 00:27;  Status 

DC


Diphenhydramine HCl (Benadryl) 25 mg 1X PRN  PRN IV ITCHING;  Start 2/8/17 at 18

:30;  Stop 2/9/17 at 18:29;  Status DC


Diphenhydramine HCl (Benadryl) 25 mg 1X PRN  PRN IV ITCHING;  Start 2/8/17 at 18

:30;  Stop 2/9/17 at 18:29;  Status DC


Sodium Chloride (Normal Saline Flush) 10 ml 1X PRN  PRN IV AP catheter pack;  

Start 2/8/17 at 18:30;  Stop 2/9/17 at 18:29;  Status DC


Sodium Chloride 10 ml 10 ml 1X PRN  PRN IV  catheter pack;  Start 2/8/17 at 18

:30;  Stop 2/9/17 at 18:29;  Status DC


Sodium Chloride (Iv Sodium Chloride 0.9% 1000ml Bag) 1,000 ml @  400 mls/hr 

Q2H30M PRN IV PATENCY;  Start 2/8/17 at 18:28;  Stop 2/9/17 at 06:27;  Status DC


Info (PHARMACY MONITORING -- do not chart) 1 each PRN DAILY  PRN MC SEE COMMENTS

;  Start 2/8/17 at 18:30


Info (PHARMACY MONITORING -- do not chart) 1 each PRN DAILY  PRN MC SEE COMMENTS

;  Start 2/9/17 at 10:30;  Status UNV


Info (PHARMACY MONITORING -- do not chart) 1 each PRN DAILY  PRN MC SEE COMMENTS

;  Start 2/9/17 at 10:30;  Status Cancel


Darbepoetin Valeriano (Aranesp) 60 mcg WEEKLYHS SQ  Last administered on 2/23/17at 21

:04;  Start 2/9/17 at 21:00


Clopidogrel Bisulfate (Plavix) 75 mg DAILYWBKFT PO ;  Start 2/10/17 at 08:00;  

Stop 2/10/17 at 08:00;  Status DC


Clopidogrel Bisulfate 75 mg 75 mg DAILYWBKFT PO  Last administered on 2/10/17at 

08:25;  Start 2/9/17 at 15:30;  Stop 2/11/17 at 18:54;  Status DC


Sodium Chloride (Iv Sodium Chloride 0.9% 1000ml Bag) 1,000 ml @  1,000 mls/hr 

Q1H PRN IV hypotension;  Start 2/10/17 at 08:11;  Stop 2/10/17 at 15:00;  

Status DC


Diphenhydramine HCl (Benadryl) 25 mg 1X PRN  PRN IV ITCHING;  Start 2/10/17 at 

08:15;  Stop 2/10/17 at 15:00;  Status DC


Diphenhydramine HCl (Benadryl) 25 mg 1X PRN  PRN IV ITCHING;  Start 2/10/17 at 

08:15;  Stop 2/10/17 at 15:00;  Status DC


Sodium Chloride (Normal Saline Flush) 10 ml 1X PRN  PRN IV AP catheter pack;  

Start 2/10/17 at 08:15;  Stop 2/10/17 at 15:00;  Status DC


Sodium Chloride 10 ml 10 ml 1X PRN  PRN IV  catheter pack;  Start 2/10/17 at 

08:15;  Stop 2/10/17 at 15:00;  Status DC


Sodium Chloride (Iv Sodium Chloride 0.9% 1000ml Bag) 1,000 ml @  400 mls/hr 

Q2H30M PRN IV PATENCY;  Start 2/10/17 at 08:11;  Stop 2/10/17 at 21:00;  Status 

DC


Info 1 each 1 each PRN DAILY  PRN MC SEE COMMENTS;  Start 2/10/17 at 08:15;  

Status UNV


Magnesium Sulfate/ Dextrose 100 ml @  100 mls/hr 1X  ONCE IV  Last administered 

on 2/10/17at 12:36;  Start 2/10/17 at 10:30;  Stop 2/10/17 at 11:29;  Status DC


Ceftriaxone Sodium 1 gm/ Sodium Chloride 50 ml @  100 mls/hr Q24H IV  Last 

administered on 2/15/17at 17:35;  Start 2/12/17 at 16:00;  Stop 2/16/17 at 15:02

;  Status DC


Sodium Chloride 1,000 ml @  1,000 mls/hr Q1H PRN IV hypotension;  Start 2/13/17 

at 08:48;  Stop 2/13/17 at 14:47;  Status DC


Sodium Chloride (Iv Sodium Chloride 0.9% 1000ml Bag) 1,000 ml @  400 mls/hr 

Q2H30M PRN IV PATENCY;  Start 2/13/17 at 08:48;  Stop 2/13/17 at 20:47;  Status 

DC


Info (PHARMACY MONITORING -- do not chart) 1 each PRN DAILY  PRN MC SEE COMMENTS

;  Start 2/13/17 at 09:00;  Status UNV


Lisinopril (Prinivil) 40 mg DAILY PO  Last administered on 2/25/17at 08:53;  

Start 2/14/17 at 09:00


Cyanocobalamin (Vitamin B-12) 1,000 mcg DAILY PO  Last administered on 2/25/ 17at 08:52;  Start 2/13/17 at 15:30


Ferrous Sulfate (Feosol) 325 mg BID PO  Last administered on 2/25/17at 08:52;  

Start 2/13/17 at 21:00


Heparin Sodium (Porcine) 10,000 unit STK-MED ONCE .ROUTE ;  Start 2/14/17 at 17:

14;  Stop 2/14/17 at 17:15;  Status DC


Lidocaine/ Epinephrine 20 ml 20 ml STK-MED ONCE .ROUTE ;  Start 2/14/17 at 17:14

;  Stop 2/14/17 at 17:15;  Status DC


Heparin Sodium/ Sodium Chloride 500 ml @  As Directed STK-MED ONCE .ROUTE ;  

Start 2/14/17 at 17:14;  Stop 2/14/17 at 17:15;  Status DC


Fentanyl Citrate (Fentanyl 2ml Vial) 100 mcg STK-MED ONCE .ROUTE ;  Start 2/14/ 17 at 17:35;  Stop 2/14/17 at 17:36;  Status DC


Midazolam HCl 2 mg 2 mg STK-MED ONCE .ROUTE ;  Start 2/14/17 at 17:35;  Stop 2/ 14/17 at 17:36;  Status DC


Cefazolin Sodium (Ancef 1gm Ivpb For Omni) 50 ml @ As Directed STK-MED ONCE IV 

;  Start 2/14/17 at 17:36;  Stop 2/14/17 at 17:37;  Status DC


Heparin Sodium/ Sodium Chloride 1,000 unit 1X  ONCE IART  Last administered on 2 /14/17at 18:04;  Start 2/14/17 at 18:00;  Stop 2/14/17 at 18:02;  Status DC


Midazolam HCl (Versed) 2 mg 1X  ONCE IV  Last administered on 2/14/17at 18:04;  

Start 2/14/17 at 18:00;  Stop 2/14/17 at 18:02;  Status DC


Fentanyl Citrate (Fentanyl 2ml Vial) 100 mcg 1X  ONCE IV  Last administered on 2 /14/17at 18:04;  Start 2/14/17 at 18:00;  Stop 2/14/17 at 18:02;  Status DC


Heparin Sodium (Porcine) 4300 unit 4,300 unit 1X  ONCE IV  Last administered on 

2/14/17at 18:05;  Start 2/14/17 at 18:00;  Stop 2/14/17 at 18:02;  Status DC


Cefazolin Sodium (Ancef 1gm Ivpb For Omni) 50 ml @  100 mls/hr 1X  ONCE IV  

Last administered on 2/14/17at 18:06;  Start 2/14/17 at 18:00;  Stop 2/14/17 at 

18:29;  Status DC


Lidocaine/ Epinephrine 10 ml 10 ml 1X  ONCE IJ  Last administered on 2/14/17at 

18:05;  Start 2/14/17 at 18:00;  Stop 2/14/17 at 18:02;  Status DC


Sodium Chloride 1,000 ml @  1,000 mls/hr Q1H PRN IV hypotension;  Start 2/15/17 

at 14:32;  Stop 2/15/17 at 20:31;  Status DC


Albumin Human (Albuminar) 200 ml @  200 mls/hr 1X PRN  PRN IV Hypotension;  

Start 2/15/17 at 14:45;  Stop 2/15/17 at 20:44;  Status DC


Sodium Chloride (Normal Saline Flush) 10 ml 1X PRN  PRN IV AP catheter pack;  

Start 2/15/17 at 14:45;  Stop 2/16/17 at 14:44;  Status DC


Sodium Chloride 10 ml 10 ml 1X PRN  PRN IV  catheter pack;  Start 2/15/17 at 

14:45;  Stop 2/16/17 at 14:44;  Status DC


Sodium Chloride (Iv Sodium Chloride 0.9% 1000ml Bag) 1,000 ml @  400 mls/hr 

Q2H30M PRN IV PATENCY;  Start 2/15/17 at 14:32;  Stop 2/16/17 at 02:31;  Status 

DC


Glimepiride 1 mg 1 mg DAILY PO  Last administered on 2/18/17at 09:28;  Start 2/ 16/17 at 17:00;  Stop 2/18/17 at 13:46;  Status DC


Sodium Chloride (Iv Sodium Chloride 0.9% 1000ml Bag) 1,000 ml @  1,000 mls/hr 

Q1H PRN IV hypotension;  Start 2/17/17 at 09:18;  Stop 2/17/17 at 15:17;  

Status DC


Acetaminophen (Tylenol) 500 mg 1X PRN  PRN PO MILD PAIN / TEMP;  Start 2/17/17 

at 09:30;  Stop 2/18/17 at 09:29;  Status DC


Diphenhydramine HCl (Benadryl) 25 mg 1X PRN  PRN IV ITCHING;  Start 2/17/17 at 

09:30;  Stop 2/18/17 at 09:29;  Status DC


Diphenhydramine HCl (Benadryl) 25 mg 1X PRN  PRN IV ITCHING;  Start 2/17/17 at 

09:30;  Stop 2/18/17 at 09:29;  Status DC


Sodium Chloride (Normal Saline Flush) 10 ml 1X PRN  PRN IV AP catheter pack;  

Start 2/17/17 at 09:30;  Stop 2/18/17 at 09:29;  Status DC


Sodium Chloride (Normal Saline Flush) 10 ml 1X PRN  PRN IV  catheter pack;  

Start 2/17/17 at 09:30;  Stop 2/18/17 at 09:29;  Status DC


Labetalol HCl 10 mg 10 mg PRN Q1HR  PRN IVP SBP > 180;  Start 2/17/17 at 09:30;

  Stop 2/18/17 at 09:29;  Status DC


Sodium Chloride (Iv Sodium Chloride 0.9% 1000ml Bag) 1,000 ml @  400 mls/hr 

Q2H30M PRN IV PATENCY;  Start 2/17/17 at 09:18;  Stop 2/17/17 at 21:17;  Status 

DC


Info (PHARMACY MONITORING -- do not chart) 1 each PRN DAILY  PRN MC SEE COMMENTS

;  Start 2/17/17 at 09:30;  Status UNV


Amlodipine Besylate (Norvasc) 5 mg DAILY PO  Last administered on 2/23/17at 09:

37;  Start 2/18/17 at 12:00;  Stop 2/23/17 at 10:23;  Status DC


Glimepiride (Amaryl) 2 mg DAILY PO  Last administered on 2/25/17at 08:53;  

Start 2/19/17 at 09:00


Insulin Detemir 10 units 10 units QHS SQ  Last administered on 2/24/17at 21:33;

  Start 2/19/17 at 21:00


Sodium Chloride (Iv Sodium Chloride 0.9% 1000ml Bag) 1,000 ml @  1,000 mls/hr 

Q1H PRN IV hypotension;  Start 2/20/17 at 13:02;  Stop 2/20/17 at 19:01;  

Status DC


Sodium Chloride (Normal Saline Flush) 10 ml 1X PRN  PRN IV AP catheter pack;  

Start 2/20/17 at 13:15;  Stop 2/21/17 at 13:14;  Status DC


Sodium Chloride (Normal Saline Flush) 10 ml 1X PRN  PRN IV  catheter pack;  

Start 2/20/17 at 13:15;  Stop 2/21/17 at 13:14;  Status DC


Info (PHARMACY MONITORING -- do not chart) 1 each PRN DAILY  PRN MC SEE COMMENTS

;  Start 2/20/17 at 13:15;  Status UNV


Info 1 each 1 each PRN DAILY  PRN MC SEE COMMENTS;  Start 2/20/17 at 13:15;  

Status UNV


Sodium Chloride (Iv Sodium Chloride 0.9% 1000ml Bag) 1,000 ml @  1,000 mls/hr 

Q1H PRN IV hypotension;  Start 2/22/17 at 07:54;  Stop 2/22/17 at 13:53;  

Status DC


Diphenhydramine HCl (Benadryl) 25 mg 1X PRN  PRN IV ITCHING;  Start 2/22/17 at 

08:00;  Stop 2/23/17 at 07:59;  Status DC


Diphenhydramine HCl (Benadryl) 25 mg 1X PRN  PRN IV ITCHING;  Start 2/22/17 at 

08:00;  Stop 2/23/17 at 07:59;  Status DC


Sodium Chloride (Normal Saline Flush) 10 ml 1X PRN  PRN IV AP catheter pack;  

Start 2/22/17 at 08:00;  Stop 2/23/17 at 07:59;  Status DC


Sodium Chloride (Normal Saline Flush) 10 ml 1X PRN  PRN IV  catheter pack;  

Start 2/22/17 at 08:00;  Stop 2/23/17 at 07:59;  Status DC


Labetalol HCl 10 mg 10 mg PRN Q1HR  PRN IVP SBP > 180 Last administered on 2/22/ 17at 11:04;  Start 2/22/17 at 08:00;  Stop 2/23/17 at 07:59;  Status DC


Sodium Chloride (Iv Sodium Chloride 0.9% 1000ml Bag) 1,000 ml @  400 mls/hr 

Q2H30M PRN IV PATENCY;  Start 2/22/17 at 07:54;  Stop 2/22/17 at 19:53;  Status 

DC


Info (PHARMACY MONITORING -- do not chart) 1 each PRN DAILY  PRN MC SEE COMMENTS

;  Start 2/22/17 at 08:00;  Status UNV


Info (PHARMACY MONITORING -- do not chart) 1 each PRN DAILY  PRN MC SEE COMMENTS

;  Start 2/22/17 at 08:00;  Status UNV


Amlodipine Besylate (Norvasc) 10 mg DAILY PO  Last administered on 2/25/17at 08:

52;  Start 2/23/17 at 11:00


Throat Lozenges 1 shahida 1 shahida PRN Q2HRS  PRN PO SORE THROAT Last administered on 2 /23/17at 21:01;  Start 2/23/17 at 11:15


Sodium Chloride (Iv Sodium Chloride 0.9% 1000ml Bag) 1,000 ml @  1,000 mls/hr 

Q1H PRN IV hypotension;  Start 2/24/17 at 07:44;  Stop 2/24/17 at 13:43;  

Status DC


Diphenhydramine HCl (Benadryl) 25 mg 1X PRN  PRN IV ITCHING;  Start 2/24/17 at 

07:45;  Stop 2/25/17 at 07:44;  Status DC


Diphenhydramine HCl (Benadryl) 25 mg 1X PRN  PRN IV ITCHING;  Start 2/24/17 at 

07:45;  Stop 2/25/17 at 07:44;  Status DC


Sodium Chloride (Normal Saline Flush) 10 ml 1X PRN  PRN IV AP catheter pack;  

Start 2/24/17 at 07:45;  Stop 2/25/17 at 07:44;  Status DC


Sodium Chloride (Normal Saline Flush) 10 ml 1X PRN  PRN IV  catheter pack;  

Start 2/24/17 at 07:45;  Stop 2/25/17 at 07:44;  Status DC


Labetalol HCl (Normodyne) 10 mg PRN Q1HR  PRN IVP SBP > 180 Last administered 

on 2/24/17at 10:56;  Start 2/24/17 at 07:45;  Stop 2/25/17 at 07:44;  Status DC


Clonidine HCl 0.1 mg 0.1 mg 1X PRN  PRN PO SBP > 180 Last administered on 2/24/ 17at 11:03;  Start 2/24/17 at 07:45;  Stop 2/25/17 at 07:44;  Status DC


Sodium Chloride (Iv Sodium Chloride 0.9% 1000ml Bag) 1,000 ml @  400 mls/hr 

Q2H30M PRN IV PATENCY;  Start 2/24/17 at 07:44;  Stop 2/24/17 at 19:43;  Status 

DC


Info (PHARMACY MONITORING -- do not chart) 1 each PRN DAILY  PRN MC SEE COMMENTS

;  Start 2/24/17 at 07:45;  Status UNV


Ibuprofen (Motrin) 400 mg PRN Q6HRS  PRN PO INFLAMMATION;  Start 2/24/17 at 22:

45





Active Scripts


Active


Lisinopril 20 Mg Tablet 20 Mg PO QHS


Isosorbide Mononitrate Er (Isosorbide Mononitrate) 30 Mg Tab.er.24h 30 Mg PO 

DAILY


Hydralazine Hcl 50 Mg Tablet 100 Mg PO TID


Carvedilol 12.5 Mg Tablet 25 Mg PO BIDWMEALS


Atorvastatin Calcium 40 Mg Tablet 80 Mg PO QHS


Aspirin Ec (Aspirin) 81 Mg Tablet.dr 81 Mg PO DAILYWBKFT


Amlodipine Besylate 5 Mg Tablet 5 Mg PO DAILYWLUN


Reported


Clopidogrel (Clopidogrel Bisulfate) 75 Mg Tablet 75 Mg PO DAILY


Glyburide 5 Mg Tablet 1 Tab PO BIDWMEALS


Lisinopril 20 Mg Tablet 1 Tab PO DAILY


Vitals/I & O





 Vital Sign - Last 24 Hours








 2/24/17 2/24/17 2/24/17 2/24/17





 11:52 11:53 11:53 11:54


 


Pulse 79 72 72 78


 


B/P 155/53 155/53 155/53 155/53





 2/24/17 2/24/17 2/24/17 2/24/17





 15:02 17:00 19:59 20:00


 


Temp 101.8  101.4 





 101.8  101.4 


 


Pulse 81 81 84 


 


Resp 19  18 


 


B/P 157/53 157/53 128/46 


 


Pulse Ox 94  93 


 


O2 Delivery Room Air  Room Air Room Air


 


    





    





 2/24/17 2/25/17 2/25/17 2/25/17





 23:39 03:20 07:00 08:00


 


Temp 100.8 99.7 101.8 





 100.8 99.7 101.8 


 


Pulse 73 76 79 


 


Resp 18 16 18 


 


B/P 131/48 131/48 140/54 


 


Pulse Ox 94 97 95 


 


O2 Delivery Room Air Room Air Room Air Room Air


 


    





    





 2/25/17 2/25/17 2/25/17 2/25/17





 08:52 08:53 08:53 08:54


 


Pulse 79 79 79 79


 


B/P 140/54 140/54 140/54 140/54





 2/25/17   





 10:56   


 


Temp 99.9   





 99.9   


 


Pulse 70   


 


Resp 18   


 


B/P 132/44   


 


Pulse Ox 93   


 


O2 Delivery Room Air   














 Intake and Output   


 


 2/24/17 2/24/17 2/25/17





 15:00 23:00 07:00


 


Intake Total  500 ml 100 ml


 


Output Total  200 ml 


 


Balance  300 ml 100 ml














BRAXTON HADLEY III DO Feb 25, 2017 11:27

## 2017-02-26 NOTE — PDOC
PROGRESS NOTES


Chief Complaint


Chief Complaint


1.  Acute on chronic systolic congestive heart failure. Stable


2.  Non-ST-segment elevation myocardial infarction.


3.  WALTER with CKD on HD


4.  Ischemic cardiomyopathy.


5.  Malignant hypertension, Better


6.  Anemia


7.  Hyperlipidemia.


8.  Psoriasis.


9. CAD


10. Anemia


11. UTI not POA


12. DM2


13. Fevers.





History of Present Illness


History of Present Illness


Patient awake, alert, and oriented when seen and examined this AM.  Pt states 

that she is doing "good".  Family member present and at bedside.  Pt denies any 

current


CP or SOA. No fevers of chills. 





VSS- All questions and concerns addressed and answered.





Vitals


Vitals





 Vital Signs








  Date Time  Temp Pulse Resp B/P Pulse Ox O2 Delivery O2 Flow Rate FiO2


 


2/26/17 10:29 98.9 66 18 110/53 93 Room Air  





 98.9       


 


2/25/17 20:00       2.0 











Physical Exam


General:  Alert, Oriented X3, Cooperative, No acute distress


Heart:  Regular rate, Normal S1, Normal S2, No murmurs, Gallops


Lungs:  Clear, Other


Abdomen:  Normal bowel sounds, Soft, No tenderness, No hepatosplenomegaly, No 

masses


Extremities:  No clubbing, No cyanosis, No edema, Normal pulses, No tenderness/

swelling


Skin:  No rashes, No breakdown, No significant lesion, Other (psoriasis )





Labs


LABS





Laboratory Tests








Test


  2/25/17


16:42 2/25/17


21:05 2/25/17


21:07 2/26/17


07:32


 


Glucose (Fingerstick)


  246mg/dL


(70-99) 405mg/dL


(70-99) 399mg/dL


(70-99) 223mg/dL


(70-99)














Test


  2/26/17


08:50 2/26/17


10:52 


  


 


 


White Blood Count


  4.3x10^3/uL


(4.0-11.0) 


  


  


 


 


Red Blood Count


  2.50x10^6/uL


(3.50-5.40) 


  


  


 


 


Hemoglobin


  7.6g/dL


(12.0-15.5) 


  


  


 


 


Hematocrit


  23.1%


(36.0-47.0) 


  


  


 


 


Mean Corpuscular Volume 92fL ()    


 


Mean Corpuscular Hemoglobin 30pg (25-35)    


 


Mean Corpuscular Hemoglobin


Concent 33g/dL (31-37) 


  


  


  


 


 


Red Cell Distribution Width


  14.1%


(11.5-14.5) 


  


  


 


 


Platelet Count


  178x10^3/uL


(140-400) 


  


  


 


 


Neutrophils (%) (Auto) 52% (31-73)    


 


Lymphocytes (%) (Auto) 32% (24-48)    


 


Monocytes (%) (Auto) 12% (0-9)    


 


Eosinophils (%) (Auto) 3% (0-3)    


 


Basophils (%) (Auto) 1% (0-3)    


 


Neutrophils # (Auto)


  2.2x10^3uL


(1.8-7.7) 


  


  


 


 


Lymphocytes # (Auto)


  1.4x10^3/uL


(1.0-4.8) 


  


  


 


 


Monocytes # (Auto)


  0.5x10^3/uL


(0.0-1.1) 


  


  


 


 


Eosinophils # (Auto)


  0.1x10^3/uL


(0.0-0.7) 


  


  


 


 


Basophils # (Auto)


  0.0x10^3/uL


(0.0-0.2) 


  


  


 


 


Sodium Level


  138mmol/L


(136-145) 


  


  


 


 


Potassium Level


  4.0mmol/L


(3.5-5.1) 


  


  


 


 


Chloride Level


  101mmol/L


() 


  


  


 


 


Carbon Dioxide Level


  27mmol/L


(21-32) 


  


  


 


 


Anion Gap 10 (6-14)    


 


Blood Urea Nitrogen 48mg/dL (7-20)    


 


Creatinine


  4.3mg/dL


(0.6-1.0) 


  


  


 


 


Estimated GFR


(Cockcroft-Gault) 10.5 


  


  


  


 


 


Glucose Level


  219mg/dL


(70-99) 


  


  


 


 


Calcium Level


  7.8mg/dL


(8.5-10.1) 


  


  


 


 


Glucose (Fingerstick)


  


  228mg/dL


(70-99) 


  


 











Review of Systems


Review of Systems


Complaint of hunger


Complaint of fatigue





Assessment and Plan


Assessmemt and Plan


 Problems


Medical Problems:


(1) Congestive heart failure


Status: Acute  





(2) NSTEMI (non-ST elevated myocardial infarction)


Status: Acute  





Assessment:





1.  Acute on chronic systolic congestive heart failure. Stable


2.  Non-ST-segment elevation myocardial infarction.


3.  WALTER with CKD on HD


4.  Ischemic cardiomyopathy.


5.  Malignant hypertension, Better


6.  Anemia


7.  Hyperlipidemia.


8.  Psoriasis.


9. CAD


10. Anemia


11. UTI not POA


12. DM2


13. Fevers.  





Plan:





Continue to monitor the patient per floor protocol


Continue daily labs- CBC, BMP, BUN, and Cr


Monitor daily Hgb


Continue home meds


Continue supportive care





Await  placement and HD setup


Encouraged medication compliance





Appreciate Nephrology input and recommendations





Discuss with floor RN and family


Problems:  





Comment


Review of Relevant


I have reviewed the following items lore (where applicable) has been applied.


Labs





Laboratory Tests








Test


  2/24/17


16:35 2/24/17


18:36 2/24/17


21:29 2/25/17


07:20


 


Glucose (Fingerstick)


  253mg/dL


(70-99) 


  264mg/dL


(70-99) 142mg/dL


(70-99)


 


Influenza Type A Antigen


  


  Negative


(NEGATIVE) 


  


 


 


Influenza Type B Antigen


  


  Negative


(NEGATIVE) 


  


 














Test


  2/25/17


11:56 2/25/17


16:42 2/25/17


21:05 2/25/17


21:07


 


Glucose (Fingerstick)


  201mg/dL


(70-99) 246mg/dL


(70-99) 405mg/dL


(70-99) 399mg/dL


(70-99)














Test


  2/26/17


07:32 2/26/17


08:50 2/26/17


10:52 


 


 


Glucose (Fingerstick)


  223mg/dL


(70-99) 


  228mg/dL


(70-99) 


 


 


White Blood Count


  


  4.3x10^3/uL


(4.0-11.0) 


  


 


 


Red Blood Count


  


  2.50x10^6/uL


(3.50-5.40) 


  


 


 


Hemoglobin


  


  7.6g/dL


(12.0-15.5) 


  


 


 


Hematocrit


  


  23.1%


(36.0-47.0) 


  


 


 


Mean Corpuscular Volume  92fL ()   


 


Mean Corpuscular Hemoglobin  30pg (25-35)   


 


Mean Corpuscular Hemoglobin


Concent 


  33g/dL (31-37) 


  


  


 


 


Red Cell Distribution Width


  


  14.1%


(11.5-14.5) 


  


 


 


Platelet Count


  


  178x10^3/uL


(140-400) 


  


 


 


Neutrophils (%) (Auto)  52% (31-73)   


 


Lymphocytes (%) (Auto)  32% (24-48)   


 


Monocytes (%) (Auto)  12% (0-9)   


 


Eosinophils (%) (Auto)  3% (0-3)   


 


Basophils (%) (Auto)  1% (0-3)   


 


Neutrophils # (Auto)


  


  2.2x10^3uL


(1.8-7.7) 


  


 


 


Lymphocytes # (Auto)


  


  1.4x10^3/uL


(1.0-4.8) 


  


 


 


Monocytes # (Auto)


  


  0.5x10^3/uL


(0.0-1.1) 


  


 


 


Eosinophils # (Auto)


  


  0.1x10^3/uL


(0.0-0.7) 


  


 


 


Basophils # (Auto)


  


  0.0x10^3/uL


(0.0-0.2) 


  


 


 


Sodium Level


  


  138mmol/L


(136-145) 


  


 


 


Potassium Level


  


  4.0mmol/L


(3.5-5.1) 


  


 


 


Chloride Level


  


  101mmol/L


() 


  


 


 


Carbon Dioxide Level


  


  27mmol/L


(21-32) 


  


 


 


Anion Gap  10 (6-14)   


 


Blood Urea Nitrogen  48mg/dL (7-20)   


 


Creatinine


  


  4.3mg/dL


(0.6-1.0) 


  


 


 


Estimated GFR


(Cockcroft-Gault) 


  10.5 


  


  


 


 


Glucose Level


  


  219mg/dL


(70-99) 


  


 


 


Calcium Level


  


  7.8mg/dL


(8.5-10.1) 


  


 








Laboratory Tests








Test


  2/25/17


16:42 2/25/17


21:05 2/25/17


21:07 2/26/17


07:32


 


Glucose (Fingerstick)


  246mg/dL


(70-99) 405mg/dL


(70-99) 399mg/dL


(70-99) 223mg/dL


(70-99)














Test


  2/26/17


08:50 2/26/17


10:52 


  


 


 


White Blood Count


  4.3x10^3/uL


(4.0-11.0) 


  


  


 


 


Red Blood Count


  2.50x10^6/uL


(3.50-5.40) 


  


  


 


 


Hemoglobin


  7.6g/dL


(12.0-15.5) 


  


  


 


 


Hematocrit


  23.1%


(36.0-47.0) 


  


  


 


 


Mean Corpuscular Volume 92fL ()    


 


Mean Corpuscular Hemoglobin 30pg (25-35)    


 


Mean Corpuscular Hemoglobin


Concent 33g/dL (31-37) 


  


  


  


 


 


Red Cell Distribution Width


  14.1%


(11.5-14.5) 


  


  


 


 


Platelet Count


  178x10^3/uL


(140-400) 


  


  


 


 


Neutrophils (%) (Auto) 52% (31-73)    


 


Lymphocytes (%) (Auto) 32% (24-48)    


 


Monocytes (%) (Auto) 12% (0-9)    


 


Eosinophils (%) (Auto) 3% (0-3)    


 


Basophils (%) (Auto) 1% (0-3)    


 


Neutrophils # (Auto)


  2.2x10^3uL


(1.8-7.7) 


  


  


 


 


Lymphocytes # (Auto)


  1.4x10^3/uL


(1.0-4.8) 


  


  


 


 


Monocytes # (Auto)


  0.5x10^3/uL


(0.0-1.1) 


  


  


 


 


Eosinophils # (Auto)


  0.1x10^3/uL


(0.0-0.7) 


  


  


 


 


Basophils # (Auto)


  0.0x10^3/uL


(0.0-0.2) 


  


  


 


 


Sodium Level


  138mmol/L


(136-145) 


  


  


 


 


Potassium Level


  4.0mmol/L


(3.5-5.1) 


  


  


 


 


Chloride Level


  101mmol/L


() 


  


  


 


 


Carbon Dioxide Level


  27mmol/L


(21-32) 


  


  


 


 


Anion Gap 10 (6-14)    


 


Blood Urea Nitrogen 48mg/dL (7-20)    


 


Creatinine


  4.3mg/dL


(0.6-1.0) 


  


  


 


 


Estimated GFR


(Cockcroft-Gault) 10.5 


  


  


  


 


 


Glucose Level


  219mg/dL


(70-99) 


  


  


 


 


Calcium Level


  7.8mg/dL


(8.5-10.1) 


  


  


 


 


Glucose (Fingerstick)


  


  228mg/dL


(70-99) 


  


 








Microbiology


2/10/17 Blood Culture - Final, Complete


          NO GROWTH AFTER 5 DAYS


2/23/17 Throat Culture - Final, Complete


          


2/23/17 - Final, Complete


          


2/12/17 Urine Culture - Final, Complete


          


2/12/17 Urine Culture Result 1 (SEAN) - Final, Complete


Medications





 Current Medications


Aspirin 324 mg 324 mg 1X  ONCE PO  Last administered on 2/7/17at 12:59;  Start 2 /7/17 at 12:45;  Stop 2/7/17 at 12:48;  Status DC


Heparin Sodium/ Dextrose 500 ml @  19 mls/hr CONT  PRN IV SEE I/O RECORD Last 

administered on 2/8/17at 14:45;  Start 2/7/17 at 12:45;  Stop 2/9/17 at 15:30;  

Status DC


Heparin Sodium (Porcine) 1,950 unit PRN Q6HRS  PRN IV FOR UFH LEVEL LESS THAN 

0.2 Last administered on 2/7/17at 13:01;  Start 2/7/17 at 12:45;  Stop 2/9/17 

at 15:30;  Status DC


Ondansetron HCl (Zofran) 4 mg PRN Q8HRS  PRN IV NAUSEA/VOMITING;  Start 2/7/17 

at 12:45;  Stop 2/8/17 at 12:44;  Status DC


Morphine Sulfate 4 mg PRN Q2HR  PRN IV PAIN Last administered on 2/7/17at 14:03

;  Start 2/7/17 at 12:45;  Stop 2/8/17 at 12:44;  Status DC


Nitroglycerin (Nitrostat) 0.4 mg PRN Q5MIN  PRN SL CHEST PAIN Last administered 

on 2/7/17at 14:03;  Start 2/7/17 at 12:45;  Stop 2/8/17 at 12:44;  Status DC


Amlodipine Besylate (Norvasc) 5 mg DAILYWLUN PO  Last administered on 2/8/17at 

08:32;  Start 2/7/17 at 14:00;  Stop 2/8/17 at 16:02;  Status DC


Aspirin (Ecotrin) 81 mg DAILYWBKFT PO  Last administered on 2/26/17at 09:06;  

Start 2/7/17 at 14:00


Atorvastatin Calcium (Lipitor) 80 mg QHS PO  Last administered on 2/25/17at 21:

04;  Start 2/7/17 at 21:00


Carvedilol (Coreg) 25 mg BIDWMEALS PO  Last administered on 2/26/17at 09:06;  

Start 2/7/17 at 17:00


Clopidogrel Bisulfate (Plavix) 75 mg DAILY PO  Last administered on 2/7/17at 14:

28;  Start 2/7/17 at 14:00;  Stop 2/8/17 at 08:41;  Status DC


Isosorbide Mononitrate (Imdur) 30 mg DAILY PO  Last administered on 2/26/17at 09

:06;  Start 2/7/17 at 14:00


Furosemide (Lasix) 40 mg 1X  ONCE IVP  Last administered on 2/7/17at 14:30;  

Start 2/7/17 at 14:00;  Stop 2/7/17 at 14:23;  Status DC


Furosemide 40 mg 40 mg 1X  ONCE IVP  Last administered on 2/7/17at 14:30;  

Start 2/7/17 at 14:30;  Stop 2/7/17 at 14:38;  Status DC


Nitroglycerin/ Dextrose (Nitroglycerin Drip) 250 ml @ 0 mls/hr CONT  PRN IV SEE 

I/O RECORD Last administered on 2/7/17at 15:23;  Start 2/7/17 at 14:30;  Stop 2/ 8/17 at 16:02;  Status DC


Acetaminophen (Tylenol) 325 mg PRN Q6HRS  PRN PO MILD PAIN / TEMP Last 

administered on 2/25/17at 17:38;  Start 2/7/17 at 15:00


Acetaminophen/ Hydrocodone Bitart (Lortab 5/325) 1 tab PRN Q6HRS  PRN PO 

MODERATE TO SEVERE PAIN Last administered on 2/15/17at 21:18;  Start 2/7/17 at 

15:00


Hydralazine HCl (Apresoline) 10 mg PRN Q4HRS  PRN IVP ELEVATED BP, SEE COMMENTS 

Last administered on 2/20/17at 11:16;  Start 2/7/17 at 15:00


Ondansetron HCl (Zofran) 4 mg PRN Q8HRS  PRN IV NAUSEA/VOMITING;  Start 2/7/17 

at 15:00


Albuterol Sulfate (Ventolin Neb Soln) 2.5 mg PRN Q4HRS  PRN NEB SHORTNESS OF 

BREATH;  Start 2/7/17 at 15:00


Insulin Aspart (Novolog) 0-7 UNITS TIDWMEALS SQ  Last administered on 2/26/17at 

12:30;  Start 2/7/17 at 17:00


Dextrose 12.5 gm PRN Q15MIN  PRN IV SEE COMMENTS;  Start 2/7/17 at 15:00


Info 1 each 1 each PRN DAILY  PRN MC SEE COMMENTS Last administered on 2/9/17at 

09:38;  Start 2/7/17 at 17:00;  Stop 2/10/17 at 08:25;  Status DC


Magnesium Sulfate/ Dextrose (Magnesium Sulfate PREMIX 2GM) 50 ml @ 25 mls/hr 

PRN DAILY  PRN IV for Mag < 1.7 on am labs;  Start 2/8/17 at 11:15


Lidocaine/Sodium Bicarbonate (Buffered Lidocaine 1%) 3 ml 1X  ONCE IJ  Last 

administered on 2/8/17at 14:03;  Start 2/8/17 at 12:30;  Stop 2/8/17 at 12:34;  

Status DC


Heparin Sodium (Porcine) 2,400 unit 1X  ONCE INT CAT  Last administered on 2/8/ 17at 14:04;  Start 2/8/17 at 12:30;  Stop 2/8/17 at 12:34;  Status DC


Heparin Sodium/ Sodium Chloride 60 unit 1X  ONCE IV  Last administered on 2/8/ 17at 14:04;  Start 2/8/17 at 12:30;  Stop 2/8/17 at 12:34;  Status DC


Lisinopril (Prinivil) 20 mg DAILY PO  Last administered on 2/12/17at 09:22;  

Start 2/8/17 at 16:00;  Stop 2/13/17 at 10:31;  Status DC


Hydralazine HCl 50 mg 50 mg BID PO  Last administered on 2/12/17at 20:55;  

Start 2/8/17 at 21:00;  Stop 2/13/17 at 10:31;  Status DC


Sodium Chloride (Iv Sodium Chloride 0.9% 1000ml Bag) 1,000 ml @  1,000 mls/hr 

Q1H PRN IV hypotension;  Start 2/8/17 at 18:28;  Stop 2/9/17 at 00:27;  Status 

DC


Diphenhydramine HCl (Benadryl) 25 mg 1X PRN  PRN IV ITCHING;  Start 2/8/17 at 18

:30;  Stop 2/9/17 at 18:29;  Status DC


Diphenhydramine HCl (Benadryl) 25 mg 1X PRN  PRN IV ITCHING;  Start 2/8/17 at 18

:30;  Stop 2/9/17 at 18:29;  Status DC


Sodium Chloride (Normal Saline Flush) 10 ml 1X PRN  PRN IV AP catheter pack;  

Start 2/8/17 at 18:30;  Stop 2/9/17 at 18:29;  Status DC


Sodium Chloride 10 ml 10 ml 1X PRN  PRN IV  catheter pack;  Start 2/8/17 at 18

:30;  Stop 2/9/17 at 18:29;  Status DC


Sodium Chloride (Iv Sodium Chloride 0.9% 1000ml Bag) 1,000 ml @  400 mls/hr 

Q2H30M PRN IV PATENCY;  Start 2/8/17 at 18:28;  Stop 2/9/17 at 06:27;  Status DC


Info (PHARMACY MONITORING -- do not chart) 1 each PRN DAILY  PRN MC SEE COMMENTS

;  Start 2/8/17 at 18:30


Info (PHARMACY MONITORING -- do not chart) 1 each PRN DAILY  PRN MC SEE COMMENTS

;  Start 2/9/17 at 10:30;  Status UNV


Info (PHARMACY MONITORING -- do not chart) 1 each PRN DAILY  PRN MC SEE COMMENTS

;  Start 2/9/17 at 10:30;  Status Cancel


Darbepoetin Valeriano (Aranesp) 60 mcg WEEKLYHS SQ  Last administered on 2/23/17at 21

:04;  Start 2/9/17 at 21:00


Clopidogrel Bisulfate (Plavix) 75 mg DAILYWBKFT PO ;  Start 2/10/17 at 08:00;  

Stop 2/10/17 at 08:00;  Status DC


Clopidogrel Bisulfate 75 mg 75 mg DAILYWBKFT PO  Last administered on 2/10/17at 

08:25;  Start 2/9/17 at 15:30;  Stop 2/11/17 at 18:54;  Status DC


Sodium Chloride (Iv Sodium Chloride 0.9% 1000ml Bag) 1,000 ml @  1,000 mls/hr 

Q1H PRN IV hypotension;  Start 2/10/17 at 08:11;  Stop 2/10/17 at 15:00;  

Status DC


Diphenhydramine HCl (Benadryl) 25 mg 1X PRN  PRN IV ITCHING;  Start 2/10/17 at 

08:15;  Stop 2/10/17 at 15:00;  Status DC


Diphenhydramine HCl (Benadryl) 25 mg 1X PRN  PRN IV ITCHING;  Start 2/10/17 at 

08:15;  Stop 2/10/17 at 15:00;  Status DC


Sodium Chloride (Normal Saline Flush) 10 ml 1X PRN  PRN IV AP catheter pack;  

Start 2/10/17 at 08:15;  Stop 2/10/17 at 15:00;  Status DC


Sodium Chloride 10 ml 10 ml 1X PRN  PRN IV  catheter pack;  Start 2/10/17 at 

08:15;  Stop 2/10/17 at 15:00;  Status DC


Sodium Chloride (Iv Sodium Chloride 0.9% 1000ml Bag) 1,000 ml @  400 mls/hr 

Q2H30M PRN IV PATENCY;  Start 2/10/17 at 08:11;  Stop 2/10/17 at 21:00;  Status 

DC


Info 1 each 1 each PRN DAILY  PRN MC SEE COMMENTS;  Start 2/10/17 at 08:15;  

Status UNV


Magnesium Sulfate/ Dextrose 100 ml @  100 mls/hr 1X  ONCE IV  Last administered 

on 2/10/17at 12:36;  Start 2/10/17 at 10:30;  Stop 2/10/17 at 11:29;  Status DC


Ceftriaxone Sodium 1 gm/ Sodium Chloride 50 ml @  100 mls/hr Q24H IV  Last 

administered on 2/15/17at 17:35;  Start 2/12/17 at 16:00;  Stop 2/16/17 at 15:02

;  Status DC


Sodium Chloride 1,000 ml @  1,000 mls/hr Q1H PRN IV hypotension;  Start 2/13/17 

at 08:48;  Stop 2/13/17 at 14:47;  Status DC


Sodium Chloride (Iv Sodium Chloride 0.9% 1000ml Bag) 1,000 ml @  400 mls/hr 

Q2H30M PRN IV PATENCY;  Start 2/13/17 at 08:48;  Stop 2/13/17 at 20:47;  Status 

DC


Info (PHARMACY MONITORING -- do not chart) 1 each PRN DAILY  PRN MC SEE COMMENTS

;  Start 2/13/17 at 09:00;  Status UNV


Lisinopril (Prinivil) 40 mg DAILY PO  Last administered on 2/26/17at 09:07;  

Start 2/14/17 at 09:00


Cyanocobalamin (Vitamin B-12) 1,000 mcg DAILY PO  Last administered on 2/26/ 17at 09:07;  Start 2/13/17 at 15:30


Ferrous Sulfate (Feosol) 325 mg BID PO  Last administered on 2/26/17at 09:05;  

Start 2/13/17 at 21:00


Heparin Sodium (Porcine) 10,000 unit STK-MED ONCE .ROUTE ;  Start 2/14/17 at 17:

14;  Stop 2/14/17 at 17:15;  Status DC


Lidocaine/ Epinephrine 20 ml 20 ml STK-MED ONCE .ROUTE ;  Start 2/14/17 at 17:14

;  Stop 2/14/17 at 17:15;  Status DC


Heparin Sodium/ Sodium Chloride 500 ml @  As Directed STK-MED ONCE .ROUTE ;  

Start 2/14/17 at 17:14;  Stop 2/14/17 at 17:15;  Status DC


Fentanyl Citrate (Fentanyl 2ml Vial) 100 mcg STK-MED ONCE .ROUTE ;  Start 2/14/ 17 at 17:35;  Stop 2/14/17 at 17:36;  Status DC


Midazolam HCl 2 mg 2 mg STK-MED ONCE .ROUTE ;  Start 2/14/17 at 17:35;  Stop 2/ 14/17 at 17:36;  Status DC


Cefazolin Sodium (Ancef 1gm Ivpb For Omni) 50 ml @ As Directed STK-MED ONCE IV 

;  Start 2/14/17 at 17:36;  Stop 2/14/17 at 17:37;  Status DC


Heparin Sodium/ Sodium Chloride 1,000 unit 1X  ONCE IART  Last administered on 2 /14/17at 18:04;  Start 2/14/17 at 18:00;  Stop 2/14/17 at 18:02;  Status DC


Midazolam HCl (Versed) 2 mg 1X  ONCE IV  Last administered on 2/14/17at 18:04;  

Start 2/14/17 at 18:00;  Stop 2/14/17 at 18:02;  Status DC


Fentanyl Citrate (Fentanyl 2ml Vial) 100 mcg 1X  ONCE IV  Last administered on 2 /14/17at 18:04;  Start 2/14/17 at 18:00;  Stop 2/14/17 at 18:02;  Status DC


Heparin Sodium (Porcine) 4300 unit 4,300 unit 1X  ONCE IV  Last administered on 

2/14/17at 18:05;  Start 2/14/17 at 18:00;  Stop 2/14/17 at 18:02;  Status DC


Cefazolin Sodium (Ancef 1gm Ivpb For Omni) 50 ml @  100 mls/hr 1X  ONCE IV  

Last administered on 2/14/17at 18:06;  Start 2/14/17 at 18:00;  Stop 2/14/17 at 

18:29;  Status DC


Lidocaine/ Epinephrine 10 ml 10 ml 1X  ONCE IJ  Last administered on 2/14/17at 

18:05;  Start 2/14/17 at 18:00;  Stop 2/14/17 at 18:02;  Status DC


Sodium Chloride 1,000 ml @  1,000 mls/hr Q1H PRN IV hypotension;  Start 2/15/17 

at 14:32;  Stop 2/15/17 at 20:31;  Status DC


Albumin Human (Albuminar) 200 ml @  200 mls/hr 1X PRN  PRN IV Hypotension;  

Start 2/15/17 at 14:45;  Stop 2/15/17 at 20:44;  Status DC


Sodium Chloride (Normal Saline Flush) 10 ml 1X PRN  PRN IV AP catheter pack;  

Start 2/15/17 at 14:45;  Stop 2/16/17 at 14:44;  Status DC


Sodium Chloride 10 ml 10 ml 1X PRN  PRN IV  catheter pack;  Start 2/15/17 at 

14:45;  Stop 2/16/17 at 14:44;  Status DC


Sodium Chloride (Iv Sodium Chloride 0.9% 1000ml Bag) 1,000 ml @  400 mls/hr 

Q2H30M PRN IV PATENCY;  Start 2/15/17 at 14:32;  Stop 2/16/17 at 02:31;  Status 

DC


Glimepiride 1 mg 1 mg DAILY PO  Last administered on 2/18/17at 09:28;  Start 2/ 16/17 at 17:00;  Stop 2/18/17 at 13:46;  Status DC


Sodium Chloride (Iv Sodium Chloride 0.9% 1000ml Bag) 1,000 ml @  1,000 mls/hr 

Q1H PRN IV hypotension;  Start 2/17/17 at 09:18;  Stop 2/17/17 at 15:17;  

Status DC


Acetaminophen (Tylenol) 500 mg 1X PRN  PRN PO MILD PAIN / TEMP;  Start 2/17/17 

at 09:30;  Stop 2/18/17 at 09:29;  Status DC


Diphenhydramine HCl (Benadryl) 25 mg 1X PRN  PRN IV ITCHING;  Start 2/17/17 at 

09:30;  Stop 2/18/17 at 09:29;  Status DC


Diphenhydramine HCl (Benadryl) 25 mg 1X PRN  PRN IV ITCHING;  Start 2/17/17 at 

09:30;  Stop 2/18/17 at 09:29;  Status DC


Sodium Chloride (Normal Saline Flush) 10 ml 1X PRN  PRN IV AP catheter pack;  

Start 2/17/17 at 09:30;  Stop 2/18/17 at 09:29;  Status DC


Sodium Chloride (Normal Saline Flush) 10 ml 1X PRN  PRN IV  catheter pack;  

Start 2/17/17 at 09:30;  Stop 2/18/17 at 09:29;  Status DC


Labetalol HCl 10 mg 10 mg PRN Q1HR  PRN IVP SBP > 180;  Start 2/17/17 at 09:30;

  Stop 2/18/17 at 09:29;  Status DC


Sodium Chloride (Iv Sodium Chloride 0.9% 1000ml Bag) 1,000 ml @  400 mls/hr 

Q2H30M PRN IV PATENCY;  Start 2/17/17 at 09:18;  Stop 2/17/17 at 21:17;  Status 

DC


Info (PHARMACY MONITORING -- do not chart) 1 each PRN DAILY  PRN MC SEE COMMENTS

;  Start 2/17/17 at 09:30;  Status UNV


Amlodipine Besylate (Norvasc) 5 mg DAILY PO  Last administered on 2/23/17at 09:

37;  Start 2/18/17 at 12:00;  Stop 2/23/17 at 10:23;  Status DC


Glimepiride (Amaryl) 2 mg DAILY PO  Last administered on 2/26/17at 09:05;  

Start 2/19/17 at 09:00


Insulin Detemir 10 units 10 units QHS SQ  Last administered on 2/25/17at 21:12;

  Start 2/19/17 at 21:00


Sodium Chloride (Iv Sodium Chloride 0.9% 1000ml Bag) 1,000 ml @  1,000 mls/hr 

Q1H PRN IV hypotension;  Start 2/20/17 at 13:02;  Stop 2/20/17 at 19:01;  

Status DC


Sodium Chloride (Normal Saline Flush) 10 ml 1X PRN  PRN IV AP catheter pack;  

Start 2/20/17 at 13:15;  Stop 2/21/17 at 13:14;  Status DC


Sodium Chloride (Normal Saline Flush) 10 ml 1X PRN  PRN IV  catheter pack;  

Start 2/20/17 at 13:15;  Stop 2/21/17 at 13:14;  Status DC


Info (PHARMACY MONITORING -- do not chart) 1 each PRN DAILY  PRN MC SEE COMMENTS

;  Start 2/20/17 at 13:15;  Status UNV


Info 1 each 1 each PRN DAILY  PRN MC SEE COMMENTS;  Start 2/20/17 at 13:15;  

Status UNV


Sodium Chloride (Iv Sodium Chloride 0.9% 1000ml Bag) 1,000 ml @  1,000 mls/hr 

Q1H PRN IV hypotension;  Start 2/22/17 at 07:54;  Stop 2/22/17 at 13:53;  

Status DC


Diphenhydramine HCl (Benadryl) 25 mg 1X PRN  PRN IV ITCHING;  Start 2/22/17 at 

08:00;  Stop 2/23/17 at 07:59;  Status DC


Diphenhydramine HCl (Benadryl) 25 mg 1X PRN  PRN IV ITCHING;  Start 2/22/17 at 

08:00;  Stop 2/23/17 at 07:59;  Status DC


Sodium Chloride (Normal Saline Flush) 10 ml 1X PRN  PRN IV AP catheter pack;  

Start 2/22/17 at 08:00;  Stop 2/23/17 at 07:59;  Status DC


Sodium Chloride (Normal Saline Flush) 10 ml 1X PRN  PRN IV  catheter pack;  

Start 2/22/17 at 08:00;  Stop 2/23/17 at 07:59;  Status DC


Labetalol HCl 10 mg 10 mg PRN Q1HR  PRN IVP SBP > 180 Last administered on 2/22/ 17at 11:04;  Start 2/22/17 at 08:00;  Stop 2/23/17 at 07:59;  Status DC


Sodium Chloride (Iv Sodium Chloride 0.9% 1000ml Bag) 1,000 ml @  400 mls/hr 

Q2H30M PRN IV PATENCY;  Start 2/22/17 at 07:54;  Stop 2/22/17 at 19:53;  Status 

DC


Info (PHARMACY MONITORING -- do not chart) 1 each PRN DAILY  PRN MC SEE COMMENTS

;  Start 2/22/17 at 08:00;  Status UNV


Info (PHARMACY MONITORING -- do not chart) 1 each PRN DAILY  PRN MC SEE COMMENTS

;  Start 2/22/17 at 08:00;  Status UNV


Amlodipine Besylate (Norvasc) 10 mg DAILY PO  Last administered on 2/26/17at 09:

06;  Start 2/23/17 at 11:00


Throat Lozenges 1 shahida 1 shahida PRN Q2HRS  PRN PO SORE THROAT Last administered on 2 /23/17at 21:01;  Start 2/23/17 at 11:15


Sodium Chloride (Iv Sodium Chloride 0.9% 1000ml Bag) 1,000 ml @  1,000 mls/hr 

Q1H PRN IV hypotension;  Start 2/24/17 at 07:44;  Stop 2/24/17 at 13:43;  

Status DC


Diphenhydramine HCl (Benadryl) 25 mg 1X PRN  PRN IV ITCHING;  Start 2/24/17 at 

07:45;  Stop 2/25/17 at 07:44;  Status DC


Diphenhydramine HCl (Benadryl) 25 mg 1X PRN  PRN IV ITCHING;  Start 2/24/17 at 

07:45;  Stop 2/25/17 at 07:44;  Status DC


Sodium Chloride (Normal Saline Flush) 10 ml 1X PRN  PRN IV AP catheter pack;  

Start 2/24/17 at 07:45;  Stop 2/25/17 at 07:44;  Status DC


Sodium Chloride (Normal Saline Flush) 10 ml 1X PRN  PRN IV  catheter pack;  

Start 2/24/17 at 07:45;  Stop 2/25/17 at 07:44;  Status DC


Labetalol HCl (Normodyne) 10 mg PRN Q1HR  PRN IVP SBP > 180 Last administered 

on 2/24/17at 10:56;  Start 2/24/17 at 07:45;  Stop 2/25/17 at 07:44;  Status DC


Clonidine HCl 0.1 mg 0.1 mg 1X PRN  PRN PO SBP > 180 Last administered on 2/24/ 17at 11:03;  Start 2/24/17 at 07:45;  Stop 2/25/17 at 07:44;  Status DC


Sodium Chloride (Iv Sodium Chloride 0.9% 1000ml Bag) 1,000 ml @  400 mls/hr 

Q2H30M PRN IV PATENCY;  Start 2/24/17 at 07:44;  Stop 2/24/17 at 19:43;  Status 

DC


Info (PHARMACY MONITORING -- do not chart) 1 each PRN DAILY  PRN MC SEE COMMENTS

;  Start 2/24/17 at 07:45;  Status UNV


Ibuprofen (Motrin) 400 mg PRN Q6HRS  PRN PO INFLAMMATION Last administered on 2/ 25/17at 11:58;  Start 2/24/17 at 22:45





Active Scripts


Active


Lisinopril 20 Mg Tablet 20 Mg PO QHS


Isosorbide Mononitrate Er (Isosorbide Mononitrate) 30 Mg Tab.er.24h 30 Mg PO 

DAILY


Hydralazine Hcl 50 Mg Tablet 100 Mg PO TID


Carvedilol 12.5 Mg Tablet 25 Mg PO BIDWMEALS


Atorvastatin Calcium 40 Mg Tablet 80 Mg PO QHS


Aspirin Ec (Aspirin) 81 Mg Tablet.dr 81 Mg PO DAILYWBKFT


Amlodipine Besylate 5 Mg Tablet 5 Mg PO DAILYWLUN


Reported


Clopidogrel (Clopidogrel Bisulfate) 75 Mg Tablet 75 Mg PO DAILY


Glyburide 5 Mg Tablet 1 Tab PO BIDWMEALS


Lisinopril 20 Mg Tablet 1 Tab PO DAILY


Vitals/I & O





 Vital Sign - Last 24 Hours








 2/25/17 2/25/17 2/25/17 2/25/17





 15:11 17:44 19:49 20:00


 


Temp 99.6  98.7 





 99.6  98.7 


 


Pulse 65 65 66 


 


Resp 18  18 


 


B/P 120/44 120/44 130/41 


 


Pulse Ox 94  92 


 


O2 Delivery Room Air  Room Air Room Air


 


O2 Flow Rate    2.0


 


    





    





 2/25/17 2/26/17 2/26/17 2/26/17





 23:39 03:45 07:33 08:00


 


Temp 98.7 98.2 99.0 





 98.7 98.2 99.0 


 


Pulse 69 66 65 


 


Resp 21 20 20 


 


B/P 137/48 150/57 139/57 


 


Pulse Ox 92 95 94 


 


O2 Delivery Room Air Room Air Room Air Room Air


 


    





    





 2/26/17 2/26/17 2/26/17 2/26/17





 09:06 09:06 09:06 09:07


 


Pulse 65 65 65 65


 


B/P 139/57 139/57 139/57 139/57





 2/26/17   





 10:29   


 


Temp 98.9   





 98.9   


 


Pulse 66   


 


Resp 18   


 


B/P 110/53   


 


Pulse Ox 93   


 


O2 Delivery Room Air   














 Intake and Output   


 


 2/25/17 2/25/17 2/26/17





 15:00 23:00 07:00


 


Intake Total 240 ml  100 ml


 


Output Total   150 ml


 


Balance 240 ml  -50 ml














BRAXTON HADLEY III DO Feb 26, 2017 13:32

## 2017-02-26 NOTE — PDOC
Provider Note


Provider Note


RENAL F/U : [facility]





DOing OK


VSS


Afenrile


No new issues


Exam stable


Labs essentially unchanged.


HD in am.


CPM








ANDRIY ROSE MD Feb 26, 2017 23:42

## 2017-02-27 NOTE — PDOC
PROGRESS NOTES


Chief Complaint


Chief Complaint


1.  Acute on chronic systolic congestive heart failure. Stable


2.  Non-ST-segment elevation myocardial infarction.


3.  WALTER with CKD on HD


4.  Ischemic cardiomyopathy.


5.  Malignant hypertension, Better


6.  Anemia


7.  Hyperlipidemia.


8.  Psoriasis.


9. CAD


10. Anemia


11. UTI not POA


12. DM2


13. Fevers.





History of Present Illness


History of Present Illness


Pt receiving dialysis when seen this AM.  Family member present and at bedside.

  Pt denies any current CP or SOA. Denies any fevers or chills.





VSS- All questions and concerns answered and addressed.





Vitals


Vitals





 Vital Signs








  Date Time  Temp Pulse Resp B/P Pulse Ox O2 Delivery O2 Flow Rate FiO2


 


2/27/17 08:00      Room Air  


 


2/27/17 07:20 98.7 65 16 137/51 99   





 98.7       


 


2/26/17 20:00       2.0 











Physical Exam


General:  Alert, Oriented X3, Cooperative, No acute distress


Heart:  Regular rate, Normal S1, Normal S2, No murmurs, Gallops


Lungs:  Clear, Other


Abdomen:  Normal bowel sounds, Soft, No tenderness, No hepatosplenomegaly, No 

masses


Extremities:  No clubbing, No cyanosis, No edema, Normal pulses, No tenderness/

swelling


Skin:  No rashes, No breakdown, No significant lesion, Other (psoriasis )





Labs


LABS





Laboratory Tests








Test


  2/26/17


10:52 2/26/17


17:03 2/26/17


20:51 2/27/17


04:50


 


Glucose (Fingerstick)


  228mg/dL


(70-99) 219mg/dL


(70-99) 276mg/dL


(70-99) 


 


 


White Blood Count


  


  


  


  3.9x10^3/uL


(4.0-11.0)


 


Red Blood Count


  


  


  


  2.48x10^6/uL


(3.50-5.40)


 


Hemoglobin


  


  


  


  7.6g/dL


(12.0-15.5)


 


Hematocrit


  


  


  


  22.8%


(36.0-47.0)


 


Mean Corpuscular Volume    92fL () 


 


Mean Corpuscular Hemoglobin    31pg (25-35) 


 


Mean Corpuscular Hemoglobin


Concent 


  


  


  33g/dL (31-37) 


 


 


Red Cell Distribution Width


  


  


  


  13.9%


(11.5-14.5)


 


Platelet Count


  


  


  


  174x10^3/uL


(140-400)


 


Neutrophils (%) (Auto)    48% (31-73) 


 


Lymphocytes (%) (Auto)    36% (24-48) 


 


Monocytes (%) (Auto)    10% (0-9) 


 


Eosinophils (%) (Auto)    6% (0-3) 


 


Basophils (%) (Auto)    1% (0-3) 


 


Neutrophils # (Auto)


  


  


  


  1.9x10^3uL


(1.8-7.7)


 


Lymphocytes # (Auto)


  


  


  


  1.4x10^3/uL


(1.0-4.8)


 


Monocytes # (Auto)


  


  


  


  0.4x10^3/uL


(0.0-1.1)


 


Eosinophils # (Auto)


  


  


  


  0.2x10^3/uL


(0.0-0.7)


 


Basophils # (Auto)


  


  


  


  0.0x10^3/uL


(0.0-0.2)


 


Sodium Level


  


  


  


  140mmol/L


(136-145)


 


Potassium Level


  


  


  


  3.8mmol/L


(3.5-5.1)


 


Chloride Level


  


  


  


  102mmol/L


()


 


Carbon Dioxide Level


  


  


  


  26mmol/L


(21-32)


 


Anion Gap    12 (6-14) 


 


Blood Urea Nitrogen    58mg/dL (7-20) 


 


Creatinine


  


  


  


  4.5mg/dL


(0.6-1.0)


 


Estimated GFR


(Cockcroft-Gault) 


  


  


  10.0 


 


 


Glucose Level


  


  


  


  228mg/dL


(70-99)


 


Calcium Level


  


  


  


  7.6mg/dL


(8.5-10.1)














Test


  2/27/17


07:45 


  


  


 


 


Glucose (Fingerstick)


  154mg/dL


(70-99) 


  


  


 











Review of Systems


Review of Systems


Complaint of hunger


Complaint of weakness





Assessment and Plan


Assessmemt and Plan


 Problems


Medical Problems:


(1) Congestive heart failure


Status: Acute  





(2) NSTEMI (non-ST elevated myocardial infarction)


Status: Acute  





Assessment:





1.  Acute on chronic systolic congestive heart failure. Stable


2.  Non-ST-segment elevation myocardial infarction.


3.  WALTER with CKD on HD


4.  Ischemic cardiomyopathy.


5.  Malignant hypertension, Better


6.  Anemia


7.  Hyperlipidemia.


8.  Psoriasis.


9. CAD


10. Anemia


11. UTI not POA


12. DM2


13. Fevers. 





Plan:





Continue to monitor the patient per floor protocol


Contine to monitor renal function and labs


Labs reviewed


Continue HD


Possible DC later today


Appreciate Renal input and recommendations





Discussed with 


Discussed with floor nurse


Problems:  





Comment


Review of Relevant


I have reviewed the following items lore (where applicable) has been applied.


Labs





Laboratory Tests








Test


  2/25/17


11:56 2/25/17


16:42 2/25/17


21:05 2/25/17


21:07


 


Glucose (Fingerstick)


  201mg/dL


(70-99) 246mg/dL


(70-99) 405mg/dL


(70-99) 399mg/dL


(70-99)














Test


  2/26/17


07:32 2/26/17


08:50 2/26/17


10:52 2/26/17


17:03


 


Glucose (Fingerstick)


  223mg/dL


(70-99) 


  228mg/dL


(70-99) 219mg/dL


(70-99)


 


White Blood Count


  


  4.3x10^3/uL


(4.0-11.0) 


  


 


 


Red Blood Count


  


  2.50x10^6/uL


(3.50-5.40) 


  


 


 


Hemoglobin


  


  7.6g/dL


(12.0-15.5) 


  


 


 


Hematocrit


  


  23.1%


(36.0-47.0) 


  


 


 


Mean Corpuscular Volume  92fL ()   


 


Mean Corpuscular Hemoglobin  30pg (25-35)   


 


Mean Corpuscular Hemoglobin


Concent 


  33g/dL (31-37) 


  


  


 


 


Red Cell Distribution Width


  


  14.1%


(11.5-14.5) 


  


 


 


Platelet Count


  


  178x10^3/uL


(140-400) 


  


 


 


Neutrophils (%) (Auto)  52% (31-73)   


 


Lymphocytes (%) (Auto)  32% (24-48)   


 


Monocytes (%) (Auto)  12% (0-9)   


 


Eosinophils (%) (Auto)  3% (0-3)   


 


Basophils (%) (Auto)  1% (0-3)   


 


Neutrophils # (Auto)


  


  2.2x10^3uL


(1.8-7.7) 


  


 


 


Lymphocytes # (Auto)


  


  1.4x10^3/uL


(1.0-4.8) 


  


 


 


Monocytes # (Auto)


  


  0.5x10^3/uL


(0.0-1.1) 


  


 


 


Eosinophils # (Auto)


  


  0.1x10^3/uL


(0.0-0.7) 


  


 


 


Basophils # (Auto)


  


  0.0x10^3/uL


(0.0-0.2) 


  


 


 


Sodium Level


  


  138mmol/L


(136-145) 


  


 


 


Potassium Level


  


  4.0mmol/L


(3.5-5.1) 


  


 


 


Chloride Level


  


  101mmol/L


() 


  


 


 


Carbon Dioxide Level


  


  27mmol/L


(21-32) 


  


 


 


Anion Gap  10 (6-14)   


 


Blood Urea Nitrogen  48mg/dL (7-20)   


 


Creatinine


  


  4.3mg/dL


(0.6-1.0) 


  


 


 


Estimated GFR


(Cockcroft-Gault) 


  10.5 


  


  


 


 


Glucose Level


  


  219mg/dL


(70-99) 


  


 


 


Calcium Level


  


  7.8mg/dL


(8.5-10.1) 


  


 














Test


  2/26/17


20:51 2/27/17


04:50 2/27/17


07:45 


 


 


Glucose (Fingerstick)


  276mg/dL


(70-99) 


  154mg/dL


(70-99) 


 


 


White Blood Count


  


  3.9x10^3/uL


(4.0-11.0) 


  


 


 


Red Blood Count


  


  2.48x10^6/uL


(3.50-5.40) 


  


 


 


Hemoglobin


  


  7.6g/dL


(12.0-15.5) 


  


 


 


Hematocrit


  


  22.8%


(36.0-47.0) 


  


 


 


Mean Corpuscular Volume  92fL ()   


 


Mean Corpuscular Hemoglobin  31pg (25-35)   


 


Mean Corpuscular Hemoglobin


Concent 


  33g/dL (31-37) 


  


  


 


 


Red Cell Distribution Width


  


  13.9%


(11.5-14.5) 


  


 


 


Platelet Count


  


  174x10^3/uL


(140-400) 


  


 


 


Neutrophils (%) (Auto)  48% (31-73)   


 


Lymphocytes (%) (Auto)  36% (24-48)   


 


Monocytes (%) (Auto)  10% (0-9)   


 


Eosinophils (%) (Auto)  6% (0-3)   


 


Basophils (%) (Auto)  1% (0-3)   


 


Neutrophils # (Auto)


  


  1.9x10^3uL


(1.8-7.7) 


  


 


 


Lymphocytes # (Auto)


  


  1.4x10^3/uL


(1.0-4.8) 


  


 


 


Monocytes # (Auto)


  


  0.4x10^3/uL


(0.0-1.1) 


  


 


 


Eosinophils # (Auto)


  


  0.2x10^3/uL


(0.0-0.7) 


  


 


 


Basophils # (Auto)


  


  0.0x10^3/uL


(0.0-0.2) 


  


 


 


Sodium Level


  


  140mmol/L


(136-145) 


  


 


 


Potassium Level


  


  3.8mmol/L


(3.5-5.1) 


  


 


 


Chloride Level


  


  102mmol/L


() 


  


 


 


Carbon Dioxide Level


  


  26mmol/L


(21-32) 


  


 


 


Anion Gap  12 (6-14)   


 


Blood Urea Nitrogen  58mg/dL (7-20)   


 


Creatinine


  


  4.5mg/dL


(0.6-1.0) 


  


 


 


Estimated GFR


(Cockcroft-Gault) 


  10.0 


  


  


 


 


Glucose Level


  


  228mg/dL


(70-99) 


  


 


 


Calcium Level


  


  7.6mg/dL


(8.5-10.1) 


  


 








Laboratory Tests








Test


  2/26/17


10:52 2/26/17


17:03 2/26/17


20:51 2/27/17


04:50


 


Glucose (Fingerstick)


  228mg/dL


(70-99) 219mg/dL


(70-99) 276mg/dL


(70-99) 


 


 


White Blood Count


  


  


  


  3.9x10^3/uL


(4.0-11.0)


 


Red Blood Count


  


  


  


  2.48x10^6/uL


(3.50-5.40)


 


Hemoglobin


  


  


  


  7.6g/dL


(12.0-15.5)


 


Hematocrit


  


  


  


  22.8%


(36.0-47.0)


 


Mean Corpuscular Volume    92fL () 


 


Mean Corpuscular Hemoglobin    31pg (25-35) 


 


Mean Corpuscular Hemoglobin


Concent 


  


  


  33g/dL (31-37) 


 


 


Red Cell Distribution Width


  


  


  


  13.9%


(11.5-14.5)


 


Platelet Count


  


  


  


  174x10^3/uL


(140-400)


 


Neutrophils (%) (Auto)    48% (31-73) 


 


Lymphocytes (%) (Auto)    36% (24-48) 


 


Monocytes (%) (Auto)    10% (0-9) 


 


Eosinophils (%) (Auto)    6% (0-3) 


 


Basophils (%) (Auto)    1% (0-3) 


 


Neutrophils # (Auto)


  


  


  


  1.9x10^3uL


(1.8-7.7)


 


Lymphocytes # (Auto)


  


  


  


  1.4x10^3/uL


(1.0-4.8)


 


Monocytes # (Auto)


  


  


  


  0.4x10^3/uL


(0.0-1.1)


 


Eosinophils # (Auto)


  


  


  


  0.2x10^3/uL


(0.0-0.7)


 


Basophils # (Auto)


  


  


  


  0.0x10^3/uL


(0.0-0.2)


 


Sodium Level


  


  


  


  140mmol/L


(136-145)


 


Potassium Level


  


  


  


  3.8mmol/L


(3.5-5.1)


 


Chloride Level


  


  


  


  102mmol/L


()


 


Carbon Dioxide Level


  


  


  


  26mmol/L


(21-32)


 


Anion Gap    12 (6-14) 


 


Blood Urea Nitrogen    58mg/dL (7-20) 


 


Creatinine


  


  


  


  4.5mg/dL


(0.6-1.0)


 


Estimated GFR


(Cockcroft-Gault) 


  


  


  10.0 


 


 


Glucose Level


  


  


  


  228mg/dL


(70-99)


 


Calcium Level


  


  


  


  7.6mg/dL


(8.5-10.1)














Test


  2/27/17


07:45 


  


  


 


 


Glucose (Fingerstick)


  154mg/dL


(70-99) 


  


  


 








Microbiology


2/10/17 Blood Culture - Final, Complete


          NO GROWTH AFTER 5 DAYS


2/23/17 Throat Culture - Final, Complete


          


2/23/17 - Final, Complete


          


2/12/17 Urine Culture - Final, Complete


          


2/12/17 Urine Culture Result 1 (SEAN) - Final, Complete


Medications





 Current Medications


Aspirin 324 mg 324 mg 1X  ONCE PO  Last administered on 2/7/17at 12:59;  Start 2 /7/17 at 12:45;  Stop 2/7/17 at 12:48;  Status DC


Heparin Sodium/ Dextrose 500 ml @  19 mls/hr CONT  PRN IV SEE I/O RECORD Last 

administered on 2/8/17at 14:45;  Start 2/7/17 at 12:45;  Stop 2/9/17 at 15:30;  

Status DC


Heparin Sodium (Porcine) 1,950 unit PRN Q6HRS  PRN IV FOR UFH LEVEL LESS THAN 

0.2 Last administered on 2/7/17at 13:01;  Start 2/7/17 at 12:45;  Stop 2/9/17 

at 15:30;  Status DC


Ondansetron HCl (Zofran) 4 mg PRN Q8HRS  PRN IV NAUSEA/VOMITING;  Start 2/7/17 

at 12:45;  Stop 2/8/17 at 12:44;  Status DC


Morphine Sulfate 4 mg PRN Q2HR  PRN IV PAIN Last administered on 2/7/17at 14:03

;  Start 2/7/17 at 12:45;  Stop 2/8/17 at 12:44;  Status DC


Nitroglycerin (Nitrostat) 0.4 mg PRN Q5MIN  PRN SL CHEST PAIN Last administered 

on 2/7/17at 14:03;  Start 2/7/17 at 12:45;  Stop 2/8/17 at 12:44;  Status DC


Amlodipine Besylate (Norvasc) 5 mg DAILYWLUN PO  Last administered on 2/8/17at 

08:32;  Start 2/7/17 at 14:00;  Stop 2/8/17 at 16:02;  Status DC


Aspirin (Ecotrin) 81 mg DAILYWBKFT PO  Last administered on 2/26/17at 09:06;  

Start 2/7/17 at 14:00


Atorvastatin Calcium (Lipitor) 80 mg QHS PO  Last administered on 2/26/17at 21:

01;  Start 2/7/17 at 21:00


Carvedilol (Coreg) 25 mg BIDWMEALS PO  Last administered on 2/26/17at 17:46;  

Start 2/7/17 at 17:00


Clopidogrel Bisulfate (Plavix) 75 mg DAILY PO  Last administered on 2/7/17at 14:

28;  Start 2/7/17 at 14:00;  Stop 2/8/17 at 08:41;  Status DC


Isosorbide Mononitrate (Imdur) 30 mg DAILY PO  Last administered on 2/26/17at 09

:06;  Start 2/7/17 at 14:00


Furosemide (Lasix) 40 mg 1X  ONCE IVP  Last administered on 2/7/17at 14:30;  

Start 2/7/17 at 14:00;  Stop 2/7/17 at 14:23;  Status DC


Furosemide 40 mg 40 mg 1X  ONCE IVP  Last administered on 2/7/17at 14:30;  

Start 2/7/17 at 14:30;  Stop 2/7/17 at 14:38;  Status DC


Nitroglycerin/ Dextrose (Nitroglycerin Drip) 250 ml @ 0 mls/hr CONT  PRN IV SEE 

I/O RECORD Last administered on 2/7/17at 15:23;  Start 2/7/17 at 14:30;  Stop 2/ 8/17 at 16:02;  Status DC


Acetaminophen (Tylenol) 325 mg PRN Q6HRS  PRN PO MILD PAIN / TEMP Last 

administered on 2/26/17at 14:48;  Start 2/7/17 at 15:00


Acetaminophen/ Hydrocodone Bitart (Lortab 5/325) 1 tab PRN Q6HRS  PRN PO 

MODERATE TO SEVERE PAIN Last administered on 2/15/17at 21:18;  Start 2/7/17 at 

15:00


Hydralazine HCl (Apresoline) 10 mg PRN Q4HRS  PRN IVP ELEVATED BP, SEE COMMENTS 

Last administered on 2/20/17at 11:16;  Start 2/7/17 at 15:00


Ondansetron HCl (Zofran) 4 mg PRN Q8HRS  PRN IV NAUSEA/VOMITING;  Start 2/7/17 

at 15:00


Albuterol Sulfate (Ventolin Neb Soln) 2.5 mg PRN Q4HRS  PRN NEB SHORTNESS OF 

BREATH;  Start 2/7/17 at 15:00


Insulin Aspart (Novolog) 0-7 UNITS TIDWMEALS SQ  Last administered on 2/26/17at 

17:49;  Start 2/7/17 at 17:00


Dextrose 12.5 gm PRN Q15MIN  PRN IV SEE COMMENTS;  Start 2/7/17 at 15:00


Info 1 each 1 each PRN DAILY  PRN MC SEE COMMENTS Last administered on 2/9/17at 

09:38;  Start 2/7/17 at 17:00;  Stop 2/10/17 at 08:25;  Status DC


Magnesium Sulfate/ Dextrose (Magnesium Sulfate PREMIX 2GM) 50 ml @ 25 mls/hr 

PRN DAILY  PRN IV for Mag < 1.7 on am labs;  Start 2/8/17 at 11:15


Lidocaine/Sodium Bicarbonate (Buffered Lidocaine 1%) 3 ml 1X  ONCE IJ  Last 

administered on 2/8/17at 14:03;  Start 2/8/17 at 12:30;  Stop 2/8/17 at 12:34;  

Status DC


Heparin Sodium (Porcine) 2,400 unit 1X  ONCE INT CAT  Last administered on 2/8/ 17at 14:04;  Start 2/8/17 at 12:30;  Stop 2/8/17 at 12:34;  Status DC


Heparin Sodium/ Sodium Chloride 60 unit 1X  ONCE IV  Last administered on 2/8/ 17at 14:04;  Start 2/8/17 at 12:30;  Stop 2/8/17 at 12:34;  Status DC


Lisinopril (Prinivil) 20 mg DAILY PO  Last administered on 2/12/17at 09:22;  

Start 2/8/17 at 16:00;  Stop 2/13/17 at 10:31;  Status DC


Hydralazine HCl 50 mg 50 mg BID PO  Last administered on 2/12/17at 20:55;  

Start 2/8/17 at 21:00;  Stop 2/13/17 at 10:31;  Status DC


Sodium Chloride (Iv Sodium Chloride 0.9% 1000ml Bag) 1,000 ml @  1,000 mls/hr 

Q1H PRN IV hypotension;  Start 2/8/17 at 18:28;  Stop 2/9/17 at 00:27;  Status 

DC


Diphenhydramine HCl (Benadryl) 25 mg 1X PRN  PRN IV ITCHING;  Start 2/8/17 at 18

:30;  Stop 2/9/17 at 18:29;  Status DC


Diphenhydramine HCl (Benadryl) 25 mg 1X PRN  PRN IV ITCHING;  Start 2/8/17 at 18

:30;  Stop 2/9/17 at 18:29;  Status DC


Sodium Chloride (Normal Saline Flush) 10 ml 1X PRN  PRN IV AP catheter pack;  

Start 2/8/17 at 18:30;  Stop 2/9/17 at 18:29;  Status DC


Sodium Chloride 10 ml 10 ml 1X PRN  PRN IV  catheter pack;  Start 2/8/17 at 18

:30;  Stop 2/9/17 at 18:29;  Status DC


Sodium Chloride (Iv Sodium Chloride 0.9% 1000ml Bag) 1,000 ml @  400 mls/hr 

Q2H30M PRN IV PATENCY;  Start 2/8/17 at 18:28;  Stop 2/9/17 at 06:27;  Status DC


Info (PHARMACY MONITORING -- do not chart) 1 each PRN DAILY  PRN MC SEE COMMENTS

;  Start 2/8/17 at 18:30


Info (PHARMACY MONITORING -- do not chart) 1 each PRN DAILY  PRN MC SEE COMMENTS

;  Start 2/9/17 at 10:30;  Status UNV


Info (PHARMACY MONITORING -- do not chart) 1 each PRN DAILY  PRN MC SEE COMMENTS

;  Start 2/9/17 at 10:30;  Status Cancel


Darbepoetin Valeriano (Aranesp) 60 mcg WEEKLYHS SQ  Last administered on 2/23/17at 21

:04;  Start 2/9/17 at 21:00


Clopidogrel Bisulfate (Plavix) 75 mg DAILYWBKFT PO ;  Start 2/10/17 at 08:00;  

Stop 2/10/17 at 08:00;  Status DC


Clopidogrel Bisulfate 75 mg 75 mg DAILYWBKFT PO  Last administered on 2/10/17at 

08:25;  Start 2/9/17 at 15:30;  Stop 2/11/17 at 18:54;  Status DC


Sodium Chloride (Iv Sodium Chloride 0.9% 1000ml Bag) 1,000 ml @  1,000 mls/hr 

Q1H PRN IV hypotension;  Start 2/10/17 at 08:11;  Stop 2/10/17 at 15:00;  

Status DC


Diphenhydramine HCl (Benadryl) 25 mg 1X PRN  PRN IV ITCHING;  Start 2/10/17 at 

08:15;  Stop 2/10/17 at 15:00;  Status DC


Diphenhydramine HCl (Benadryl) 25 mg 1X PRN  PRN IV ITCHING;  Start 2/10/17 at 

08:15;  Stop 2/10/17 at 15:00;  Status DC


Sodium Chloride (Normal Saline Flush) 10 ml 1X PRN  PRN IV AP catheter pack;  

Start 2/10/17 at 08:15;  Stop 2/10/17 at 15:00;  Status DC


Sodium Chloride 10 ml 10 ml 1X PRN  PRN IV  catheter pack;  Start 2/10/17 at 

08:15;  Stop 2/10/17 at 15:00;  Status DC


Sodium Chloride (Iv Sodium Chloride 0.9% 1000ml Bag) 1,000 ml @  400 mls/hr 

Q2H30M PRN IV PATENCY;  Start 2/10/17 at 08:11;  Stop 2/10/17 at 21:00;  Status 

DC


Info 1 each 1 each PRN DAILY  PRN MC SEE COMMENTS;  Start 2/10/17 at 08:15;  

Status UNV


Magnesium Sulfate/ Dextrose 100 ml @  100 mls/hr 1X  ONCE IV  Last administered 

on 2/10/17at 12:36;  Start 2/10/17 at 10:30;  Stop 2/10/17 at 11:29;  Status DC


Ceftriaxone Sodium 1 gm/ Sodium Chloride 50 ml @  100 mls/hr Q24H IV  Last 

administered on 2/15/17at 17:35;  Start 2/12/17 at 16:00;  Stop 2/16/17 at 15:02

;  Status DC


Sodium Chloride 1,000 ml @  1,000 mls/hr Q1H PRN IV hypotension;  Start 2/13/17 

at 08:48;  Stop 2/13/17 at 14:47;  Status DC


Sodium Chloride (Iv Sodium Chloride 0.9% 1000ml Bag) 1,000 ml @  400 mls/hr 

Q2H30M PRN IV PATENCY;  Start 2/13/17 at 08:48;  Stop 2/13/17 at 20:47;  Status 

DC


Info (PHARMACY MONITORING -- do not chart) 1 each PRN DAILY  PRN MC SEE COMMENTS

;  Start 2/13/17 at 09:00;  Status UNV


Lisinopril (Prinivil) 40 mg DAILY PO  Last administered on 2/26/17at 09:07;  

Start 2/14/17 at 09:00


Cyanocobalamin (Vitamin B-12) 1,000 mcg DAILY PO  Last administered on 2/26/ 17at 09:07;  Start 2/13/17 at 15:30


Ferrous Sulfate (Feosol) 325 mg BID PO  Last administered on 2/26/17at 21:01;  

Start 2/13/17 at 21:00


Heparin Sodium (Porcine) 10,000 unit STK-MED ONCE .ROUTE ;  Start 2/14/17 at 17:

14;  Stop 2/14/17 at 17:15;  Status DC


Lidocaine/ Epinephrine 20 ml 20 ml STK-MED ONCE .ROUTE ;  Start 2/14/17 at 17:14

;  Stop 2/14/17 at 17:15;  Status DC


Heparin Sodium/ Sodium Chloride 500 ml @  As Directed STK-MED ONCE .ROUTE ;  

Start 2/14/17 at 17:14;  Stop 2/14/17 at 17:15;  Status DC


Fentanyl Citrate (Fentanyl 2ml Vial) 100 mcg STK-MED ONCE .ROUTE ;  Start 2/14/ 17 at 17:35;  Stop 2/14/17 at 17:36;  Status DC


Midazolam HCl 2 mg 2 mg STK-MED ONCE .ROUTE ;  Start 2/14/17 at 17:35;  Stop 2/ 14/17 at 17:36;  Status DC


Cefazolin Sodium (Ancef 1gm Ivpb For Omni) 50 ml @ As Directed STK-MED ONCE IV 

;  Start 2/14/17 at 17:36;  Stop 2/14/17 at 17:37;  Status DC


Heparin Sodium/ Sodium Chloride 1,000 unit 1X  ONCE IART  Last administered on 2 /14/17at 18:04;  Start 2/14/17 at 18:00;  Stop 2/14/17 at 18:02;  Status DC


Midazolam HCl (Versed) 2 mg 1X  ONCE IV  Last administered on 2/14/17at 18:04;  

Start 2/14/17 at 18:00;  Stop 2/14/17 at 18:02;  Status DC


Fentanyl Citrate (Fentanyl 2ml Vial) 100 mcg 1X  ONCE IV  Last administered on 2 /14/17at 18:04;  Start 2/14/17 at 18:00;  Stop 2/14/17 at 18:02;  Status DC


Heparin Sodium (Porcine) 4300 unit 4,300 unit 1X  ONCE IV  Last administered on 

2/14/17at 18:05;  Start 2/14/17 at 18:00;  Stop 2/14/17 at 18:02;  Status DC


Cefazolin Sodium (Ancef 1gm Ivpb For Omni) 50 ml @  100 mls/hr 1X  ONCE IV  

Last administered on 2/14/17at 18:06;  Start 2/14/17 at 18:00;  Stop 2/14/17 at 

18:29;  Status DC


Lidocaine/ Epinephrine 10 ml 10 ml 1X  ONCE IJ  Last administered on 2/14/17at 

18:05;  Start 2/14/17 at 18:00;  Stop 2/14/17 at 18:02;  Status DC


Sodium Chloride 1,000 ml @  1,000 mls/hr Q1H PRN IV hypotension;  Start 2/15/17 

at 14:32;  Stop 2/15/17 at 20:31;  Status DC


Albumin Human (Albuminar) 200 ml @  200 mls/hr 1X PRN  PRN IV Hypotension;  

Start 2/15/17 at 14:45;  Stop 2/15/17 at 20:44;  Status DC


Sodium Chloride (Normal Saline Flush) 10 ml 1X PRN  PRN IV AP catheter pack;  

Start 2/15/17 at 14:45;  Stop 2/16/17 at 14:44;  Status DC


Sodium Chloride 10 ml 10 ml 1X PRN  PRN IV  catheter pack;  Start 2/15/17 at 

14:45;  Stop 2/16/17 at 14:44;  Status DC


Sodium Chloride (Iv Sodium Chloride 0.9% 1000ml Bag) 1,000 ml @  400 mls/hr 

Q2H30M PRN IV PATENCY;  Start 2/15/17 at 14:32;  Stop 2/16/17 at 02:31;  Status 

DC


Glimepiride 1 mg 1 mg DAILY PO  Last administered on 2/18/17at 09:28;  Start 2/ 16/17 at 17:00;  Stop 2/18/17 at 13:46;  Status DC


Sodium Chloride (Iv Sodium Chloride 0.9% 1000ml Bag) 1,000 ml @  1,000 mls/hr 

Q1H PRN IV hypotension;  Start 2/17/17 at 09:18;  Stop 2/17/17 at 15:17;  

Status DC


Acetaminophen (Tylenol) 500 mg 1X PRN  PRN PO MILD PAIN / TEMP;  Start 2/17/17 

at 09:30;  Stop 2/18/17 at 09:29;  Status DC


Diphenhydramine HCl (Benadryl) 25 mg 1X PRN  PRN IV ITCHING;  Start 2/17/17 at 

09:30;  Stop 2/18/17 at 09:29;  Status DC


Diphenhydramine HCl (Benadryl) 25 mg 1X PRN  PRN IV ITCHING;  Start 2/17/17 at 

09:30;  Stop 2/18/17 at 09:29;  Status DC


Sodium Chloride (Normal Saline Flush) 10 ml 1X PRN  PRN IV AP catheter pack;  

Start 2/17/17 at 09:30;  Stop 2/18/17 at 09:29;  Status DC


Sodium Chloride (Normal Saline Flush) 10 ml 1X PRN  PRN IV  catheter pack;  

Start 2/17/17 at 09:30;  Stop 2/18/17 at 09:29;  Status DC


Labetalol HCl 10 mg 10 mg PRN Q1HR  PRN IVP SBP > 180;  Start 2/17/17 at 09:30;

  Stop 2/18/17 at 09:29;  Status DC


Sodium Chloride (Iv Sodium Chloride 0.9% 1000ml Bag) 1,000 ml @  400 mls/hr 

Q2H30M PRN IV PATENCY;  Start 2/17/17 at 09:18;  Stop 2/17/17 at 21:17;  Status 

DC


Info (PHARMACY MONITORING -- do not chart) 1 each PRN DAILY  PRN MC SEE COMMENTS

;  Start 2/17/17 at 09:30;  Status UNV


Amlodipine Besylate (Norvasc) 5 mg DAILY PO  Last administered on 2/23/17at 09:

37;  Start 2/18/17 at 12:00;  Stop 2/23/17 at 10:23;  Status DC


Glimepiride (Amaryl) 2 mg DAILY PO  Last administered on 2/26/17at 09:05;  

Start 2/19/17 at 09:00


Insulin Detemir 10 units 10 units QHS SQ  Last administered on 2/26/17at 21:07;

  Start 2/19/17 at 21:00


Sodium Chloride (Iv Sodium Chloride 0.9% 1000ml Bag) 1,000 ml @  1,000 mls/hr 

Q1H PRN IV hypotension;  Start 2/20/17 at 13:02;  Stop 2/20/17 at 19:01;  

Status DC


Sodium Chloride (Normal Saline Flush) 10 ml 1X PRN  PRN IV AP catheter pack;  

Start 2/20/17 at 13:15;  Stop 2/21/17 at 13:14;  Status DC


Sodium Chloride (Normal Saline Flush) 10 ml 1X PRN  PRN IV  catheter pack;  

Start 2/20/17 at 13:15;  Stop 2/21/17 at 13:14;  Status DC


Info (PHARMACY MONITORING -- do not chart) 1 each PRN DAILY  PRN MC SEE COMMENTS

;  Start 2/20/17 at 13:15;  Status UNV


Info 1 each 1 each PRN DAILY  PRN MC SEE COMMENTS;  Start 2/20/17 at 13:15;  

Status UNV


Sodium Chloride (Iv Sodium Chloride 0.9% 1000ml Bag) 1,000 ml @  1,000 mls/hr 

Q1H PRN IV hypotension;  Start 2/22/17 at 07:54;  Stop 2/22/17 at 13:53;  

Status DC


Diphenhydramine HCl (Benadryl) 25 mg 1X PRN  PRN IV ITCHING;  Start 2/22/17 at 

08:00;  Stop 2/23/17 at 07:59;  Status DC


Diphenhydramine HCl (Benadryl) 25 mg 1X PRN  PRN IV ITCHING;  Start 2/22/17 at 

08:00;  Stop 2/23/17 at 07:59;  Status DC


Sodium Chloride (Normal Saline Flush) 10 ml 1X PRN  PRN IV AP catheter pack;  

Start 2/22/17 at 08:00;  Stop 2/23/17 at 07:59;  Status DC


Sodium Chloride (Normal Saline Flush) 10 ml 1X PRN  PRN IV  catheter pack;  

Start 2/22/17 at 08:00;  Stop 2/23/17 at 07:59;  Status DC


Labetalol HCl 10 mg 10 mg PRN Q1HR  PRN IVP SBP > 180 Last administered on 2/22/ 17at 11:04;  Start 2/22/17 at 08:00;  Stop 2/23/17 at 07:59;  Status DC


Sodium Chloride (Iv Sodium Chloride 0.9% 1000ml Bag) 1,000 ml @  400 mls/hr 

Q2H30M PRN IV PATENCY;  Start 2/22/17 at 07:54;  Stop 2/22/17 at 19:53;  Status 

DC


Info (PHARMACY MONITORING -- do not chart) 1 each PRN DAILY  PRN MC SEE COMMENTS

;  Start 2/22/17 at 08:00;  Status UNV


Info (PHARMACY MONITORING -- do not chart) 1 each PRN DAILY  PRN MC SEE COMMENTS

;  Start 2/22/17 at 08:00;  Status UNV


Amlodipine Besylate (Norvasc) 10 mg DAILY PO  Last administered on 2/26/17at 09:

06;  Start 2/23/17 at 11:00


Throat Lozenges 1 shahida 1 shahida PRN Q2HRS  PRN PO SORE THROAT Last administered on 2 /23/17at 21:01;  Start 2/23/17 at 11:15


Sodium Chloride (Iv Sodium Chloride 0.9% 1000ml Bag) 1,000 ml @  1,000 mls/hr 

Q1H PRN IV hypotension;  Start 2/24/17 at 07:44;  Stop 2/24/17 at 13:43;  

Status DC


Diphenhydramine HCl (Benadryl) 25 mg 1X PRN  PRN IV ITCHING;  Start 2/24/17 at 

07:45;  Stop 2/25/17 at 07:44;  Status DC


Diphenhydramine HCl (Benadryl) 25 mg 1X PRN  PRN IV ITCHING;  Start 2/24/17 at 

07:45;  Stop 2/25/17 at 07:44;  Status DC


Sodium Chloride (Normal Saline Flush) 10 ml 1X PRN  PRN IV AP catheter pack;  

Start 2/24/17 at 07:45;  Stop 2/25/17 at 07:44;  Status DC


Sodium Chloride (Normal Saline Flush) 10 ml 1X PRN  PRN IV  catheter pack;  

Start 2/24/17 at 07:45;  Stop 2/25/17 at 07:44;  Status DC


Labetalol HCl (Normodyne) 10 mg PRN Q1HR  PRN IVP SBP > 180 Last administered 

on 2/24/17at 10:56;  Start 2/24/17 at 07:45;  Stop 2/25/17 at 07:44;  Status DC


Clonidine HCl 0.1 mg 0.1 mg 1X PRN  PRN PO SBP > 180 Last administered on 2/24/ 17at 11:03;  Start 2/24/17 at 07:45;  Stop 2/25/17 at 07:44;  Status DC


Sodium Chloride (Iv Sodium Chloride 0.9% 1000ml Bag) 1,000 ml @  400 mls/hr 

Q2H30M PRN IV PATENCY;  Start 2/24/17 at 07:44;  Stop 2/24/17 at 19:43;  Status 

DC


Info (PHARMACY MONITORING -- do not chart) 1 each PRN DAILY  PRN MC SEE COMMENTS

;  Start 2/24/17 at 07:45;  Status UNV


Ibuprofen 400 mg 400 mg PRN Q6HRS  PRN PO INFLAMMATION Last administered on 2/25 /17at 11:58;  Start 2/24/17 at 22:45


Sodium Chloride (Iv Sodium Chloride 0.9% 1000ml Bag) 1,000 ml @  1,000 mls/hr 

Q1H PRN IV hypotension;  Start 2/27/17 at 08:14;  Stop 2/27/17 at 14:13


Sodium Chloride (Normal Saline Flush) 10 ml 1X PRN  PRN IV AP catheter pack;  

Start 2/27/17 at 08:15;  Stop 2/28/17 at 08:14


Sodium Chloride (Normal Saline Flush) 10 ml 1X PRN  PRN IV  catheter pack;  

Start 2/27/17 at 08:15;  Stop 2/28/17 at 08:14


Info (PHARMACY MONITORING -- do not chart) 1 each PRN DAILY  PRN MC SEE COMMENTS

;  Start 2/27/17 at 08:15;  Status UNV


Info (PHARMACY MONITORING -- do not chart) 1 each PRN DAILY  PRN MC SEE COMMENTS

;  Start 2/27/17 at 08:15;  Status UNV





Active Scripts


Active


Lisinopril 20 Mg Tablet 20 Mg PO QHS


Isosorbide Mononitrate Er (Isosorbide Mononitrate) 30 Mg Tab.er.24h 30 Mg PO 

DAILY


Hydralazine Hcl 50 Mg Tablet 100 Mg PO TID


Carvedilol 12.5 Mg Tablet 25 Mg PO BIDWMEALS


Atorvastatin Calcium 40 Mg Tablet 80 Mg PO QHS


Aspirin Ec (Aspirin) 81 Mg Tablet.dr 81 Mg PO DAILYWBKFT


Amlodipine Besylate 5 Mg Tablet 5 Mg PO DAILYWLUN


Reported


Clopidogrel (Clopidogrel Bisulfate) 75 Mg Tablet 75 Mg PO DAILY


Glyburide 5 Mg Tablet 1 Tab PO BIDWMEALS


Lisinopril 20 Mg Tablet 1 Tab PO DAILY


Vitals/I & O





 Vital Sign - Last 24 Hours








 2/26/17 2/26/17 2/26/17 2/26/17





 14:40 17:46 19:16 20:00


 


Temp 99.1  98.7 





 99.1  98.7 


 


Pulse 62 62 64 


 


Resp 20  16 


 


B/P 124/51 124/51 120/50 


 


Pulse Ox 94  91 


 


O2 Delivery Room Air  Room Air Room Air


 


O2 Flow Rate    2.0


 


    





    





 2/26/17 2/27/17 2/27/17 2/27/17





 23:01 03:27 07:20 08:00


 


Temp 99.1 98.6 98.7 





 99.1 98.6 98.7 


 


Pulse 64 64 65 


 


Resp 18 16 16 


 


B/P 134/46 137/50 137/51 


 


Pulse Ox 94 91 99 


 


O2 Delivery Room Air Room Air Room Air Room Air














 Intake and Output   


 


 2/26/17 2/26/17 2/27/17





 15:00 23:00 07:00


 


Intake Total  1300 ml 150 ml


 


Balance  1300 ml 150 ml














CASTLE,NIAL K III DO Feb 27, 2017 10:45

## 2017-03-03 NOTE — ED.ADGEN
Past Medical History


Past Medical History:  CAD, CHF, Diabetes-Type II, Hypertension, Other


Additional Past Medical Histor:  PSORASIS


Past Surgical History:  Angioplasty, Coronary Bypass Surgery, Other


Additional Past Surgical Histo:  2L fluid removed from lungs, coronary 

angiography


Alcohol Use:  None


Drug Use:  None





Adult General


Chief Complaint


Chief Complaint:  DIALYSIS PROBLEM





HPI


HPI


Patient is a 59  year old woman, history of end-stage renal disease on 

hemodialysis, hypertension, CHF, psoriasis, who presents to the emergency 

department for dialysis. Currently patient does not have a dialysis Center, and 

has been instructed to present to the emergency department Monday Wednesday and 

Friday for dialysis. Patient did miss her Wednesday dialysis as she was not 

feeling well. At this time she is denying all complaints. Patient is primarily 

Hebrew speaking, translation is assisted by her daughter at bedside per their 

request. Patient denies any chest pain or shortness breath, nausea vomiting, 

any fevers or chills, any weakness emesis or tingling. No pain or drainage at 

her dialysis site. Received a full dialysis without, patient on Monday. Per 

protocol in the ED, an i-STAT chemistry is obtained upon patient's arrival.





Review of Systems


Review of Systems


Constitutional:  Denies fever or chills. []


Eyes:  Denies change in visual acuity. []


HENT:  Denies nasal congestion or sore throat. [] 


Respiratory:  Denies cough or shortness of breath. [] 


Cardiovascular:  Denies chest pain or edema. [] 


GI:  Denies abdominal pain, nausea, vomiting, bloody stools or diarrhea. [] 


:  Denies dysuria. [] 


Musculoskeletal:  Denies back pain or joint pain. [] 


Integument:  Denies rash. [] 


Neurologic:  Denies headache, focal weakness or sensory changes. [] 


Endocrine:  Denies polyuria or polydipsia. [] 


Lymphatic:  Denies swollen glands. [] 


Psychiatric:  Denies depression or anxiety. []





Denies all complaints at this time, presenting for dialysis.





Allergies


Allergies





 Allergies








Coded Allergies Type Severity Reaction Last Updated Verified


 


  No Known Drug Allergies    2/28/16 No











Physical Exam


Physical Exam





Constitutional: Well developed, well nourished, no acute distress, non-toxic 

appearance. []


HENT: Normocephalic, atraumatic, bilateral external ears normal, oropharynx 

moist, no oral exudates, nose normal. []


Eyes: PERRLA, EOMI, conjunctiva normal, no discharge. [] 


Neck: Normal range of motion, no tenderness, supple, no stridor. [] 


Cardiovascular:Heart rate regular rhythm, no murmur, S1, S2, rubs or gallops. []


Lungs & Thorax: Diminished breath sounds at bases bilaterally, no rhonchi, no 

rales, no wheezing, rhonchi, rales. [Patient with right-sided subclavian 

catheter in place, site is clean dry intact and nontender.]


Abdomen: Bowel sounds normal, soft, no tenderness, no masses, no pulsatile 

masses. [] 


Skin: Warm, dry, no erythema, no rash. [] 


Back: No tenderness, no CVA tenderness. [] 


Extremities: No tenderness, no cyanosis, no clubbing, ROM intact, no edema. 

Negative Homans sign. [] 


Neurologic: Alert and oriented X 3, normal motor function, normal sensory 

function, no focal deficits noted. []


Psychologic: Affect normal, judgement normal, mood normal. []





Current Patient Data


Vital Signs





 Vital Signs








  Date Time  Temp Pulse Resp B/P Pulse Ox O2 Delivery O2 Flow Rate FiO2


 


3/3/17 10:18  65  160/72 99 Room Air  


 


3/3/17 09:30 98.5  16     





 98.5       








Lab Values





 Laboratory Tests








Test


  3/3/17


09:35


 


POC Hemoglobin


  8.8g/dL


(12-15)  L


 


POC Hematocrit 26% (36-40)  L


 


POC Sodium


  136mmol/L


(135-145)


 


POC Potassium


  5.4mmol/L


(3.5-5.0)  H


 


POC Chloride


  104mmol/L


()


 


POC Total CO2


  22mmol/L


(23-32)  L


 


Anion Gap


  16mmol/L


(6-14)  H


 


POC Blood Urea Nitrogen


  65mg/dL (8-26)


H


 


POC Creatinine


  5.1mg/dL


(0.5-1.4)  H


 


Glucose Level


  201mg/dL


(70-99)  H


 


POC Ionized Calcium (Zahraa)


  0.98mmol/L


(1.13-1.32)  L





 Laboratory Tests


3/3/17 09:35














EKG


EKG


Not indicated. []





Radiology/Procedures


Radiology/Procedures


Not indicated. []





Course & Med Decision Making


Course & Med Decision Making


Pertinent Labs and Imaging studies reviewed. (See chart for details)





Patient with no complaints at this time, presented for dialysis. Patient's i-

STAT potassium is 5.4, glucose of 201, hemoglobin of 8.8. Hemoglobin of 7.6 on 

previous laboratory studies. I did discuss these findings with patient and 

daughter bedside, patient is agreeable to receiving dialysis today in the 

emergency department. Nephrology was paged, above discussed with Dr. Mcconnell of 

nephrology, patient will require admission to the hospital for emergent 

hemodialysis, plan to then be discharged once HD is obtained. I discussed this 

with Dr. Solo of internal medicine, patient accepted to her service for 

admission for hyperkalemia and emergent dialysis with plan as above. Patient 

remained stable and comfortable transferred for dialysis without issue.





Dragon Disclaimer


Dragon Disclaimer


This electronic medical record was generated, in whole or in part, using a 

voice recognition dictation system.





Departure


Impression:  


 Primary Impression:  


 Hyperkalemia


 Additional Impression:  


 ESRD (end stage renal disease) on dialysis


Disposition:  09 ADMITTED AS INPATIENT


Admitting Physician:  Jes Solo


Condition:  STABLE





Problem Qualifiers








MEGHANA BURKS DO Mar 3, 2017 10:02

## 2017-03-03 NOTE — PDOC
Provider Note


Provider Note


23 hr admit and dc done, JOb # [id number]








OLIVIA ROMERO MD Mar 3, 2017 13:12

## 2017-03-04 NOTE — SSS
ADMIT DATE:  03/03/2017



CHIEF COMPLAINT:  "Do dialysis."



HISTORY OF PRESENT ILLNESS:  The patient is a middle-aged 59-year-old 

female who does not speak English, but the daughter always comes to bring her to

dialysis Monday, Wednesday and Friday and speaks English, coming in because she

missed dialysis on Wednesday because she was not feeling well and now is having

dialysis, currently seen on dialysis.  No symptoms.  She unfortunately is self

pay, hence no outpatient dialysis has been set up yet.  She was last here on

February 2017 for congestive heart failure.  She has chronic congestive heart

failure, history of NSTEMI, CKD, ischemic cardiomyopathy, malignant hypertension

which has resolved, dyslipidemia, anemia of chronic disease, psoriasis, history

of UTI and diabetes type 2.



Again, the patient denies any symptoms.  Still has scripts at home, so the

patient will be sent home after dialysis today.



PAST MEDICAL HISTORY:  Ischemic cardiomyopathy, ESRD, NSTEMI, anemia,

hyperlipidemia, psoriasis, CAD, UTI, diabetes.



PAST SURGICAL HISTORY:  Ureteral stent placement, PCI placement in 2016.



FAMILY HISTORY:  CAD.



SOCIAL HISTORY:  No smoking, no alcohol, no street drugs.  Lives with family.



ALLERGIES:  None.



REVIEW OF SYSTEMS:  Denies all 14-point systems reviewed.



PHYSICAL EXAMINATION:

GENERAL:  Awake, alert, oriented x 3, not in acute respiratory distress.

HEENT:  Unremarkable.

LUNGS:  Clear to auscultation bilaterally.

CARDIOVASCULAR:  Normal rate and rhythm.  No murmurs, rubs or gallops.

ABDOMEN:  Soft, flabby, nontender, normoactive bowel sounds.

GENITALIA:  Appropriate for age.

EXTREMITIES:  No edema.  Pulses full and equal.  _____.



ASSESSMENT:

1.  End-stage renal disease, on dialysis Monday, Wednesday and Friday.

2.  Chronic systolic heart failure.

3.  Non-ST elevation myocardial infarction, chronic, stable.

4.  Coronary artery disease, chronic, stable.

5.  Ischemic cardiomyopathy, chronic, stable.

6.  Hypertension.

7.  Diabetes type 2.

8.  Psoriasis, chronic, stable.

9.  Yakut speaking.

10.  Documentation of noncompliance due to financial troubles.



PLAN OF CARE:  Dialysis today.  Follow up with PCP.  Establish PCP care. 

Working on getting outpatient dialysis care of /Medicare Medicaid

discussed with the patient and the daughter at bedside.

 



______________________________

OLIVIA ROMERO MD



DR:  /marcela  JOB#:  [id number] / [id number]

DD:  03/03/2017 13:11  DT:  03/04/2017 02:09

## 2017-03-06 NOTE — PDOC3
Discharge Summary [facility]


Date of Admission:  Mar 6, 2017


Discharge Date:  Mar 6, 2017


Admitting Diagnosis


1. ESRD on HD [facility]


comes to hosp since no outpt HD set up


CAD,


CHF


H/O CABG


HTN


DM2


Psoriasis


Problems:  


Final Diagnosis


 Problems


Medical Problems:


(1) ESRD (end stage renal disease)


Status: Acute  








CONSULTS


renal


Brief Hospital Course


60yo F, was recently dced from hosp, new set up for ESRD HD [facility], comes for Hd, 

SINCE pt has medicaid pending, cannot set up an outpt HD for now.


i talked to her daughter, pt is Belarusian speaking, pt feels good, no complains, 

has home meds.


dc after HD.


Patient History:  


Patient reports no known family medical history.


Problems:  


Disposition


home


CONDITION AT DISCHARGE:  Improved


Scheduled


Amlodipine Besylate (Amlodipine Besylate) 5 MG PO DAILYWLUN 


Aspirin (Aspirin Ec) 81 MG PO DAILYWBKFT 


Atorvastatin Calcium (Atorvastatin Calcium) 80 MG PO QHS 


Carvedilol (Carvedilol) 25 MG PO BIDWMEALS 


Clopidogrel Bisulfate (Clopidogrel) 75 MG PO DAILY (Reported) 


Glyburide (Glyburide) 1 TAB PO BIDWMEALS (Reported) 


Insulin Detemir (Levemir Flextouch) 20 UNIT SQ HS (Reported) 


Isosorbide Mononitrate (Isosorbide Mononitrate Er) 30 MG PO DAILY 


Lisinopril (Lisinopril) 1 TAB PO DAILY (Reported) 








ARTEMIO NUNES MD Mar 6, 2017 15:54

## 2017-03-06 NOTE — PDOC
Dialysis Progress Note


Dialysis Note


Dialysis Note


Seen on Hemodialysis, tolerating treatment     Well





Vitals on Hemodialysis:  199/75      66





 





 


General Appearance:          


Awake:          


Alert Oriented  x      3


Neck:    No JVD or JVP


Chest:    CTA Roberto


Heart:    S1 S2


Abdomen -    Soft NTND


Extremities -    No Edema








ESRD :   Dialysis as below





F 180 NR  3.0  Hrs





3 K 2.5 Ca 140 Na  35 HC03





Qb 350 + Qd 500+





Heparin  0 Units





Uf 0-1 Kgs or to dry weight as tolerated








May give 25-50 gms of 25% Albumin if needed to maintain Hemodynamic stability





Treatment plan reviewed and discussed with Dialysis RN





Vitals


Vital Signs





 Vital Signs








  Date Time  Temp Pulse Resp B/P Pulse Ox O2 Delivery O2 Flow Rate FiO2


 


3/6/17 14:10      Room Air  


 


3/6/17 13:53  75 16 176/82 98   


 


3/6/17 11:05 98.4       





 98.4       











Labs


Last Labs





Laboratory Tests








Test


  3/6/17


11:22


 


Bedside Hemoglobin


  9.9g/dL


(12-15)


 


Bedside Hematocrit 29% (36-40) 


 


Bedside Sodium


  136mmol/L


(135-145)


 


Bedside Potassium


  4.2mmol/L


(3.5-5.0)


 


Bedside Chloride


  100mmol/L


()


 


Bedside Total CO2


  23mmol/L


(23-32)


 


Anion Gap


  19mmol/L


(6-14)


 


Bedside Blood Urea Nitrogen 42mg/dL (8-26) 


 


Bedside Creatinine


  4.3mg/dL


(0.5-1.4)


 


Glucose Level


  313mg/dL


(70-99)


 


Bedside Ionized Calcium (Zahraa)


  0.97mmol/L


(1.13-1.32)








Laboratory Tests








Test


  3/6/17


11:22


 


Bedside Hemoglobin


  9.9g/dL


(12-15)


 


Bedside Hematocrit 29% (36-40) 


 


Bedside Sodium


  136mmol/L


(135-145)


 


Bedside Potassium


  4.2mmol/L


(3.5-5.0)


 


Bedside Chloride


  100mmol/L


()


 


Bedside Total CO2


  23mmol/L


(23-32)


 


Anion Gap


  19mmol/L


(6-14)


 


Bedside Blood Urea Nitrogen 42mg/dL (8-26) 


 


Bedside Creatinine


  4.3mg/dL


(0.5-1.4)


 


Glucose Level


  313mg/dL


(70-99)


 


Bedside Ionized Calcium (Zahraa)


  0.97mmol/L


(1.13-1.32)











Assessment


Assessment


 Problems


Medical Problems:


(1) ESRD (end stage renal disease)


Status: Acute  





Problems:  





Plan


Plan of Care


 Problems


Medical Problems:


(1) ESRD (end stage renal disease)


Status: Acute  














RENAN DODD MD Mar 6, 2017 15:01

## 2017-03-06 NOTE — PHYS DOC
Past Medical History


Past Medical History:  CAD, CHF, Diabetes-Type II, Hypertension, Other


Additional Past Medical Histor:  PSORASIS


Past Surgical History:  Angioplasty, Coronary Bypass Surgery, Other


Additional Past Surgical Histo:  2L fluid removed from lungs, coronary 

angiography


Alcohol Use:  None


Drug Use:  None





Adult General


Chief Complaint


Chief Complaint:  DIALYSIS PROBLEM





HPI


HPI


Patient is a 59  year old female with ESRD on dialysis who presents with 

daughter for nausea and need for MWF dialysis.  She does not have access to 

outpatient dialysis.  She denies chest pain, dyspnea, abdominal pain, emesis, 

fever or chills, diarrhea, rhinorrhea.





Review of Systems


Review of Systems





Constitutional: Denies fever or chills []


Eyes: Denies change in visual acuity, redness, or eye pain []


HENT: Denies nasal congestion or sore throat []


Respiratory: Denies cough or shortness of breath []


Cardiovascular: No additional information not addressed in HPI []


GI: Denies abdominal pain, vomiting, bloody stools or diarrhea []


: Denies dysuria or hematuria []


Musculoskeletal: Denies back pain or joint pain []


Integument: Denies rash or skin lesions []


Neurologic: Denies headache, focal weakness or sensory changes []


Endocrine: Denies polyuria or polydipsia []





Allergies


Allergies





 Allergies








Coded Allergies Type Severity Reaction Last Updated Verified


 


  No Known Drug Allergies    2/28/16 No











Physical Exam


Physical Exam





Constitutional: Well developed, well nourished, no acute distress, non-toxic 

appearance. []


HENT: Normocephalic, atraumatic, bilateral external ears normal, oropharynx 

moist, nose normal. []


Eyes: PERRLA, EOMI. [] 


Neck: Normal range of motion, supple. [] 


Cardiovascular:Heart rate regular rhythm []


Lungs & Thorax:  Bilateral breath sounds clear to auscultation.  tunneled HD 

catheter to right upper chest []


Abdomen: Bowel sounds normal, soft, no tenderness. [] 


Skin: Warm, dry, no erythema, no rash. [] 


Back: Normal ROM. [] 


Extremities: No tenderness, ROM intact, no edema. [] 


Neurologic: Alert and oriented X 3, normal motor function, normal sensory 

function, no focal deficits noted. []


Psychologic: Affect normal, judgement normal, mood normal. []





Current Patient Data


Vital Signs





 Vital Signs








  Date Time  Temp Pulse Resp B/P Pulse Ox O2 Delivery O2 Flow Rate FiO2


 


3/6/17 12:55  70 18 175/74 98 Room Air  


 


3/6/17 11:05 98.4       





 98.4       








Lab Values





 Laboratory Tests








Test


  3/6/17


11:22


 


POC Hemoglobin


  9.9g/dL


(12-15)  L


 


POC Hematocrit 29% (36-40)  L


 


POC Sodium


  136mmol/L


(135-145)


 


POC Potassium


  4.2mmol/L


(3.5-5.0)


 


POC Chloride


  100mmol/L


()


 


POC Total CO2


  23mmol/L


(23-32)


 


Anion Gap


  19mmol/L


(6-14)  H


 


POC Blood Urea Nitrogen


  42mg/dL (8-26)


H


 


POC Creatinine


  4.3mg/dL


(0.5-1.4)  H


 


Glucose Level


  313mg/dL


(70-99)  H


 


POC Ionized Calcium (Zahraa)


  0.97mmol/L


(1.13-1.32)  L





 Laboratory Tests


3/6/17 11:22











Course & Med Decision Making


Course & Med Decision Making


Pertinent Labs and Imaging studies reviewed. (See chart for details)





Laboratory evaluation is unremarkable. Discussed case with Dr. Gillette, nephrology

, who recommends admission for dialysis. Case with Dr. Pham, who will admit.





Dragon Disclaimer


Dragon Disclaimer


This electronic medical record was generated, in whole or in part, using a 

voice recognition dictation system.





Departure


Departure


Impression:  


 Primary Impression:  


 ESRD (end stage renal disease)


Disposition:  09 ADMITTED AS INPATIENT


Condition:  STABLE


Referrals:  


NO PCP (PCP)








DEYA SMITH MD Mar 6, 2017 12:58

## 2017-03-06 NOTE — PDOC1
History and Physical


Date of Admission


Date of Admission


3/6/17





Identification/Chief Complaint


Chief Complaint


HD


Problems:  





Source


Source:  Caregiver





History of Present Illness


History of Present Illness


58yo F, was recently dced from hosp, new set up for ESRD HD [facility], comes for Hd, 

SINCE pt has medicaid pending, cannot set up an outpt HD for now.


i talked to her daughter, pt is Malawian speaking, pt feels good, no complains, 

has home meds.





Past Medical History


Cardiovascular:  HTN, Hyperlipidemia


Pulmonary:  No pertinent hx, Other


CENTRAL NERVOUS SYSTEM:  Other


GI:  GERD


Heme/Onc:  No pertinent hx


Hepatobiliary:  No pertinent hx


Psych:  No pertinent hx


Rheumatologic:  No pertinent hx


Infectious disease:  No pertinent hx


Renal/:  No pertinent hx


Endocrine:  Diabetes





Past Surgical History


Past Surgical History:  Cystoscopy, Other





Family History


Family History:  Heart Disease





Social History


ALCOHOL:  none


Drugs:  None





Current Problem List


Problem List


 Problems


Medical Problems:


(1) ESRD (end stage renal disease)


Status: Acute  











Current Medications


Current Medications





 Current Medications








 Medications


  (Trade)  Dose


 Ordered  Sig/Gume  Start Time


 Stop Time Status Last Admin


Dose Admin


 


 Clonidine HCl 0.1


 mg  0.1 mg  1X PRN  PRN  3/6/17 14:15


 3/7/17 14:14   


 


 


 Diphenhydramine


 HCl


  (Benadryl)  25 mg  1X PRN  PRN  3/6/17 14:15


 3/7/17 14:14   


 


 


 Info


  (PHARMACY


 MONITORING -- do


 not chart)  1 each  PRN DAILY  PRN  3/6/17 14:15


     


 


 


 Labetalol HCl


  (Normodyne)  10 mg  PRN Q1HR  PRN  3/6/17 14:15


 3/7/17 14:14   


 


 


 Ondansetron HCl


  (Zofran)  4 mg  PRN Q8HRS  PRN  3/6/17 13:15


 3/7/17 13:14   


 


 


 Sodium Chloride


  (Iv Sodium


 Chloride 0.9%


 1000ml Bag)  1,000 ml @ 


 400 mls/hr  Q2H30M PRN  3/6/17 14:12


 3/7/17 02:11   


 











Allergies


Allergies





 Allergies








Coded Allergies Type Severity Reaction Last Updated Verified


 


  No Known Drug Allergies    2/28/16 No











ROS


Review of System


CONSTITUTIONAL:        No fever or chills


EYES:                          No recent changes


SKIN:               No rash or itching


CARDIOVASCULAR:     No chest pain, syncope, palpitations, or edema


RESPIRATORY:            No SOB or cough


GASTROINTESTINAL:    No nausea, vomiting or abdominal pain


NEUROLOGICAL:          No headaches or weakness


ENDOCRINE:               No cold or heat intolerance


GENITOURINARY:         No urgency or frequency of urination


MUSCULOSKELETAL:   No back pain or joint pain


LYMPHATICS:               No enlarged lymph nodes


PSYCHIATRIC:              No anxiety or depression





Physical Exam


Physical Exam


GEN.:    No apparent distress.  Alert and oriented.


HEENT:    Head is normocephalic, atraumatic


NECK:    Supple.  


LUNGS:    Clear to auscultation.


HEART:    RRR, S1, S2 present.  Peripheral pulses intact


ABDOMEN:    Soft, nontender.  Positive bowel sounds.


EXTREMITIES:    Without any cyanosis.    


NEUROLOGIC:     Normal speech, normal tone


PSYCHIATRIC:    Normal affect, normal mood.


SKIN:   No ulcerations





Vitals


Vitals





 Vital Signs








  Date Time  Temp Pulse Resp B/P Pulse Ox O2 Delivery O2 Flow Rate FiO2


 


3/6/17 14:10      Room Air  


 


3/6/17 13:53  75 16 176/82 98   


 


3/6/17 11:05 98.4       





 98.4       











Labs


Labs





Laboratory Tests








Test


  3/6/17


11:22 3/6/17


14:40


 


Bedside Hemoglobin


  9.9g/dL


(12-15) 


 


 


Bedside Hematocrit 29% (36-40)  


 


Bedside Sodium


  136mmol/L


(135-145) 


 


 


Bedside Potassium


  4.2mmol/L


(3.5-5.0) 


 


 


Bedside Chloride


  100mmol/L


() 


 


 


Bedside Total CO2


  23mmol/L


(23-32) 


 


 


Anion Gap


  19mmol/L


(6-14) 13 (6-14) 


 


 


Bedside Blood Urea Nitrogen 42mg/dL (8-26)  


 


Bedside Creatinine


  4.3mg/dL


(0.5-1.4) 


 


 


Glucose Level


  313mg/dL


(70-99) 246mg/dL


(70-99)


 


Bedside Ionized Calcium (Zahraa)


  0.97mmol/L


(1.13-1.32) 


 


 


Sodium Level


  


  141mmol/L


(136-145)


 


Potassium Level


  


  4.2mmol/L


(3.5-5.1)


 


Chloride Level


  


  103mmol/L


()


 


Carbon Dioxide Level


  


  25mmol/L


(21-32)


 


Blood Urea Nitrogen  49mg/dL (7-20) 


 


Creatinine


  


  4.6mg/dL


(0.6-1.0)


 


Estimated GFR


(Cockcroft-Gault) 


  9.8 


 


 


Calcium Level


  


  7.9mg/dL


(8.5-10.1)








Laboratory Tests








Test


  3/6/17


11:22 3/6/17


14:40


 


Bedside Hemoglobin


  9.9g/dL


(12-15) 


 


 


Bedside Hematocrit 29% (36-40)  


 


Bedside Sodium


  136mmol/L


(135-145) 


 


 


Bedside Potassium


  4.2mmol/L


(3.5-5.0) 


 


 


Bedside Chloride


  100mmol/L


() 


 


 


Bedside Total CO2


  23mmol/L


(23-32) 


 


 


Anion Gap


  19mmol/L


(6-14) 13 (6-14) 


 


 


Bedside Blood Urea Nitrogen 42mg/dL (8-26)  


 


Bedside Creatinine


  4.3mg/dL


(0.5-1.4) 


 


 


Glucose Level


  313mg/dL


(70-99) 246mg/dL


(70-99)


 


Bedside Ionized Calcium (Zahraa)


  0.97mmol/L


(1.13-1.32) 


 


 


Sodium Level


  


  141mmol/L


(136-145)


 


Potassium Level


  


  4.2mmol/L


(3.5-5.1)


 


Chloride Level


  


  103mmol/L


()


 


Carbon Dioxide Level


  


  25mmol/L


(21-32)


 


Blood Urea Nitrogen  49mg/dL (7-20) 


 


Creatinine


  


  4.6mg/dL


(0.6-1.0)


 


Estimated GFR


(Cockcroft-Gault) 


  9.8 


 


 


Calcium Level


  


  7.9mg/dL


(8.5-10.1)











VTE Prophylaxis Ordered


VTE Prophylaxis Devices:  No


VTE Pharmacological Prophylaxi:  Yes





Assessment/Plan


Assessment/Plan





1. ESRD on HD [facility]


comes to hosp since no outpt HD set up


CAD,


CHF


H/O CABG


HTN


DM2


Psoriasis








ARTEMIO NUNES MD Mar 6, 2017 15:50

## 2017-03-07 NOTE — [facility]
Admission Forms Criteria


                         RENAL FAILURE, CHRONIC





Clinical Indications for Admission to Inpatient Care





                                                                     (Place 'X' 

for any and all applicable criteria):





Admission is indicated for ANY ONE of the following (1)(2)(3)(4)(5):


[X]I.   Inpatient admission required rather than observation care (Use Renal 

Failure, Chronic: Observation Care 


        Criteria as appropriate) because of ANY ONE of the following:


                [ ]a)   Volume overload or uremic symptoms (eg, clinically 

significant pulmonary edema, hypertension, 


                        pericarditis, acidosis) too severe for, or not 

responsive (eg, for over 24 hours) to emergency 


                        department or observation care dialysis or treatment 

regimen (11)


                [ ]b)  Hemodynamic instability that is severe or persistent


                [ ]c)  Respiratory distress that is severe or persistent (11)


                [ ]d)  Clinically significant electrolyte abnormality that 

requires inpatient care (eg,hyperkalemia with 


                        severe ECG findings)[B]


                [ ]e)  Supplement O2 or respiratory therapy for over 24hrs that 

is performable only in acute inpatient setting


                [ ]f)   Continuous IV infusion of anticoagulation, platelet 

inhibitor, vasoactive, or Antiarrhythmic medication (15),


                [ ]g)  Pulmonary artery catheter monitoring               


                [ ]h)  Temporary pacemaker placement


                [ ]i)   Emergent pericardiocentesis                      


                [X]j)   Other condition, treatment or monitoring requiring 

inpatient admission


[ ]II.   Unexplained syncope [A]


[ ]III.  Recurrent seizures 


[ ]IV. Severe infections not treatable in outpatient setting (eg, peritonitis)(9

)


[ ]V.  Cardiac arrhythmias of immediate concern


[ ]VI. Encephalopathy


[ ]VII.Bleeding abnormalities (eg, platelet dysfunction) with active (eg, 

gastrointestinal)  bleeding











Extended stay beyond goal length of stay may be needed for (3)(4)(35)(36):


[ ]a)   Continuing uremic complications


[ ]b)   Comorbidities or complications


   


     


                                                                          


The original [employer] content created by [employer] has been revised. 


The portions of the content which have been revised are identified through the 

use of italic text or in bold, and [facility] 


has neither reviewed nor approved the modified material. All other unmodified 

content is copyright  [employer].                              





Please see references footnoted in the original [facility] edition 

2016


Admission Criteria Met?:  Yes








QUINN MACEDO Mar 7, 2017 13:35

## 2017-03-09 NOTE — DS
DATE OF DISCHARGE:  02/27/2017



ADMISSION DIAGNOSES:  Non-ST elevated acute myocardial infarction and chronic

renal disease.



DISCHARGE DIAGNOSES:  Resolving acute myocardial infarction, chronic end-stage

renal disease.  The patient is now being set up for dialysis.



HOSPITAL COURSE:  The patient is a pleasant 59-year-old female who presented

with elevated troponins and chest pain.  We diagnosed her with a non-ST elevated

acute myocardial infarction.  She also has chronic renal failure, now is started

on dialysis.  Basically, we consulted cardiology.  We did some dialysis.  She

has been in the hospital for a while waiting for outpatient dialysis to be

arranged, but we have not been able to set up just yet.  The plan is to let her

go home and have her come to our Emergency Room every other day for outpatient

dialysis.



DISPOSITION:  Home.



ACTIVITY:  As tolerated.



DIET:  Renal.



MEDICATIONS:  Please see the MRAD.



TOTAL TIME ON DISCHARGE:  38 minutes.

 



______________________________

BRAXTON HADLEY DO



DR:  CJ/marcela  JOB#:  [id number] / [id number]

DD:  03/09/2017 09:38  DT:  03/09/2017 18:18

## 2019-02-12 NOTE — PDOC
PROGRESS NOTES


Subjective


Subjective


SEEN IN FOLLOW UP OF NEW ESRD





Objective


Objective





 Vital Signs








  Date Time  Temp Pulse Resp B/P Pulse Ox O2 Delivery O2 Flow Rate FiO2


 


2/11/17 11:00 98.6 69 19 115/53  Room Air  





 98.6       


 


2/11/17 03:22     97   


 


2/10/17 14:25       2.0 














 Intake and Output 


 


 2/11/17





 07:00


 


Intake Total 540 ml


 


Balance 540 ml


 


 


 


Intake Oral 540 ml


 


# Voids 4











Physical Exam


Abdomen:  Normal bowel sounds, Soft, No tenderness, No hepatosplenomegaly, No 

masses


Heart:  Regular rate, Normal S1, Normal S2, No murmurs, Gallops


Extremities:  No clubbing, No cyanosis, No edema, Normal pulses, No tenderness/

swelling


General:  Alert


Lungs:  Clear to auscultation, Normal air movement





Diagnosis


RENAL FAILURE:  ESRD


ANEMIA:  Chronic diseases (ESRD)





Assessment


Assessment


 Problems


Medical Problems:


(1) Congestive heart failure


Status: Acute  





(2) NSTEMI (non-ST elevated myocardial infarction)


Status: Acute  











Plan


Plan of Care


FOR DIALYSIS MONDAY. SHE IS Islam AND DECLINES BLOOD TRANSFUSION





Comment


Review of Relevant


I have reviewed the following items lore (where applicable) has been applied.


Labs





Laboratory Tests








Test


  2/9/17


17:59 2/10/17


06:00 2/10/17


11:58 2/10/17


16:57


 


Glucose (Fingerstick)


  170mg/dL


(70-99) 


  96mg/dL


(70-99) 186mg/dL


(70-99)


 


White Blood Count


  


  5.9x10^3/uL


(4.0-11.0) 


  


 


 


Red Blood Count


  


  2.29x10^6/uL


(3.50-5.40) 


  


 


 


Hemoglobin


  


  7.0g/dL


(12.0-15.5) 


  


 


 


Hematocrit


  


  21.4%


(36.0-47.0) 


  


 


 


Mean Corpuscular Volume  93fL ()   


 


Mean Corpuscular Hemoglobin  31pg (25-35)   


 


Mean Corpuscular Hemoglobin


Concent 


  33g/dL (31-37) 


  


  


 


 


Red Cell Distribution Width


  


  15.0%


(11.5-14.5) 


  


 


 


Platelet Count


  


  157x10^3/uL


(140-400) 


  


 


 


Neutrophils (%) (Auto)  62% (31-73)   


 


Lymphocytes (%) (Auto)  22% (24-48)   


 


Monocytes (%) (Auto)  12% (0-9)   


 


Eosinophils (%) (Auto)  3% (0-3)   


 


Basophils (%) (Auto)  1% (0-3)   


 


Neutrophils # (Auto)


  


  3.7x10^3uL


(1.8-7.7) 


  


 


 


Lymphocytes # (Auto)


  


  1.3x10^3/uL


(1.0-4.8) 


  


 


 


Monocytes # (Auto)


  


  0.7x10^3/uL


(0.0-1.1) 


  


 


 


Eosinophils # (Auto)


  


  0.2x10^3/uL


(0.0-0.7) 


  


 


 


Basophils # (Auto)


  


  0.0x10^3/uL


(0.0-0.2) 


  


 


 


Sodium Level


  


  140mmol/L


(136-145) 


  


 


 


Potassium Level


  


  4.0mmol/L


(3.5-5.1) 


  


 


 


Chloride Level


  


  104mmol/L


() 


  


 


 


Carbon Dioxide Level


  


  29mmol/L


(21-32) 


  


 


 


Anion Gap  7 (6-14)   


 


Blood Urea Nitrogen  19mg/dL (7-20)   


 


Creatinine


  


  3.1mg/dL


(0.6-1.0) 


  


 


 


Estimated GFR


(Cockcroft-Gault) 


  15.4 


  


  


 


 


Glucose Level


  


  147mg/dL


(70-99) 


  


 


 


Calcium Level


  


  7.8mg/dL


(8.5-10.1) 


  


 


 


Phosphorus Level


  


  3.5mg/dL


(2.6-4.7) 


  


 


 


Magnesium Level


  


  1.7mg/dL


(1.8-2.4) 


  


 


 


Albumin


  


  2.1g/dL


(3.4-5.0) 


  


 














Test


  2/10/17


21:14 2/11/17


04:25 2/11/17


07:46 2/11/17


11:05


 


Glucose (Fingerstick)


  178mg/dL


(70-99) 


  131mg/dL


(70-99) 261mg/dL


(70-99)


 


White Blood Count


  


  6.4x10^3/uL


(4.0-11.0) 


  


 


 


Red Blood Count


  


  2.22x10^6/uL


(3.50-5.40) 


  


 


 


Hemoglobin


  


  6.9g/dL


(12.0-15.5) 


  


 


 


Hematocrit


  


  20.4%


(36.0-47.0) 


  


 


 


Mean Corpuscular Volume  92fL ()   


 


Mean Corpuscular Hemoglobin  31pg (25-35)   


 


Mean Corpuscular Hemoglobin


Concent 


  34g/dL (31-37) 


  


  


 


 


Red Cell Distribution Width


  


  14.8%


(11.5-14.5) 


  


 


 


Platelet Count


  


  154x10^3/uL


(140-400) 


  


 


 


Neutrophils (%) (Auto)  64% (31-73)   


 


Lymphocytes (%) (Auto)  20% (24-48)   


 


Monocytes (%) (Auto)  11% (0-9)   


 


Eosinophils (%) (Auto)  4% (0-3)   


 


Basophils (%) (Auto)  1% (0-3)   


 


Neutrophils # (Auto)


  


  4.1x10^3uL


(1.8-7.7) 


  


 


 


Lymphocytes # (Auto)


  


  1.3x10^3/uL


(1.0-4.8) 


  


 


 


Monocytes # (Auto)


  


  0.7x10^3/uL


(0.0-1.1) 


  


 


 


Eosinophils # (Auto)


  


  0.3x10^3/uL


(0.0-0.7) 


  


 


 


Basophils # (Auto)


  


  0.0x10^3/uL


(0.0-0.2) 


  


 


 


Sodium Level


  


  138mmol/L


(136-145) 


  


 


 


Potassium Level


  


  4.2mmol/L


(3.5-5.1) 


  


 


 


Chloride Level


  


  104mmol/L


() 


  


 


 


Carbon Dioxide Level


  


  28mmol/L


(21-32) 


  


 


 


Anion Gap  6 (6-14)   


 


Blood Urea Nitrogen  16mg/dL (7-20)   


 


Creatinine


  


  2.7mg/dL


(0.6-1.0) 


  


 


 


Estimated GFR


(Cockcroft-Gault) 


  18.0 


  


  


 


 


Glucose Level


  


  160mg/dL


(70-99) 


  


 


 


Calcium Level


  


  7.8mg/dL


(8.5-10.1) 


  


 


 


Phosphorus Level


  


  3.6mg/dL


(2.6-4.7) 


  


 


 


Magnesium Level


  


  1.9mg/dL


(1.8-2.4) 


  


 


 


Albumin


  


  2.1g/dL


(3.4-5.0) 


  


 








Laboratory Tests








Test


  2/10/17


16:57 2/10/17


21:14 2/11/17


04:25 2/11/17


07:46


 


Glucose (Fingerstick)


  186mg/dL


(70-99) 178mg/dL


(70-99) 


  131mg/dL


(70-99)


 


White Blood Count


  


  


  6.4x10^3/uL


(4.0-11.0) 


 


 


Red Blood Count


  


  


  2.22x10^6/uL


(3.50-5.40) 


 


 


Hemoglobin


  


  


  6.9g/dL


(12.0-15.5) 


 


 


Hematocrit


  


  


  20.4%


(36.0-47.0) 


 


 


Mean Corpuscular Volume   92fL ()  


 


Mean Corpuscular Hemoglobin   31pg (25-35)  


 


Mean Corpuscular Hemoglobin


Concent 


  


  34g/dL (31-37) 


  


 


 


Red Cell Distribution Width


  


  


  14.8%


(11.5-14.5) 


 


 


Platelet Count


  


  


  154x10^3/uL


(140-400) 


 


 


Neutrophils (%) (Auto)   64% (31-73)  


 


Lymphocytes (%) (Auto)   20% (24-48)  


 


Monocytes (%) (Auto)   11% (0-9)  


 


Eosinophils (%) (Auto)   4% (0-3)  


 


Basophils (%) (Auto)   1% (0-3)  


 


Neutrophils # (Auto)


  


  


  4.1x10^3uL


(1.8-7.7) 


 


 


Lymphocytes # (Auto)


  


  


  1.3x10^3/uL


(1.0-4.8) 


 


 


Monocytes # (Auto)


  


  


  0.7x10^3/uL


(0.0-1.1) 


 


 


Eosinophils # (Auto)


  


  


  0.3x10^3/uL


(0.0-0.7) 


 


 


Basophils # (Auto)


  


  


  0.0x10^3/uL


(0.0-0.2) 


 


 


Sodium Level


  


  


  138mmol/L


(136-145) 


 


 


Potassium Level


  


  


  4.2mmol/L


(3.5-5.1) 


 


 


Chloride Level


  


  


  104mmol/L


() 


 


 


Carbon Dioxide Level


  


  


  28mmol/L


(21-32) 


 


 


Anion Gap   6 (6-14)  


 


Blood Urea Nitrogen   16mg/dL (7-20)  


 


Creatinine


  


  


  2.7mg/dL


(0.6-1.0) 


 


 


Estimated GFR


(Cockcroft-Gault) 


  


  18.0 


  


 


 


Glucose Level


  


  


  160mg/dL


(70-99) 


 


 


Calcium Level


  


  


  7.8mg/dL


(8.5-10.1) 


 


 


Phosphorus Level


  


  


  3.6mg/dL


(2.6-4.7) 


 


 


Magnesium Level


  


  


  1.9mg/dL


(1.8-2.4) 


 


 


Albumin


  


  


  2.1g/dL


(3.4-5.0) 


 














Test


  2/11/17


11:05 


  


  


 


 


Glucose (Fingerstick)


  261mg/dL


(70-99) 


  


  


 








Microbiology


2/10/17 Blood Culture - Preliminary, Resulted


          NO GROWTH AFTER 1 DAY


Medications





 Current Medications


Aspirin 324 mg 324 mg 1X  ONCE PO  Last administered on 2/7/17at 12:59;  Start 2 /7/17 at 12:45;  Stop 2/7/17 at 12:48;  Status DC


Heparin Sodium/ Dextrose 500 ml @  19 mls/hr CONT  PRN IV SEE I/O RECORD Last 

administered on 2/8/17at 14:45;  Start 2/7/17 at 12:45;  Stop 2/9/17 at 15:30;  

Status DC


Heparin Sodium (Porcine) 1,950 unit PRN Q6HRS  PRN IV FOR UFH LEVEL LESS THAN 

0.2 Last administered on 2/7/17at 13:01;  Start 2/7/17 at 12:45;  Stop 2/9/17 

at 15:30;  Status DC


Ondansetron HCl (Zofran) 4 mg PRN Q8HRS  PRN IV NAUSEA/VOMITING;  Start 2/7/17 

at 12:45;  Stop 2/8/17 at 12:44;  Status DC


Morphine Sulfate 4 mg PRN Q2HR  PRN IV PAIN Last administered on 2/7/17at 14:03

;  Start 2/7/17 at 12:45;  Stop 2/8/17 at 12:44;  Status DC


Nitroglycerin (Nitrostat) 0.4 mg PRN Q5MIN  PRN SL CHEST PAIN Last administered 

on 2/7/17at 14:03;  Start 2/7/17 at 12:45;  Stop 2/8/17 at 12:44;  Status DC


Amlodipine Besylate (Norvasc) 5 mg DAILYWLUN PO  Last administered on 2/8/17at 

08:32;  Start 2/7/17 at 14:00;  Stop 2/8/17 at 16:02;  Status DC


Aspirin (Ecotrin) 81 mg DAILYWBKFT PO  Last administered on 2/11/17at 09:26;  

Start 2/7/17 at 14:00


Atorvastatin Calcium (Lipitor) 80 mg QHS PO  Last administered on 2/10/17at 21:

10;  Start 2/7/17 at 21:00


Carvedilol (Coreg) 25 mg BIDWMEALS PO  Last administered on 2/11/17at 09:26;  

Start 2/7/17 at 17:00


Clopidogrel Bisulfate (Plavix) 75 mg DAILY PO  Last administered on 2/7/17at 14:

28;  Start 2/7/17 at 14:00;  Stop 2/8/17 at 08:41;  Status DC


Isosorbide Mononitrate (Imdur) 30 mg DAILY PO  Last administered on 2/11/17at 09

:25;  Start 2/7/17 at 14:00


Furosemide (Lasix) 40 mg 1X  ONCE IVP  Last administered on 2/7/17at 14:30;  

Start 2/7/17 at 14:00;  Stop 2/7/17 at 14:23;  Status DC


Furosemide 40 mg 40 mg 1X  ONCE IVP  Last administered on 2/7/17at 14:30;  

Start 2/7/17 at 14:30;  Stop 2/7/17 at 14:38;  Status DC


Nitroglycerin/ Dextrose (Nitroglycerin Drip) 250 ml @ 0 mls/hr CONT  PRN IV SEE 

I/O RECORD Last administered on 2/7/17at 15:23;  Start 2/7/17 at 14:30;  Stop 2/ 8/17 at 16:02;  Status DC


Acetaminophen (Tylenol) 325 mg PRN Q6HRS  PRN PO MILD PAIN / TEMP;  Start 2/7/ 17 at 15:00


Acetaminophen/ Hydrocodone Bitart (Lortab 5/325) 1 tab PRN Q6HRS  PRN PO 

MODERATE TO SEVERE PAIN;  Start 2/7/17 at 15:00


Hydralazine HCl (Apresoline) 10 mg PRN Q4HRS  PRN IVP ELEVATED BP, SEE COMMENTS

;  Start 2/7/17 at 15:00


Ondansetron HCl (Zofran) 4 mg PRN Q8HRS  PRN IV NAUSEA/VOMITING;  Start 2/7/17 

at 15:00


Albuterol Sulfate (Ventolin Neb Soln) 2.5 mg PRN Q4HRS  PRN NEB SHORTNESS OF 

BREATH;  Start 2/7/17 at 15:00


Insulin Aspart (Novolog) 0-7 UNITS TIDWMEALS SQ  Last administered on 2/11/17at 

13:00;  Start 2/7/17 at 17:00


Dextrose 12.5 gm PRN Q15MIN  PRN IV SEE COMMENTS;  Start 2/7/17 at 15:00


Info 1 each 1 each PRN DAILY  PRN MC SEE COMMENTS Last administered on 2/9/17at 

09:38;  Start 2/7/17 at 17:00;  Stop 2/10/17 at 08:25;  Status DC


Magnesium Sulfate/ Dextrose (Magnesium Sulfate PREMIX 2GM) 50 ml @ 25 mls/hr 

PRN DAILY  PRN IV for Mag < 1.7 on am labs;  Start 2/8/17 at 11:15


Lidocaine/Sodium Bicarbonate (Buffered Lidocaine 1%) 3 ml 1X  ONCE IJ  Last 

administered on 2/8/17at 14:03;  Start 2/8/17 at 12:30;  Stop 2/8/17 at 12:34;  

Status DC


Heparin Sodium (Porcine) 2,400 unit 1X  ONCE INT CAT  Last administered on 2/8/ 17at 14:04;  Start 2/8/17 at 12:30;  Stop 2/8/17 at 12:34;  Status DC


Heparin Sodium/ Sodium Chloride 60 unit 1X  ONCE IV  Last administered on 2/8/ 17at 14:04;  Start 2/8/17 at 12:30;  Stop 2/8/17 at 12:34;  Status DC


Lisinopril (Prinivil) 20 mg DAILY PO  Last administered on 2/11/17at 09:26;  

Start 2/8/17 at 16:00


Hydralazine HCl 50 mg 50 mg BID PO  Last administered on 2/11/17at 09:27;  

Start 2/8/17 at 21:00


Sodium Chloride (Iv Sodium Chloride 0.9% 1000ml Bag) 1,000 ml @  1,000 mls/hr 

Q1H PRN IV hypotension;  Start 2/8/17 at 18:28;  Stop 2/9/17 at 00:27;  Status 

DC


Diphenhydramine HCl (Benadryl) 25 mg 1X PRN  PRN IV ITCHING;  Start 2/8/17 at 18

:30;  Stop 2/9/17 at 18:29;  Status DC


Diphenhydramine HCl (Benadryl) 25 mg 1X PRN  PRN IV ITCHING;  Start 2/8/17 at 18

:30;  Stop 2/9/17 at 18:29;  Status DC


Sodium Chloride (Normal Saline Flush) 10 ml 1X PRN  PRN IV AP catheter pack;  

Start 2/8/17 at 18:30;  Stop 2/9/17 at 18:29;  Status DC


Sodium Chloride 10 ml 10 ml 1X PRN  PRN IV  catheter pack;  Start 2/8/17 at 18

:30;  Stop 2/9/17 at 18:29;  Status DC


Sodium Chloride (Iv Sodium Chloride 0.9% 1000ml Bag) 1,000 ml @  400 mls/hr 

Q2H30M PRN IV PATENCY;  Start 2/8/17 at 18:28;  Stop 2/9/17 at 06:27;  Status DC


Info (PHARMACY MONITORING -- do not chart) 1 each PRN DAILY  PRN MC SEE COMMENTS

;  Start 2/8/17 at 18:30


Info (PHARMACY MONITORING -- do not chart) 1 each PRN DAILY  PRN MC SEE COMMENTS

;  Start 2/9/17 at 10:30;  Status UNV


Info (PHARMACY MONITORING -- do not chart) 1 each PRN DAILY  PRN MC SEE COMMENTS

;  Start 2/9/17 at 10:30;  Status Cancel


Darbepoetin Valeriano (Aranesp) 60 mcg WEEKLYHS SQ  Last administered on 2/9/17at 20:

48;  Start 2/9/17 at 21:00


Clopidogrel Bisulfate (Plavix) 75 mg DAILYWBKFT PO ;  Start 2/10/17 at 08:00;  

Stop 2/10/17 at 08:00;  Status DC


Clopidogrel Bisulfate 75 mg 75 mg DAILYWBKFT PO  Last administered on 2/10/17at 

08:25;  Start 2/9/17 at 15:30


Sodium Chloride (Iv Sodium Chloride 0.9% 1000ml Bag) 1,000 ml @  1,000 mls/hr 

Q1H PRN IV hypotension;  Start 2/10/17 at 08:11;  Stop 2/10/17 at 15:00;  

Status DC


Diphenhydramine HCl (Benadryl) 25 mg 1X PRN  PRN IV ITCHING;  Start 2/10/17 at 

08:15;  Stop 2/10/17 at 15:00;  Status DC


Diphenhydramine HCl (Benadryl) 25 mg 1X PRN  PRN IV ITCHING;  Start 2/10/17 at 

08:15;  Stop 2/10/17 at 15:00;  Status DC


Sodium Chloride (Normal Saline Flush) 10 ml 1X PRN  PRN IV AP catheter pack;  

Start 2/10/17 at 08:15;  Stop 2/10/17 at 15:00;  Status DC


Sodium Chloride 10 ml 10 ml 1X PRN  PRN IV  catheter pack;  Start 2/10/17 at 

08:15;  Stop 2/10/17 at 15:00;  Status DC


Sodium Chloride (Iv Sodium Chloride 0.9% 1000ml Bag) 1,000 ml @  400 mls/hr 

Q2H30M PRN IV PATENCY;  Start 2/10/17 at 08:11;  Stop 2/10/17 at 21:00;  Status 

DC


Info 1 each 1 each PRN DAILY  PRN MC SEE COMMENTS;  Start 2/10/17 at 08:15;  

Status UNV


Magnesium Sulfate/ Dextrose (Magnesium Sulfate PREMIX 1GM) 100 ml @  100 mls/hr 

1X  ONCE IV  Last administered on 2/10/17at 12:36;  Start 2/10/17 at 10:30;  

Stop 2/10/17 at 11:29;  Status DC





Active Scripts


Active


Lisinopril 20 Mg Tablet 20 Mg PO QHS


Isosorbide Mononitrate Er (Isosorbide Mononitrate) 30 Mg Tab.er.24h 30 Mg PO 

DAILY


Hydralazine Hcl 50 Mg Tablet 100 Mg PO TID


Carvedilol 12.5 Mg Tablet 25 Mg PO BIDWMEALS


Atorvastatin Calcium 40 Mg Tablet 80 Mg PO QHS


Aspirin Ec (Aspirin) 81 Mg Tablet.dr 81 Mg PO DAILYWBKFT


Amlodipine Besylate 5 Mg Tablet 5 Mg PO DAILYWLUN


Reported


Clopidogrel (Clopidogrel Bisulfate) 75 Mg Tablet 75 Mg PO DAILY


Glyburide 5 Mg Tablet 1 Tab PO BIDWMEALS


Lisinopril 20 Mg Tablet 1 Tab PO DAILY


Vitals/I & O





 Vital Sign - Last 24 Hours








 2/10/17 2/10/17 2/10/17 2/10/17





 17:00 17:03 19:00 20:00


 


Temp  98.6 99.2 





  98.6 99.2 


 


Pulse 71 71 66 


 


Resp  18 21 


 


B/P 126/58 126/58  


 


Pulse Ox  98  


 


O2 Delivery  Room Air Room Air Room Air


 


    





    





 2/10/17 2/10/17 2/11/17 2/11/17





 21:10 23:00 00:15 00:15


 


Temp   98.4 





   98.4 


 


Pulse 69 73 72 


 


Resp  19 18 


 


B/P 141/69 129/62 129/63 


 


Pulse Ox  96 99 


 


O2 Delivery  Room Air Room Air Room Air


 


    





    





 2/11/17 2/11/17 2/11/17 2/11/17





 03:22 07:00 08:00 09:25


 


Temp 98.2 98.0  





 98.2 98.0  


 


Pulse 73 69  73


 


Resp 18 15  


 


B/P 142/65 142/66  142/65


 


Pulse Ox 97   


 


O2 Delivery Room Air Room Air Room Air 


 


    





    





 2/11/17 2/11/17 2/11/17 2/11/17





 09:26 09:26 09:27 11:00


 


Temp    98.6





    98.6


 


Pulse 73 73 73 69


 


Resp    19


 


B/P 142/65 142/65 142/65 115/53


 


O2 Delivery    Room Air














 Intake and Output   


 


 2/10/17 2/10/17 2/11/17





 15:00 23:00 07:00


 


Intake Total 200 ml 240 ml 100 ml


 


Balance 200 ml 240 ml 100 ml














SUMMER SALGADO MD Feb 11, 2017 14:34 Statement Selected

## 2022-10-30 NOTE — [facility]
Admission Forms Criteria


                         RENAL FAILURE, CHRONIC





Clinical Indications for Admission to Inpatient Care





                                                                     (Place 'X' 

for any and all applicable criteria):





Admission is indicated for ANY ONE of the following (1)(2)(3)(4)(5):


[X]I.   Inpatient admission required rather than observation care (Use Renal 

Failure, Chronic: Observation Care 


        Criteria as appropriate) because of ANY ONE of the following:


                [X]a)   Volume overload or uremic symptoms (eg, clinically 

significant pulmonary edema, hypertension, 


                        pericarditis, acidosis) too severe for, or not 

responsive (eg, for over 24 hours) to emergency 


                        department or observation care dialysis or treatment 

regimen (11)


                [ ]b)  Hemodynamic instability that is severe or persistent


                [ ]c)  Respiratory distress that is severe or persistent (11)


                [ ]d)  Clinically significant electrolyte abnormality that 

requires inpatient care (eg,hyperkalemia with 


                        severe ECG findings)[B]


                [ ]e)  Supplement O2 or respiratory therapy for over 24hrs that 

is performable only in acute inpatient setting


                [ ]f)   Continuous IV infusion of anticoagulation, platelet 

inhibitor, vasoactive, or Antiarrhythmic medication (15),


                [ ]g)  Pulmonary artery catheter monitoring               


                [ ]h)  Temporary pacemaker placement


                [ ]i)   Emergent pericardiocentesis                      


                [ ]j)   Other condition, treatment or monitoring requiring 

inpatient admission


[ ]II.   Unexplained syncope [A]


[ ]III.  Recurrent seizures 


[ ]IV. Severe infections not treatable in outpatient setting (eg, peritonitis)(9

)


[ ]V.  Cardiac arrhythmias of immediate concern


[ ]VI. Encephalopathy


[ ]VII.Bleeding abnormalities (eg, platelet dysfunction) with active (eg, 

gastrointestinal)  bleeding











Extended stay beyond goal length of stay may be needed for (3)(4)(35)(36):


[ ]a)   Continuing uremic complications


[ ]b)   Comorbidities or complications


   


     


                                                                          


The original [facility] content created by [employer] has been revised. 


The portions of the content which have been revised are identified through the 

use of italic text or in bold, and [email or web address] 


has neither reviewed nor approved the modified material. All other unmodified 

content is copyright  [employer]                              





Please see references footnoted in the original [facility] edition 

2016


Admission Criteria Met?:  Yes








THEE DOBBS Mar 3, 2017 10:43 General (Pediatric)